# Patient Record
Sex: FEMALE | Race: WHITE | NOT HISPANIC OR LATINO | Employment: FULL TIME | URBAN - METROPOLITAN AREA
[De-identification: names, ages, dates, MRNs, and addresses within clinical notes are randomized per-mention and may not be internally consistent; named-entity substitution may affect disease eponyms.]

---

## 2020-02-05 ENCOUNTER — OFFICE VISIT (OUTPATIENT)
Dept: OBSTETRICS AND GYNECOLOGY | Facility: CLINIC | Age: 45
End: 2020-02-05
Attending: STUDENT IN AN ORGANIZED HEALTH CARE EDUCATION/TRAINING PROGRAM
Payer: COMMERCIAL

## 2020-02-05 ENCOUNTER — LAB VISIT (OUTPATIENT)
Dept: LAB | Facility: HOSPITAL | Age: 45
End: 2020-02-05
Attending: STUDENT IN AN ORGANIZED HEALTH CARE EDUCATION/TRAINING PROGRAM
Payer: COMMERCIAL

## 2020-02-05 VITALS
BODY MASS INDEX: 24.54 KG/M2 | SYSTOLIC BLOOD PRESSURE: 110 MMHG | WEIGHT: 152.69 LBS | HEIGHT: 66 IN | DIASTOLIC BLOOD PRESSURE: 70 MMHG

## 2020-02-05 DIAGNOSIS — Z12.31 VISIT FOR SCREENING MAMMOGRAM: ICD-10-CM

## 2020-02-05 DIAGNOSIS — Z01.419 WELL WOMAN EXAM: ICD-10-CM

## 2020-02-05 DIAGNOSIS — Z11.51 SCREENING FOR HPV (HUMAN PAPILLOMAVIRUS): Primary | ICD-10-CM

## 2020-02-05 DIAGNOSIS — Z12.4 SCREENING FOR CERVICAL CANCER: ICD-10-CM

## 2020-02-05 DIAGNOSIS — N92.6 IRREGULAR PERIODS/MENSTRUAL CYCLES: ICD-10-CM

## 2020-02-05 LAB
BASOPHILS # BLD AUTO: 0.04 K/UL (ref 0–0.2)
BASOPHILS NFR BLD: 0.6 % (ref 0–1.9)
DIFFERENTIAL METHOD: NORMAL
EOSINOPHIL # BLD AUTO: 0.3 K/UL (ref 0–0.5)
EOSINOPHIL NFR BLD: 3.6 % (ref 0–8)
ERYTHROCYTE [DISTWIDTH] IN BLOOD BY AUTOMATED COUNT: 12 % (ref 11.5–14.5)
HCT VFR BLD AUTO: 43.1 % (ref 37–48.5)
HGB BLD-MCNC: 14.2 G/DL (ref 12–16)
IMM GRANULOCYTES # BLD AUTO: 0.02 K/UL (ref 0–0.04)
IMM GRANULOCYTES NFR BLD AUTO: 0.3 % (ref 0–0.5)
LYMPHOCYTES # BLD AUTO: 2.5 K/UL (ref 1–4.8)
LYMPHOCYTES NFR BLD: 34.7 % (ref 18–48)
MCH RBC QN AUTO: 30 PG (ref 27–31)
MCHC RBC AUTO-ENTMCNC: 32.9 G/DL (ref 32–36)
MCV RBC AUTO: 91 FL (ref 82–98)
MONOCYTES # BLD AUTO: 0.8 K/UL (ref 0.3–1)
MONOCYTES NFR BLD: 10.5 % (ref 4–15)
NEUTROPHILS # BLD AUTO: 3.6 K/UL (ref 1.8–7.7)
NEUTROPHILS NFR BLD: 50.3 % (ref 38–73)
NRBC BLD-RTO: 0 /100 WBC
PLATELET # BLD AUTO: 295 K/UL (ref 150–350)
PMV BLD AUTO: 11.4 FL (ref 9.2–12.9)
RBC # BLD AUTO: 4.74 M/UL (ref 4–5.4)
TSH SERPL DL<=0.005 MIU/L-ACNC: 0.92 UIU/ML (ref 0.4–4)
WBC # BLD AUTO: 7.23 K/UL (ref 3.9–12.7)

## 2020-02-05 PROCEDURE — 99999 PR PBB SHADOW E&M-NEW PATIENT-LVL III: CPT | Mod: PBBFAC,,, | Performed by: STUDENT IN AN ORGANIZED HEALTH CARE EDUCATION/TRAINING PROGRAM

## 2020-02-05 PROCEDURE — 85025 COMPLETE CBC W/AUTO DIFF WBC: CPT

## 2020-02-05 PROCEDURE — 99386 PREV VISIT NEW AGE 40-64: CPT | Mod: S$GLB,,, | Performed by: STUDENT IN AN ORGANIZED HEALTH CARE EDUCATION/TRAINING PROGRAM

## 2020-02-05 PROCEDURE — 99999 PR PBB SHADOW E&M-NEW PATIENT-LVL III: ICD-10-PCS | Mod: PBBFAC,,, | Performed by: STUDENT IN AN ORGANIZED HEALTH CARE EDUCATION/TRAINING PROGRAM

## 2020-02-05 PROCEDURE — 99386 PR PREVENTIVE VISIT,NEW,40-64: ICD-10-PCS | Mod: S$GLB,,, | Performed by: STUDENT IN AN ORGANIZED HEALTH CARE EDUCATION/TRAINING PROGRAM

## 2020-02-05 PROCEDURE — 84443 ASSAY THYROID STIM HORMONE: CPT

## 2020-02-05 PROCEDURE — 87624 HPV HI-RISK TYP POOLED RSLT: CPT

## 2020-02-05 PROCEDURE — 88175 CYTOPATH C/V AUTO FLUID REDO: CPT

## 2020-02-05 RX ORDER — VORTIOXETINE 20 MG/1
1 TABLET, FILM COATED ORAL DAILY
COMMUNITY
Start: 2019-12-24 | End: 2020-05-12

## 2020-02-05 NOTE — PROGRESS NOTES
Chief Complaint: Well Woman Exam     HPI:      Mary Kate Lima is a 44 y.o.  who presents today for well woman exam.  LMP: Patient's last menstrual period was 2020 (exact date).  Does report some heavy cycles that occur every 3 weeks.  Previously had an U/S and possible biopsy (she is unsure what - maybe a colposcopy?).  She also was on OCPs for 5-6 months but did not notice improvement.  No other issues, problems, or complaints. Specifically, patient denies abnormal vaginal bleeding, discharge, pelvic pain, urinary problems, or changes in appetite. Ms. Lima is currently sexually active with a single male partner. She is currently using bilateral tubal ligation for contraception. She declines STD screening today.    Previous Pap: No result found  Previous Mammogram: 2019 Normal per patient  Most Recent Dexa: NA  Colonoscopy: NA    GYN Hx:  Menarche 14.  Reports cycles occur every 21 days with menses lasting 4 days.  Denies history of abnormal pap smears or STIs.  Gardasil:  No.   OB History        3    Para   3    Term   3            AB        Living   3       SAB        TAB        Ectopic        Multiple        Live Births   3           Obstetric Comments   Menarche age 14           Past Medical History:   Diagnosis Date    Abnormal Pap smear of cervix      Past Surgical History:   Procedure Laterality Date     SECTION      x3    ELBOW SURGERY Right     HEMORRHOID SURGERY      TUBAL LIGATION       Social History     Socioeconomic History    Marital status:      Spouse name: Not on file    Number of children: Not on file    Years of education: Not on file    Highest education level: Not on file   Occupational History    Not on file   Social Needs    Financial resource strain: Not on file    Food insecurity:     Worry: Not on file     Inability: Not on file    Transportation needs:     Medical: Not on file     Non-medical: Not on file   Tobacco Use    Smoking  "status: Never Smoker    Smokeless tobacco: Never Used   Substance and Sexual Activity    Alcohol use: Yes     Comment: socially    Drug use: Never    Sexual activity: Yes     Partners: Male     Birth control/protection: See Surgical Hx   Lifestyle    Physical activity:     Days per week: Not on file     Minutes per session: Not on file    Stress: Not on file   Relationships    Social connections:     Talks on phone: Not on file     Gets together: Not on file     Attends Oriental orthodox service: Not on file     Active member of club or organization: Not on file     Attends meetings of clubs or organizations: Not on file     Relationship status: Not on file   Other Topics Concern    Not on file   Social History Narrative    Not on file     Family History   Problem Relation Age of Onset    Breast cancer Paternal Grandmother     Breast cancer Paternal Aunt     Colon cancer Neg Hx     Ovarian cancer Neg Hx        Current Outpatient Medications:     TRINTELLIX 20 mg Tab, Take 1 tablet by mouth once daily., Disp: , Rfl:     ROS:     GENERAL: Denies unintentional weight gain or weight loss. Feeling well overall.   SKIN: Denies rash or lesions.   HEENT: Denies headaches, or vision changes.   CARDIOVASCULAR: Denies palpitations or chest pain.   RESPIRATORY: Denies shortness of breath or dyspnea on exertion.  BREASTS: Denies pain, lumps, or nipple discharge.   ABDOMEN: Denies abdominal pain, constipation, diarrhea, nausea, vomiting, change in appetite.  URINARY: Denies frequency, dysuria, hematuria.  NEUROLOGIC: Denies syncope or weakness.   PSYCHIATRIC: Denies depression, anxiety or mood swings.    Physical Exam:      PHYSICAL EXAM:  /70   Ht 5' 6" (1.676 m)   Wt 69.3 kg (152 lb 10.7 oz)   LMP 01/24/2020 (Exact Date) Comment: Tubal ligation  BMI 24.64 kg/m²   Body mass index is 24.64 kg/m².     APPEARANCE: Well nourished, well developed, in no acute distress.  PSYCH: Appropriate mood and affect.  SKIN: No " acne or hirsutism.  NECK: Neck symmetric without masses or thyromegaly.  NODES: No inguinal, axillary, or supraclavicular lymph node enlargement.  CHEST: Normal respiratory effort.    CARDIOVASCULAR:  Regular rate and rhythm.  LUNGS:  Clear to auscultation bilaterally.  BREASTS: Symmetrical, no skin changes or visible lesions.  No palpable masses or nipple discharge bilaterally.  ABDOMEN: Soft.  No tenderness or masses.    PELVIC: Normal external genitalia without lesions.  Normal hair distribution.  Adequate perineal body, normal urethral meatus.  Vagina moist and well rugated without lesions or discharge.  Cervix pink, without lesions, discharge or tenderness.  No significant cystocele or rectocele.  Bimanual exam shows uterus to be normal size, regular, mobile and nontender.  Adnexa without masses or tenderness.    EXTREMITIES: No edema.  No tenderness to palpation.    Labs:  upt neg    Assessment/Plan:     44 y.o.     Screening for HPV (human papillomavirus)  -     HPV High Risk Genotypes, PCR    Screening for cervical cancer  -     Liquid-Based Pap Smear, Screening    Visit for screening mammogram  -     Mammo Digital Screening Bilat w/ Marcellus; Future; Expected date: 2020    Irregular periods/menstrual cycles  -     POCT urine pregnancy  -     CBC auto differential; Future; Expected date: 2020  -     TSH; Future; Expected date: 2020  -     US Pelvis Comp with Transvag NON-OB (xpd; Future; Expected date: 2020    Well woman exam          Counselin.  Annual exam performed today without difficulty.  Patient was counseled today on current ASCCP pap guidelines, the recommendation for yearly pelvic exams, healthy diet and exercise routines, annual mammograms.  Pap smear ordered.  All questions answered.    2.  Contraception:  BTL.  3.  STD testing:  Declines.  4.  Follow up with PCP for other health maintenance.  5.  RTC in 2 weeks for AUB evaluation or sooner if needed.    Use of  the [employer] Patient Portal discussed and encouraged during today's visit.

## 2020-02-06 ENCOUNTER — PATIENT MESSAGE (OUTPATIENT)
Dept: OBSTETRICS AND GYNECOLOGY | Facility: CLINIC | Age: 45
End: 2020-02-06

## 2020-02-06 NOTE — TELEPHONE ENCOUNTER
Per patient.      So, If I'm reading them right, my test results are all normal? Including thyroid? Thanks

## 2020-02-12 LAB
HPV HR 12 DNA SPEC QL NAA+PROBE: NEGATIVE
HPV16 AG SPEC QL: NEGATIVE
HPV18 DNA SPEC QL NAA+PROBE: NEGATIVE

## 2020-03-02 ENCOUNTER — OFFICE VISIT (OUTPATIENT)
Dept: OBSTETRICS AND GYNECOLOGY | Facility: CLINIC | Age: 45
End: 2020-03-02
Attending: STUDENT IN AN ORGANIZED HEALTH CARE EDUCATION/TRAINING PROGRAM
Payer: COMMERCIAL

## 2020-03-02 ENCOUNTER — TELEPHONE (OUTPATIENT)
Dept: OBSTETRICS AND GYNECOLOGY | Facility: CLINIC | Age: 45
End: 2020-03-02

## 2020-03-02 ENCOUNTER — APPOINTMENT (OUTPATIENT)
Dept: RADIOLOGY | Facility: OTHER | Age: 45
End: 2020-03-02
Attending: STUDENT IN AN ORGANIZED HEALTH CARE EDUCATION/TRAINING PROGRAM
Payer: COMMERCIAL

## 2020-03-02 ENCOUNTER — PATIENT MESSAGE (OUTPATIENT)
Dept: OBSTETRICS AND GYNECOLOGY | Facility: CLINIC | Age: 45
End: 2020-03-02

## 2020-03-02 VITALS
SYSTOLIC BLOOD PRESSURE: 112 MMHG | HEIGHT: 66 IN | WEIGHT: 160.25 LBS | WEIGHT: 152.75 LBS | BODY MASS INDEX: 25.75 KG/M2 | DIASTOLIC BLOOD PRESSURE: 82 MMHG | BODY MASS INDEX: 24.55 KG/M2 | HEIGHT: 66 IN

## 2020-03-02 DIAGNOSIS — Z12.31 VISIT FOR SCREENING MAMMOGRAM: ICD-10-CM

## 2020-03-02 DIAGNOSIS — N93.9 ABNORMAL UTERINE BLEEDING (AUB): Primary | ICD-10-CM

## 2020-03-02 DIAGNOSIS — R92.8 ABNORMAL MAMMOGRAM: Primary | ICD-10-CM

## 2020-03-02 LAB
FINAL PATHOLOGIC DIAGNOSIS: NORMAL
Lab: NORMAL

## 2020-03-02 PROCEDURE — 77067 SCR MAMMO BI INCL CAD: CPT | Mod: 26,,, | Performed by: RADIOLOGY

## 2020-03-02 PROCEDURE — 77067 MAMMO DIGITAL SCREENING BILAT WITH TOMOSYNTHESIS_CAD: ICD-10-PCS | Mod: 26,,, | Performed by: RADIOLOGY

## 2020-03-02 PROCEDURE — 99213 OFFICE O/P EST LOW 20 MIN: CPT | Mod: S$GLB,,, | Performed by: STUDENT IN AN ORGANIZED HEALTH CARE EDUCATION/TRAINING PROGRAM

## 2020-03-02 PROCEDURE — 3008F BODY MASS INDEX DOCD: CPT | Mod: CPTII,S$GLB,, | Performed by: STUDENT IN AN ORGANIZED HEALTH CARE EDUCATION/TRAINING PROGRAM

## 2020-03-02 PROCEDURE — 99999 PR PBB SHADOW E&M-EST. PATIENT-LVL III: CPT | Mod: PBBFAC,,, | Performed by: STUDENT IN AN ORGANIZED HEALTH CARE EDUCATION/TRAINING PROGRAM

## 2020-03-02 PROCEDURE — 3008F PR BODY MASS INDEX (BMI) DOCUMENTED: ICD-10-PCS | Mod: CPTII,S$GLB,, | Performed by: STUDENT IN AN ORGANIZED HEALTH CARE EDUCATION/TRAINING PROGRAM

## 2020-03-02 PROCEDURE — 99999 PR PBB SHADOW E&M-EST. PATIENT-LVL III: ICD-10-PCS | Mod: PBBFAC,,, | Performed by: STUDENT IN AN ORGANIZED HEALTH CARE EDUCATION/TRAINING PROGRAM

## 2020-03-02 PROCEDURE — 77063 BREAST TOMOSYNTHESIS BI: CPT | Mod: 26,,, | Performed by: RADIOLOGY

## 2020-03-02 PROCEDURE — 77063 MAMMO DIGITAL SCREENING BILAT WITH TOMOSYNTHESIS_CAD: ICD-10-PCS | Mod: 26,,, | Performed by: RADIOLOGY

## 2020-03-02 PROCEDURE — 99213 PR OFFICE/OUTPT VISIT, EST, LEVL III, 20-29 MIN: ICD-10-PCS | Mod: S$GLB,,, | Performed by: STUDENT IN AN ORGANIZED HEALTH CARE EDUCATION/TRAINING PROGRAM

## 2020-03-02 PROCEDURE — 77067 SCR MAMMO BI INCL CAD: CPT | Mod: TC,PN

## 2020-03-02 NOTE — PROGRESS NOTES
Chief Complaint: U/S Results     HPI:      Mary Kate Lima is a 44 y.o.  who presents today for follow up of U/S results.  Has irregular and heavy cycles.  LMP: Patient's last menstrual period was 2020 (approximate).  No other issues, problems, or complaints.     OB History        3    Para   3    Term   3            AB        Living   3       SAB        TAB        Ectopic        Multiple        Live Births   3           Obstetric Comments   Menarche age 14           Past Medical History:   Diagnosis Date    Abnormal Pap smear of cervix      Past Surgical History:   Procedure Laterality Date     SECTION      x3    ELBOW SURGERY Right     HEMORRHOID SURGERY      TUBAL LIGATION       Social History     Socioeconomic History    Marital status:      Spouse name: Not on file    Number of children: Not on file    Years of education: Not on file    Highest education level: Not on file   Occupational History    Not on file   Social Needs    Financial resource strain: Not on file    Food insecurity:     Worry: Not on file     Inability: Not on file    Transportation needs:     Medical: Not on file     Non-medical: Not on file   Tobacco Use    Smoking status: Never Smoker    Smokeless tobacco: Never Used   Substance and Sexual Activity    Alcohol use: Yes     Comment: socially    Drug use: Never    Sexual activity: Yes     Partners: Male     Birth control/protection: See Surgical Hx   Lifestyle    Physical activity:     Days per week: Not on file     Minutes per session: Not on file    Stress: Not on file   Relationships    Social connections:     Talks on phone: Not on file     Gets together: Not on file     Attends Church service: Not on file     Active member of club or organization: Not on file     Attends meetings of clubs or organizations: Not on file     Relationship status: Not on file   Other Topics Concern    Not on file   Social History Narrative  "   Not on file     Family History   Problem Relation Age of Onset    Breast cancer Paternal Grandmother     Breast cancer Paternal Aunt     Colon cancer Neg Hx     Ovarian cancer Neg Hx        Current Outpatient Medications:     TRINTELLIX 20 mg Tab, Take 1 tablet by mouth once daily., Disp: , Rfl:     ROS:     GENERAL: Denies unintentional weight gain or weight loss. Feeling well overall.   SKIN: Denies rash or lesions.   HEENT: Denies headaches, or vision changes.   ABDOMEN: Denies abdominal pain, constipation, diarrhea, nausea, vomiting, change in appetite.  URINARY: Denies frequency, dysuria, hematuria.  NEUROLOGIC: Denies syncope or weakness.   PSYCHIATRIC: Denies depression, anxiety or mood swings.    Physical Exam:      PHYSICAL EXAM:  /82   Ht 5' 6" (1.676 m)   Wt 72.7 kg (160 lb 4.4 oz)   LMP 2020 (Approximate)   BMI 25.87 kg/m²   Body mass index is 25.87 kg/m².     APPEARANCE: Well nourished, well developed, in no acute distress.  PSYCH: Appropriate mood and affect.  SKIN: No acne or hirsutism.      Assessment/Plan:     44 y.o.     Abnormal uterine bleeding (AUB)          Counselin.  AUB:  U/S results reviewed with patient.  All questions answered.  Discussed EMB which patient would like to come in for in a few weeks.  Also reviewed treatment options.  She will think about her options.  2.  Follow up with PCP for other health maintenance.  3.  RTC in 2 weeks for EMB or sooner if needed.    Use of the [employer] Patient Portal discussed and encouraged during today's visit.             " no

## 2020-03-02 NOTE — TELEPHONE ENCOUNTER
Spoke with patient.  Pap smear normal.  Mammogram needs additional views.  Will set up.  All questions answered.

## 2020-03-03 NOTE — TELEPHONE ENCOUNTER
Called patient and gave number to the [facility] so that she can schedule her appointment, Patient will call to schedule.

## 2020-03-04 ENCOUNTER — TELEPHONE (OUTPATIENT)
Dept: RADIOLOGY | Facility: OTHER | Age: 45
End: 2020-03-04

## 2020-03-04 NOTE — TELEPHONE ENCOUNTER
Patient notified, per mammo recommendation, prior breast imaging has been uploaded into her chart and will be compared by Radiologist. She will be notified if it necessary to return for diagnostic mammogram.

## 2020-03-05 ENCOUNTER — TELEPHONE (OUTPATIENT)
Dept: RADIOLOGY | Facility: OTHER | Age: 45
End: 2020-03-05

## 2020-03-05 NOTE — TELEPHONE ENCOUNTER
Notified patient her prior mammo images have been compared and the calcification findings are stable and benign. Instructed on routine screening mammogram in one year. Diagnostic mammo appointment cancelled.

## 2020-05-12 RX ORDER — VORTIOXETINE 20 MG/1
TABLET, FILM COATED ORAL
Qty: 30 TABLET | Refills: 11 | Status: SHIPPED | OUTPATIENT
Start: 2020-05-12 | End: 2020-07-20 | Stop reason: SDUPTHER

## 2020-07-20 ENCOUNTER — OFFICE VISIT (OUTPATIENT)
Dept: FAMILY MEDICINE | Facility: CLINIC | Age: 45
End: 2020-07-20
Payer: COMMERCIAL

## 2020-07-20 VITALS
OXYGEN SATURATION: 98 % | DIASTOLIC BLOOD PRESSURE: 82 MMHG | SYSTOLIC BLOOD PRESSURE: 122 MMHG | WEIGHT: 162.94 LBS | BODY MASS INDEX: 26.19 KG/M2 | HEART RATE: 69 BPM | RESPIRATION RATE: 16 BRPM | HEIGHT: 66 IN | TEMPERATURE: 98 F

## 2020-07-20 DIAGNOSIS — F33.0 DEPRESSION, MAJOR, RECURRENT, MILD: ICD-10-CM

## 2020-07-20 DIAGNOSIS — Z00.00 ANNUAL PHYSICAL EXAM: Primary | ICD-10-CM

## 2020-07-20 PROCEDURE — 99999 PR PBB SHADOW E&M-EST. PATIENT-LVL III: CPT | Mod: PBBFAC,,, | Performed by: INTERNAL MEDICINE

## 2020-07-20 PROCEDURE — 99396 PR PREVENTIVE VISIT,EST,40-64: ICD-10-PCS | Mod: S$GLB,,, | Performed by: INTERNAL MEDICINE

## 2020-07-20 PROCEDURE — 99999 PR PBB SHADOW E&M-EST. PATIENT-LVL III: ICD-10-PCS | Mod: PBBFAC,,, | Performed by: INTERNAL MEDICINE

## 2020-07-20 PROCEDURE — 3008F BODY MASS INDEX DOCD: CPT | Mod: CPTII,S$GLB,, | Performed by: INTERNAL MEDICINE

## 2020-07-20 PROCEDURE — 99396 PREV VISIT EST AGE 40-64: CPT | Mod: S$GLB,,, | Performed by: INTERNAL MEDICINE

## 2020-07-20 PROCEDURE — 3008F PR BODY MASS INDEX (BMI) DOCUMENTED: ICD-10-PCS | Mod: CPTII,S$GLB,, | Performed by: INTERNAL MEDICINE

## 2020-07-20 RX ORDER — VORTIOXETINE 20 MG/1
1 TABLET, FILM COATED ORAL DAILY
Qty: 30 TABLET | Refills: 11 | Status: SHIPPED | OUTPATIENT
Start: 2020-07-20 | End: 2021-08-16

## 2020-07-20 NOTE — PROGRESS NOTES
[facility] Note    Chief Complaint      Chief Complaint   Patient presents with    Establish Care     from [facility]    Medication Refill       History of Present Illness      Mary Kate Lima is a 44 y.o. female who presents today for   Chief Complaint   Patient presents with    Establish Care     from [facility]    Medication Refill   .  Patient comes to appointment here for annual establish visit . Here for annual preventative visit . She needs full screening labs . She is a regular exerciser and is eating healthy diet .     Problem List Items Addressed This Visit        Other    Annual physical exam - Primary    Overview     pe documented needs full screening labs   Cont current regimen   Will make recs when all reviewed            Other Visit Diagnoses     Depression, major, recurrent, mild                Past Medical History:  Past Medical History:   Diagnosis Date    Abnormal Pap smear of cervix        Past Surgical History:  Past Surgical History:   Procedure Laterality Date     SECTION      x3    ELBOW SURGERY Right     HEMORRHOID SURGERY      TUBAL LIGATION         Family History:  family history includes Breast cancer in her paternal aunt and paternal grandmother.    Social History:  Social History     Socioeconomic History    Marital status:      Spouse name: Not on file    Number of children: Not on file    Years of education: Not on file    Highest education level: Not on file   Occupational History    Not on file   Social Needs    Financial resource strain: Not on file    Food insecurity     Worry: Not on file     Inability: Not on file    Transportation needs     Medical: Not on file     Non-medical: Not on file   Tobacco Use    Smoking status: Never Smoker    Smokeless tobacco: Never Used   Substance and Sexual Activity    Alcohol use: Yes     Comment: socially    Drug use: Never    Sexual activity: Yes     Partners: Male      Birth control/protection: See Surgical Hx   Lifestyle    Physical activity     Days per week: Not on file     Minutes per session: Not on file    Stress: Not on file   Relationships    Social connections     Talks on phone: Not on file     Gets together: Not on file     Attends Sikh service: Not on file     Active member of club or organization: Not on file     Attends meetings of clubs or organizations: Not on file     Relationship status: Not on file   Other Topics Concern    Not on file   Social History Narrative    Not on file       Review of Systems:   Review of Systems   Constitutional: Negative for fever and weight loss.   HENT: Negative for congestion, hearing loss and sore throat.    Eyes: Negative for blurred vision.   Respiratory: Negative for cough and shortness of breath.    Cardiovascular: Negative for chest pain, palpitations, claudication and leg swelling.   Gastrointestinal: Negative for abdominal pain, constipation, diarrhea and heartburn.   Genitourinary: Negative for dysuria.   Musculoskeletal: Negative for back pain and myalgias.   Skin: Negative for rash.   Neurological: Negative for focal weakness and headaches.   Psychiatric/Behavioral: Negative for depression, memory loss and suicidal ideas. The patient is not nervous/anxious.          Medications:  Outpatient Encounter Medications as of 7/20/2020   Medication Sig Dispense Refill    vortioxetine (TRINTELLIX) 20 mg Tab Take 1 tablet (20 mg total) by mouth once daily. 30 tablet 11    [DISCONTINUED] TRINTELLIX 20 mg Tab TAKE ONE TABLET BY MOUTH EVERY DAY 30 tablet 11     No facility-administered encounter medications on file as of 7/20/2020.         Allergies:  Review of patient's allergies indicates:  No Known Allergies      Physical Exam      Vitals:    07/20/20 1321   BP: 122/82   Pulse: 69   Resp: 16   Temp: 97.7 °F (36.5 °C)      Body mass index is 26.3 kg/m².    Physical Exam  Constitutional:       Appearance: She is  well-developed.   Eyes:      Pupils: Pupils are equal, round, and reactive to light.   Neck:      Musculoskeletal: Normal range of motion.      Thyroid: No thyromegaly.   Cardiovascular:      Rate and Rhythm: Normal rate.      Heart sounds: Normal heart sounds. No murmur. No friction rub. No gallop.    Pulmonary:      Breath sounds: Normal breath sounds.   Abdominal:      General: Bowel sounds are normal.      Palpations: Abdomen is soft. There is no hepatomegaly or splenomegaly.      Tenderness: There is no abdominal tenderness.   Musculoskeletal: Normal range of motion.   Lymphadenopathy:      Cervical: No cervical adenopathy.   Skin:     General: Skin is warm.      Findings: No rash.   Neurological:      Mental Status: She is alert and oriented to person, place, and time.      Cranial Nerves: No cranial nerve deficit.   Psychiatric:         Behavior: Behavior normal.          Laboratory:  CBC:  No results for input(s): WBC, RBC, HGB, HCT, PLT, MCV, MCH, MCHC in the last 2160 hours.  CMP:  No results for input(s): GLU, CALCIUM, ALBUMIN, PROT, NA, K, CO2, CL, BUN, ALKPHOS, ALT, AST, BILITOT in the last 2160 hours.    Invalid input(s): CREATININ  URINALYSIS:  No results for input(s): COLORU, CLARITYU, SPECGRAV, PHUR, PROTEINUA, GLUCOSEU, BILIRUBINCON, BLOODU, WBCU, RBCU, BACTERIA, MUCUS, NITRITE, LEUKOCYTESUR, UROBILINOGEN, HYALINECASTS in the last 2160 hours.   LIPIDS:  No results for input(s): TSH, HDL, CHOL, TRIG, LDLCALC, CHOLHDL, NONHDLCHOL, TOTALCHOLEST in the last 2160 hours.  TSH:  No results for input(s): TSH in the last 2160 hours.  A1C:  No results for input(s): HGBA1C in the last 2160 hours.    Radiology:        Assessment:     Mary Kate Lima is a 44 y.o.female with:    Annual physical exam  -     CBC auto differential; Future; Expected date: 07/20/2020  -     Comprehensive metabolic panel; Future; Expected date: 07/20/2020  -     Lipid Panel; Future; Expected date: 07/20/2020  -     TSH; Future;  Expected date: 07/20/2020    Depression, major, recurrent, mild  -     vortioxetine (TRINTELLIX) 20 mg Tab; Take 1 tablet (20 mg total) by mouth once daily.  Dispense: 30 tablet; Refill: 11          Plan:     Problem List Items Addressed This Visit        Other    Annual physical exam - Primary    Overview     pe documented needs full screening labs   Cont current regimen   Will make recs when all reviewed            Other Visit Diagnoses     Depression, major, recurrent, mild              As above, continue current medications and maintain follow up with specialists.  Return to clinic in 12  months.      Frederick W Dantagnan [facility] [place]

## 2020-10-19 PROBLEM — Z00.00 ANNUAL PHYSICAL EXAM: Status: RESOLVED | Noted: 2020-07-20 | Resolved: 2020-10-19

## 2020-11-10 ENCOUNTER — PATIENT MESSAGE (OUTPATIENT)
Dept: FAMILY MEDICINE | Facility: CLINIC | Age: 45
End: 2020-11-10

## 2020-11-10 DIAGNOSIS — F41.9 ANXIETY: ICD-10-CM

## 2020-11-10 DIAGNOSIS — F41.9 ANXIETY: Primary | ICD-10-CM

## 2020-11-10 RX ORDER — BUSPIRONE HYDROCHLORIDE 5 MG/1
5 TABLET ORAL 2 TIMES DAILY
Qty: 60 TABLET | Refills: 5 | Status: SHIPPED | OUTPATIENT
Start: 2020-11-10 | End: 2020-11-11 | Stop reason: SDUPTHER

## 2020-11-11 ENCOUNTER — PATIENT MESSAGE (OUTPATIENT)
Dept: FAMILY MEDICINE | Facility: CLINIC | Age: 45
End: 2020-11-11

## 2020-11-11 RX ORDER — BUSPIRONE HYDROCHLORIDE 5 MG/1
5 TABLET ORAL 2 TIMES DAILY
Qty: 60 TABLET | Refills: 5 | Status: SHIPPED | OUTPATIENT
Start: 2020-11-11 | End: 2022-07-20 | Stop reason: SDUPTHER

## 2021-03-18 ENCOUNTER — PATIENT MESSAGE (OUTPATIENT)
Dept: OBSTETRICS AND GYNECOLOGY | Facility: CLINIC | Age: 46
End: 2021-03-18

## 2021-04-05 ENCOUNTER — PATIENT MESSAGE (OUTPATIENT)
Dept: ADMINISTRATIVE | Facility: HOSPITAL | Age: 46
End: 2021-04-05

## 2021-04-13 ENCOUNTER — PATIENT MESSAGE (OUTPATIENT)
Dept: RESEARCH | Facility: HOSPITAL | Age: 46
End: 2021-04-13

## 2021-04-15 ENCOUNTER — PATIENT MESSAGE (OUTPATIENT)
Dept: FAMILY MEDICINE | Facility: CLINIC | Age: 46
End: 2021-04-15

## 2021-04-16 RX ORDER — ALPRAZOLAM 0.5 MG/1
TABLET ORAL
Qty: 5 TABLET | Refills: 0 | Status: SHIPPED | OUTPATIENT
Start: 2021-04-16 | End: 2021-04-19

## 2021-04-19 ENCOUNTER — APPOINTMENT (OUTPATIENT)
Dept: RADIOLOGY | Facility: OTHER | Age: 46
End: 2021-04-19
Attending: STUDENT IN AN ORGANIZED HEALTH CARE EDUCATION/TRAINING PROGRAM
Payer: COMMERCIAL

## 2021-04-19 ENCOUNTER — OFFICE VISIT (OUTPATIENT)
Dept: OBSTETRICS AND GYNECOLOGY | Facility: CLINIC | Age: 46
End: 2021-04-19
Attending: STUDENT IN AN ORGANIZED HEALTH CARE EDUCATION/TRAINING PROGRAM
Payer: COMMERCIAL

## 2021-04-19 VITALS
HEIGHT: 66 IN | WEIGHT: 157.31 LBS | SYSTOLIC BLOOD PRESSURE: 122 MMHG | DIASTOLIC BLOOD PRESSURE: 82 MMHG | BODY MASS INDEX: 25.28 KG/M2

## 2021-04-19 DIAGNOSIS — Z01.419 WELL WOMAN EXAM: ICD-10-CM

## 2021-04-19 DIAGNOSIS — Z12.31 VISIT FOR SCREENING MAMMOGRAM: Primary | ICD-10-CM

## 2021-04-19 DIAGNOSIS — Z12.31 VISIT FOR SCREENING MAMMOGRAM: ICD-10-CM

## 2021-04-19 DIAGNOSIS — N92.0 MENORRHAGIA WITH REGULAR CYCLE: ICD-10-CM

## 2021-04-19 DIAGNOSIS — Z11.51 SCREENING FOR HPV (HUMAN PAPILLOMAVIRUS): ICD-10-CM

## 2021-04-19 DIAGNOSIS — Z12.4 SCREENING FOR CERVICAL CANCER: ICD-10-CM

## 2021-04-19 PROCEDURE — 99396 PREV VISIT EST AGE 40-64: CPT | Mod: S$GLB,,, | Performed by: STUDENT IN AN ORGANIZED HEALTH CARE EDUCATION/TRAINING PROGRAM

## 2021-04-19 PROCEDURE — 1126F PR PAIN SEVERITY QUANTIFIED, NO PAIN PRESENT: ICD-10-PCS | Mod: S$GLB,,, | Performed by: STUDENT IN AN ORGANIZED HEALTH CARE EDUCATION/TRAINING PROGRAM

## 2021-04-19 PROCEDURE — 99999 PR PBB SHADOW E&M-EST. PATIENT-LVL III: CPT | Mod: PBBFAC,,, | Performed by: STUDENT IN AN ORGANIZED HEALTH CARE EDUCATION/TRAINING PROGRAM

## 2021-04-19 PROCEDURE — 77067 SCR MAMMO BI INCL CAD: CPT | Mod: TC,PN

## 2021-04-19 PROCEDURE — 77063 MAMMO DIGITAL SCREENING BILAT WITH TOMO: ICD-10-PCS | Mod: 26,,, | Performed by: RADIOLOGY

## 2021-04-19 PROCEDURE — 77063 BREAST TOMOSYNTHESIS BI: CPT | Mod: 26,,, | Performed by: RADIOLOGY

## 2021-04-19 PROCEDURE — 77067 SCR MAMMO BI INCL CAD: CPT | Mod: 26,,, | Performed by: RADIOLOGY

## 2021-04-19 PROCEDURE — 99396 PR PREVENTIVE VISIT,EST,40-64: ICD-10-PCS | Mod: S$GLB,,, | Performed by: STUDENT IN AN ORGANIZED HEALTH CARE EDUCATION/TRAINING PROGRAM

## 2021-04-19 PROCEDURE — 87624 HPV HI-RISK TYP POOLED RSLT: CPT | Performed by: STUDENT IN AN ORGANIZED HEALTH CARE EDUCATION/TRAINING PROGRAM

## 2021-04-19 PROCEDURE — 88175 CYTOPATH C/V AUTO FLUID REDO: CPT | Performed by: STUDENT IN AN ORGANIZED HEALTH CARE EDUCATION/TRAINING PROGRAM

## 2021-04-19 PROCEDURE — 3008F PR BODY MASS INDEX (BMI) DOCUMENTED: ICD-10-PCS | Mod: CPTII,S$GLB,, | Performed by: STUDENT IN AN ORGANIZED HEALTH CARE EDUCATION/TRAINING PROGRAM

## 2021-04-19 PROCEDURE — 1126F AMNT PAIN NOTED NONE PRSNT: CPT | Mod: S$GLB,,, | Performed by: STUDENT IN AN ORGANIZED HEALTH CARE EDUCATION/TRAINING PROGRAM

## 2021-04-19 PROCEDURE — 77067 MAMMO DIGITAL SCREENING BILAT WITH TOMO: ICD-10-PCS | Mod: 26,,, | Performed by: RADIOLOGY

## 2021-04-19 PROCEDURE — 3008F BODY MASS INDEX DOCD: CPT | Mod: CPTII,S$GLB,, | Performed by: STUDENT IN AN ORGANIZED HEALTH CARE EDUCATION/TRAINING PROGRAM

## 2021-04-19 PROCEDURE — 99999 PR PBB SHADOW E&M-EST. PATIENT-LVL III: ICD-10-PCS | Mod: PBBFAC,,, | Performed by: STUDENT IN AN ORGANIZED HEALTH CARE EDUCATION/TRAINING PROGRAM

## 2021-04-20 ENCOUNTER — TELEPHONE (OUTPATIENT)
Dept: OBSTETRICS AND GYNECOLOGY | Facility: CLINIC | Age: 46
End: 2021-04-20

## 2021-04-24 LAB
FINAL PATHOLOGIC DIAGNOSIS: NORMAL
Lab: NORMAL

## 2021-07-09 ENCOUNTER — PATIENT MESSAGE (OUTPATIENT)
Dept: FAMILY MEDICINE | Facility: CLINIC | Age: 46
End: 2021-07-09

## 2021-07-09 DIAGNOSIS — K52.9 GASTROENTERITIS: Primary | ICD-10-CM

## 2021-07-09 RX ORDER — ONDANSETRON 4 MG/1
4 TABLET, FILM COATED ORAL EVERY 8 HOURS PRN
Qty: 30 TABLET | Refills: 0 | Status: SHIPPED | OUTPATIENT
Start: 2021-07-09 | End: 2022-02-24 | Stop reason: SDUPTHER

## 2021-10-05 ENCOUNTER — PATIENT MESSAGE (OUTPATIENT)
Dept: ADMINISTRATIVE | Facility: HOSPITAL | Age: 46
End: 2021-10-05

## 2022-01-10 ENCOUNTER — PATIENT MESSAGE (OUTPATIENT)
Dept: ADMINISTRATIVE | Facility: HOSPITAL | Age: 47
End: 2022-01-10
Payer: COMMERCIAL

## 2022-02-24 DIAGNOSIS — K52.9 GASTROENTERITIS: ICD-10-CM

## 2022-02-24 RX ORDER — ONDANSETRON 4 MG/1
4 TABLET, FILM COATED ORAL EVERY 8 HOURS PRN
Qty: 30 TABLET | Refills: 0 | Status: SHIPPED | OUTPATIENT
Start: 2022-02-24 | End: 2023-01-31 | Stop reason: SDUPTHER

## 2022-02-24 RX ORDER — BUPROPION HYDROCHLORIDE 150 MG/1
150 TABLET ORAL DAILY
Qty: 30 TABLET | Refills: 0 | Status: SHIPPED | OUTPATIENT
Start: 2022-02-24 | End: 2022-03-22

## 2022-04-27 DIAGNOSIS — Z12.31 OTHER SCREENING MAMMOGRAM: ICD-10-CM

## 2022-05-03 ENCOUNTER — APPOINTMENT (OUTPATIENT)
Dept: RADIOLOGY | Facility: OTHER | Age: 47
End: 2022-05-03
Attending: INTERNAL MEDICINE
Payer: COMMERCIAL

## 2022-05-03 VITALS — HEIGHT: 66 IN | BODY MASS INDEX: 25.26 KG/M2 | WEIGHT: 157.19 LBS

## 2022-05-03 DIAGNOSIS — Z12.31 OTHER SCREENING MAMMOGRAM: ICD-10-CM

## 2022-05-03 PROCEDURE — 77067 SCR MAMMO BI INCL CAD: CPT | Mod: TC,PN

## 2022-05-03 PROCEDURE — 77063 MAMMO DIGITAL SCREENING BILAT WITH TOMO: ICD-10-PCS | Mod: 26,,, | Performed by: RADIOLOGY

## 2022-05-03 PROCEDURE — 77063 BREAST TOMOSYNTHESIS BI: CPT | Mod: TC,PN

## 2022-05-03 PROCEDURE — 77067 MAMMO DIGITAL SCREENING BILAT WITH TOMO: ICD-10-PCS | Mod: 26,,, | Performed by: RADIOLOGY

## 2022-05-03 PROCEDURE — 77063 BREAST TOMOSYNTHESIS BI: CPT | Mod: 26,,, | Performed by: RADIOLOGY

## 2022-05-03 PROCEDURE — 77067 SCR MAMMO BI INCL CAD: CPT | Mod: 26,,, | Performed by: RADIOLOGY

## 2022-05-04 ENCOUNTER — PATIENT MESSAGE (OUTPATIENT)
Dept: INTERNAL MEDICINE | Facility: CLINIC | Age: 47
End: 2022-05-04
Payer: COMMERCIAL

## 2022-07-11 ENCOUNTER — OFFICE VISIT (OUTPATIENT)
Dept: INTERNAL MEDICINE | Facility: CLINIC | Age: 47
End: 2022-07-11
Payer: COMMERCIAL

## 2022-07-11 VITALS
HEART RATE: 73 BPM | SYSTOLIC BLOOD PRESSURE: 118 MMHG | WEIGHT: 140.19 LBS | RESPIRATION RATE: 18 BRPM | BODY MASS INDEX: 22.53 KG/M2 | OXYGEN SATURATION: 98 % | DIASTOLIC BLOOD PRESSURE: 78 MMHG | TEMPERATURE: 98 F | HEIGHT: 66 IN

## 2022-07-11 DIAGNOSIS — F33.0 DEPRESSION, MAJOR, RECURRENT, MILD: ICD-10-CM

## 2022-07-11 DIAGNOSIS — Z00.00 ANNUAL PHYSICAL EXAM: Primary | ICD-10-CM

## 2022-07-11 PROCEDURE — 3008F PR BODY MASS INDEX (BMI) DOCUMENTED: ICD-10-PCS | Mod: CPTII,S$GLB,, | Performed by: INTERNAL MEDICINE

## 2022-07-11 PROCEDURE — 99396 PREV VISIT EST AGE 40-64: CPT | Mod: S$GLB,,, | Performed by: INTERNAL MEDICINE

## 2022-07-11 PROCEDURE — 1159F MED LIST DOCD IN RCRD: CPT | Mod: CPTII,S$GLB,, | Performed by: INTERNAL MEDICINE

## 2022-07-11 PROCEDURE — 99999 PR PBB SHADOW E&M-EST. PATIENT-LVL III: ICD-10-PCS | Mod: PBBFAC,,, | Performed by: INTERNAL MEDICINE

## 2022-07-11 PROCEDURE — 3074F SYST BP LT 130 MM HG: CPT | Mod: CPTII,S$GLB,, | Performed by: INTERNAL MEDICINE

## 2022-07-11 PROCEDURE — 1160F PR REVIEW ALL MEDS BY PRESCRIBER/CLIN PHARMACIST DOCUMENTED: ICD-10-PCS | Mod: CPTII,S$GLB,, | Performed by: INTERNAL MEDICINE

## 2022-07-11 PROCEDURE — 1160F RVW MEDS BY RX/DR IN RCRD: CPT | Mod: CPTII,S$GLB,, | Performed by: INTERNAL MEDICINE

## 2022-07-11 PROCEDURE — 3078F DIAST BP <80 MM HG: CPT | Mod: CPTII,S$GLB,, | Performed by: INTERNAL MEDICINE

## 2022-07-11 PROCEDURE — 99396 PR PREVENTIVE VISIT,EST,40-64: ICD-10-PCS | Mod: S$GLB,,, | Performed by: INTERNAL MEDICINE

## 2022-07-11 PROCEDURE — 3074F PR MOST RECENT SYSTOLIC BLOOD PRESSURE < 130 MM HG: ICD-10-PCS | Mod: CPTII,S$GLB,, | Performed by: INTERNAL MEDICINE

## 2022-07-11 PROCEDURE — 3008F BODY MASS INDEX DOCD: CPT | Mod: CPTII,S$GLB,, | Performed by: INTERNAL MEDICINE

## 2022-07-11 PROCEDURE — 99999 PR PBB SHADOW E&M-EST. PATIENT-LVL III: CPT | Mod: PBBFAC,,, | Performed by: INTERNAL MEDICINE

## 2022-07-11 PROCEDURE — 3078F PR MOST RECENT DIASTOLIC BLOOD PRESSURE < 80 MM HG: ICD-10-PCS | Mod: CPTII,S$GLB,, | Performed by: INTERNAL MEDICINE

## 2022-07-11 PROCEDURE — 1159F PR MEDICATION LIST DOCUMENTED IN MEDICAL RECORD: ICD-10-PCS | Mod: CPTII,S$GLB,, | Performed by: INTERNAL MEDICINE

## 2022-07-11 NOTE — PROGRESS NOTES
[facility] Note    Chief Complaint      Chief Complaint   Patient presents with    Annual Exam       History of Present Illness      Mary Kate Lima is a 46 y.o. female who presents today for   Chief Complaint   Patient presents with    Annual Exam   .  Patient comes to appointment here for annual preventative visit with me . She is feeling great . She is seeing dr mukund hunter for ibs issues . She is feeling great with current regimen . She has  had colonoscopy with [facility] .    Problem List Items Addressed This Visit        Psychiatric    Depression, major, recurrent, mild    Overview     Stable on current regimen is doing well               Other    Annual physical exam - Primary    Overview     pe documented needs full screening labs   Cont current regimen   Will make recs when all reviewed                    Past Medical History:  Past Medical History:   Diagnosis Date    Abnormal Pap smear of cervix        Past Surgical History:  Past Surgical History:   Procedure Laterality Date     SECTION      x3    ELBOW SURGERY Right     HEMORRHOID SURGERY      TUBAL LIGATION         Family History:  family history includes Breast cancer in her paternal aunt and paternal grandmother.    Social History:  Social History     Socioeconomic History    Marital status:    Tobacco Use    Smoking status: Never Smoker    Smokeless tobacco: Never Used   Substance and Sexual Activity    Alcohol use: Yes     Comment: socially    Drug use: Never    Sexual activity: Yes     Partners: Male     Birth control/protection: See Surgical Hx       Review of Systems:   ROS      Medications:  Outpatient Encounter Medications as of 2022   Medication Sig Dispense Refill    buPROPion (WELLBUTRIN XL) 150 MG TB24 tablet TAKE ONE TABLET BY MOUTH EVERY DAY 30 tablet 5    busPIRone (BUSPAR) 5 MG Tab Take 1 tablet (5 mg total) by mouth 2 (two) times daily. 60 tablet 5    ondansetron (ZOFRAN) 4  MG tablet Take 1 tablet (4 mg total) by mouth every 8 (eight) hours as needed for Nausea. 30 tablet 0     No facility-administered encounter medications on file as of 7/11/2022.        Allergies:  Review of patient's allergies indicates:  No Known Allergies      Physical Exam         Vitals:    07/11/22 1427   BP: 118/78   Pulse: 73   Resp: 18   Temp: 97.6 °F (36.4 °C)         Physical Exam     Laboratory:  CBC:  No results for input(s): WBC, RBC, HGB, HCT, PLT, MCV, MCH, MCHC in the last 2160 hours.  CMP:  No results for input(s): GLU, CALCIUM, ALBUMIN, PROT, NA, K, CO2, CL, BUN, ALKPHOS, ALT, AST, BILITOT in the last 2160 hours.    Invalid input(s): CREATININ  URINALYSIS:  No results for input(s): COLORU, CLARITYU, SPECGRAV, PHUR, PROTEINUA, GLUCOSEU, BILIRUBINCON, BLOODU, WBCU, RBCU, BACTERIA, MUCUS, NITRITE, LEUKOCYTESUR, UROBILINOGEN, HYALINECASTS in the last 2160 hours.   LIPIDS:  No results for input(s): TSH, HDL, CHOL, TRIG, LDLCALC, CHOLHDL, NONHDLCHOL, TOTALCHOLEST in the last 2160 hours.  TSH:  No results for input(s): TSH in the last 2160 hours.  A1C:  No results for input(s): HGBA1C in the last 2160 hours.    Radiology:        Assessment:     Mary Kate Lima is a 46 y.o.female with:    Annual physical exam  -     CBC Auto Differential; Future; Expected date: 07/11/2022  -     Comprehensive Metabolic Panel; Future; Expected date: 07/11/2022  -     Lipid Panel; Future; Expected date: 07/11/2022  -     TSH; Future; Expected date: 07/11/2022    Depression, major, recurrent, mild          Plan:     Problem List Items Addressed This Visit        Psychiatric    Depression, major, recurrent, mild    Overview     Stable on current regimen is doing well               Other    Annual physical exam - Primary    Overview     pe documented needs full screening labs   Cont current regimen   Will make recs when all reviewed                  As above, continue current medications and maintain follow up with specialists.   Return to clinic in 6 months.      Frederick W Dantagnan [facility] [place]                  Answers for HPI/ROS submitted by the patient on 7/8/2022  activity change: No  unexpected weight change: No  rhinorrhea: No  trouble swallowing: No  visual disturbance: No  chest tightness: No  polyuria: No  difficulty urinating: No  menstrual problem: No  joint swelling: No  arthralgias: No  confusion: No  dysphoric mood: No

## 2022-07-14 ENCOUNTER — TELEPHONE (OUTPATIENT)
Dept: INTERNAL MEDICINE | Facility: CLINIC | Age: 47
End: 2022-07-14
Payer: COMMERCIAL

## 2022-07-15 ENCOUNTER — PATIENT MESSAGE (OUTPATIENT)
Dept: ADMINISTRATIVE | Facility: HOSPITAL | Age: 47
End: 2022-07-15
Payer: COMMERCIAL

## 2022-07-20 DIAGNOSIS — F41.9 ANXIETY: ICD-10-CM

## 2022-07-20 RX ORDER — BUSPIRONE HYDROCHLORIDE 5 MG/1
5 TABLET ORAL 2 TIMES DAILY
Qty: 60 TABLET | Refills: 5 | Status: SHIPPED | OUTPATIENT
Start: 2022-07-20 | End: 2022-12-29

## 2022-09-07 RX ORDER — BUPROPION HYDROCHLORIDE 150 MG/1
TABLET ORAL
Qty: 90 TABLET | Refills: 3 | Status: SHIPPED | OUTPATIENT
Start: 2022-09-07 | End: 2022-12-20

## 2022-09-07 NOTE — TELEPHONE ENCOUNTER
Refill Decision Note   Mary Kate Clarence  is requesting a refill authorization.  Brief Assessment and Rationale for Refill:  Approve     Medication Therapy Plan:       Medication Reconciliation Completed: No   Comments:     No Care Gaps recommended.     Note composed:6:34 PM 09/07/2022

## 2022-09-07 NOTE — TELEPHONE ENCOUNTER
No new care gaps identified.  [facility] Embedded Care Gaps. Reference number: [id number]. 9/07/2022   8:01:00 AM CDT

## 2022-09-08 ENCOUNTER — PATIENT MESSAGE (OUTPATIENT)
Dept: INTERNAL MEDICINE | Facility: CLINIC | Age: 47
End: 2022-09-08
Payer: COMMERCIAL

## 2022-10-10 PROBLEM — Z00.00 ANNUAL PHYSICAL EXAM: Status: RESOLVED | Noted: 2020-07-20 | Resolved: 2022-10-10

## 2022-12-05 RX ORDER — SEMAGLUTIDE 1.34 MG/ML
INJECTION, SOLUTION SUBCUTANEOUS
Qty: 1 PEN | Refills: 3 | Status: SHIPPED | OUTPATIENT
Start: 2022-12-05 | End: 2023-03-20

## 2022-12-05 NOTE — TELEPHONE ENCOUNTER
No new care gaps identified.  [facility] Embedded Care Gaps. Reference number: [id number]. 12/05/2022   8:02:15 AM CST

## 2022-12-05 NOTE — TELEPHONE ENCOUNTER
Refill Routing Note   Medication(s) are not appropriate for processing by [facility] for the following reason(s):      - Required laboratory values are outdated    [facility] action(s):  Defer          Medication reconciliation completed: No     Appointments  past 12m or future 3m with PCP    Date Provider   Last Visit   7/11/2022 Donnell Mancilla MD   Next Visit   1/12/2023 oDnnell Mancilla MD   ED visits in past 90 days: 0        Note composed:10:31 AM 12/05/2022

## 2022-12-20 ENCOUNTER — LAB VISIT (OUTPATIENT)
Dept: LAB | Facility: HOSPITAL | Age: 47
End: 2022-12-20
Payer: COMMERCIAL

## 2022-12-20 ENCOUNTER — OFFICE VISIT (OUTPATIENT)
Dept: PSYCHIATRY | Facility: CLINIC | Age: 47
End: 2022-12-20
Payer: COMMERCIAL

## 2022-12-20 DIAGNOSIS — F33.2 SEVERE EPISODE OF RECURRENT MAJOR DEPRESSIVE DISORDER, WITHOUT PSYCHOTIC FEATURES: Primary | ICD-10-CM

## 2022-12-20 DIAGNOSIS — F33.2 SEVERE EPISODE OF RECURRENT MAJOR DEPRESSIVE DISORDER, WITHOUT PSYCHOTIC FEATURES: ICD-10-CM

## 2022-12-20 DIAGNOSIS — F40.10 SOCIAL ANXIETY DISORDER: ICD-10-CM

## 2022-12-20 LAB
ALBUMIN SERPL BCP-MCNC: 4.5 G/DL (ref 3.5–5.2)
ALP SERPL-CCNC: 57 U/L (ref 55–135)
ALT SERPL W/O P-5'-P-CCNC: 11 U/L (ref 10–44)
ANION GAP SERPL CALC-SCNC: 9 MMOL/L (ref 8–16)
AST SERPL-CCNC: 20 U/L (ref 10–40)
BASOPHILS # BLD AUTO: 0.06 K/UL (ref 0–0.2)
BASOPHILS NFR BLD: 0.8 % (ref 0–1.9)
BILIRUB SERPL-MCNC: 0.4 MG/DL (ref 0.1–1)
BUN SERPL-MCNC: 12 MG/DL (ref 6–20)
CALCIUM SERPL-MCNC: 9.6 MG/DL (ref 8.7–10.5)
CHLORIDE SERPL-SCNC: 105 MMOL/L (ref 95–110)
CO2 SERPL-SCNC: 25 MMOL/L (ref 23–29)
CREAT SERPL-MCNC: 0.7 MG/DL (ref 0.5–1.4)
DIFFERENTIAL METHOD: ABNORMAL
EOSINOPHIL # BLD AUTO: 0.3 K/UL (ref 0–0.5)
EOSINOPHIL NFR BLD: 4 % (ref 0–8)
ERYTHROCYTE [DISTWIDTH] IN BLOOD BY AUTOMATED COUNT: 13.2 % (ref 11.5–14.5)
EST. GFR  (NO RACE VARIABLE): >60 ML/MIN/1.73 M^2
GLUCOSE SERPL-MCNC: 101 MG/DL (ref 70–110)
HCT VFR BLD AUTO: 40 % (ref 37–48.5)
HGB BLD-MCNC: 12.5 G/DL (ref 12–16)
IMM GRANULOCYTES # BLD AUTO: 0.03 K/UL (ref 0–0.04)
IMM GRANULOCYTES NFR BLD AUTO: 0.4 % (ref 0–0.5)
LYMPHOCYTES # BLD AUTO: 2.6 K/UL (ref 1–4.8)
LYMPHOCYTES NFR BLD: 33.3 % (ref 18–48)
MCH RBC QN AUTO: 27 PG (ref 27–31)
MCHC RBC AUTO-ENTMCNC: 31.3 G/DL (ref 32–36)
MCV RBC AUTO: 86 FL (ref 82–98)
MONOCYTES # BLD AUTO: 0.7 K/UL (ref 0.3–1)
MONOCYTES NFR BLD: 9.3 % (ref 4–15)
NEUTROPHILS # BLD AUTO: 4.1 K/UL (ref 1.8–7.7)
NEUTROPHILS NFR BLD: 52.2 % (ref 38–73)
NRBC BLD-RTO: 0 /100 WBC
PLATELET # BLD AUTO: 309 K/UL (ref 150–450)
PMV BLD AUTO: 11.5 FL (ref 9.2–12.9)
POTASSIUM SERPL-SCNC: 4 MMOL/L (ref 3.5–5.1)
PROT SERPL-MCNC: 7.8 G/DL (ref 6–8.4)
RBC # BLD AUTO: 4.63 M/UL (ref 4–5.4)
SODIUM SERPL-SCNC: 139 MMOL/L (ref 136–145)
TSH SERPL DL<=0.005 MIU/L-ACNC: 1.32 UIU/ML (ref 0.4–4)
WBC # BLD AUTO: 7.81 K/UL (ref 3.9–12.7)

## 2022-12-20 PROCEDURE — 99417 PR PROLONGED SVC, OUTPT, W/WO DIRECT PT CONTACT,  EA ADDTL 15 MIN: ICD-10-PCS | Mod: S$GLB,,, | Performed by: STUDENT IN AN ORGANIZED HEALTH CARE EDUCATION/TRAINING PROGRAM

## 2022-12-20 PROCEDURE — 99999 PR PBB SHADOW E&M-EST. PATIENT-LVL I: ICD-10-PCS | Mod: PBBFAC,,, | Performed by: STUDENT IN AN ORGANIZED HEALTH CARE EDUCATION/TRAINING PROGRAM

## 2022-12-20 PROCEDURE — 99999 PR PBB SHADOW E&M-EST. PATIENT-LVL I: CPT | Mod: PBBFAC,,, | Performed by: STUDENT IN AN ORGANIZED HEALTH CARE EDUCATION/TRAINING PROGRAM

## 2022-12-20 PROCEDURE — 36415 COLL VENOUS BLD VENIPUNCTURE: CPT | Performed by: STUDENT IN AN ORGANIZED HEALTH CARE EDUCATION/TRAINING PROGRAM

## 2022-12-20 PROCEDURE — 99205 OFFICE O/P NEW HI 60 MIN: CPT | Mod: S$GLB,,, | Performed by: STUDENT IN AN ORGANIZED HEALTH CARE EDUCATION/TRAINING PROGRAM

## 2022-12-20 PROCEDURE — 84443 ASSAY THYROID STIM HORMONE: CPT | Performed by: STUDENT IN AN ORGANIZED HEALTH CARE EDUCATION/TRAINING PROGRAM

## 2022-12-20 PROCEDURE — 99417 PROLNG OP E/M EACH 15 MIN: CPT | Mod: S$GLB,,, | Performed by: STUDENT IN AN ORGANIZED HEALTH CARE EDUCATION/TRAINING PROGRAM

## 2022-12-20 PROCEDURE — 85025 COMPLETE CBC W/AUTO DIFF WBC: CPT | Performed by: STUDENT IN AN ORGANIZED HEALTH CARE EDUCATION/TRAINING PROGRAM

## 2022-12-20 PROCEDURE — 80053 COMPREHEN METABOLIC PANEL: CPT | Performed by: STUDENT IN AN ORGANIZED HEALTH CARE EDUCATION/TRAINING PROGRAM

## 2022-12-20 PROCEDURE — 99205 PR OFFICE/OUTPT VISIT, NEW, LEVL V, 60-74 MIN: ICD-10-PCS | Mod: S$GLB,,, | Performed by: STUDENT IN AN ORGANIZED HEALTH CARE EDUCATION/TRAINING PROGRAM

## 2022-12-20 RX ORDER — BUPROPION HYDROCHLORIDE 300 MG/1
300 TABLET ORAL DAILY
Qty: 30 TABLET | Refills: 11 | Status: SHIPPED | OUTPATIENT
Start: 2022-12-20 | End: 2023-01-26 | Stop reason: SDUPTHER

## 2022-12-20 NOTE — PROGRESS NOTES
"      [facility]  DEPARTMENT OF PSYCHIATRY    INITIAL PSYCHIATRIC EVALUATION  Outpatient Clinic    EXAMINING PRACTITIONER: Deepti CEDENO MD      KEY:     I[]I Y = II[x][]II = Yes / Present / Present Though Denies / Endorses  I[]I N = II[][x]II = No / Absent / Absent Though Endorses / Denies  I[]I U = II[][]II = Unknown / Unable to Assess/Enact / Unwilling to Participate/Provide  I[]I A = II[x][x]II = Ambiguity / Uncertainty of Accuracy Exists  I[]I D = Denial or Minimization is Suspected/Evident  I[]I N/A = Non-Applicable      CHIEF COMPLAINT:     Patient Name: Mary Kate Lima  YOB: 1975  MRN: [id number]    Mary Kate Lima is a 47 y.o. female who is being seen by me for an initial psychiatric evaluation.  Mary Kate Lima presents with the chief complaint depression/intattention     CHART REVIEW:     Available documentation has been reviewed, and pertinent elements of the chart have been incorporated into this evaluation where appropriate.    Reviewed chart and recent labs    PRESENTATION:     HISTORY OF PRESENT ILLNESS:     In Summary:  Pt is a 46 y/o F who presents to [facility] care. Reports lifelong hx of recurrent depression that previous had been stable with wellbutrin 150mgXL and buspar 5mg"PRN". Has tried cymbalta/trintellix- did not like them. Pt also was taking ozempic for weight loss as well as zofran, but is no longer taking. Tolerates wellbutrin without SE, thinks it initially helped her depression. States the buspar makes her feel "Dizzy," but also states she does not drink much water and often feels dizzy on standing. Reviewed hydration.     Pt reports a 7-8 month hx of worsening depression. Pt reports poor sleep, fair appetite, amotivation, dysphoric/dysthymic mood, tearfulness, prominent thoughts of guilt and worthlessness. Reports she is sad "all the time." Concentration and focus have worsened with her depression. Has had episodes of passive SI approximately 1x/week where she " "feels like her  and children would be "better off without her." Denies active plan for SI, and pt does not own a gun. Reviewed safety plan and emergency precaution for worsening SI/active SI. Denies any self harm.     Pt also describes severe anxiety, particularly social anxiety. Feels other people see her as "intimidating" and this is upsetting to her. Worries constantly about having said the wrong thing or embarrassing herself in social situations.     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 States she "hates her job." 3 kids- 24,22,16, living at home. Happy marriage x26 years. Works out every day.     Sees a therapist. Does admit to being "obsessive" about exercise and her weight, but denies restrictive eating or ever binge/purging.   No family hx of depression or bipolar disorder. No manic episodes. No AVH no delusions            .  REVIEW OF SYSTEMS:     A pertinent medical review of systems was performed.    Review of Systems      II[][x]II pain: denies  II[x][]II cardiac symptoms: arrhythima, not sure what. Reports it was not serious and she has not seen cardiology in 2-3 years  II[][x]II abnormal movements: denies    PSYCHIATRIC ROS:     I[]I Patient denies any pertinent Psychiatric ROS, and none is known.  I[]I Patient unable or unwilling to provide any Psychiatric ROS.    II[x][]II sleep disturbance: Positive for: initial insomnia, terminal insomnia, night-time awakenings, inadequate total sleep time  II[x][]II appetite/weight change: lost weight on ozempic. No longer taking.   II[][x]II fatigue/anergia: denies  II[x][]II impairment in focus/concentration: Positive for: diminished ability to think or concentrate, easy distractibility     II[x][]II depression: Positive for: intermittent sadness, depressed mood, dysphoria, dysthymia, anhedonia, decreased libido, worthlessness, excessive or inappropriate guilt, helplessness, tearfulness, clinical " depression   II[x][]II anxiety/worry: social anxiety, worries about her failures, ruminations, money  II[][x]II dysregulated mood/behavior:denies  II[][x]II manic symptomatology: denies  II[][x]II psychosis: denies        HISTORY:     I[x]I Y  I[]I N  I[]I U  Psychiatric Diagnoses: MDD, social anxiety d/o  I[]I Y  I[x]I N  I[]I U  Current Psychiatric Provider (if Applicable):   I[]I Y  I[x]I N  I[]I U  Hx of Psychiatric Hospitalization:   I[x]I Y  I[]I N  I[]I U  Hx of Outpatient Psychiatric Treatment (psychiatry/psychotherapy): by PCP, also has an outpatient therapist virtually  I[x]I Y  I[]I N  I[]I U  Psychotropic Trials: cymbalta, trintellix, wellbutrin, buspar  I[]I Y  I[x]I N  I[]I U  Prior Suicide Attempts:   I[x]I Y  I[]I N  I[]I U  Hx of Suicidal Ideation: see HPI  I[]I Y  I[x]I N  I[]I U  Hx of Homicidal Ideation:   I[]I Y  I[x]I N  I[]I U  Hx of Self-Injurious Behavior (Non-Suicidal):   I[]I Y  I[x]I N  I[]I U  Hx of Violence:   I[]I Y  I[x]I N  I[]I U  Documented Hx of Malingering:       SUBSTANCE USE:     Alcohol use on the weekends, rare binge drinking (4-5 drinks). No nicotine. Infrequent (2-3 months) Edible use.     TRAUMA:     I[x]I Patient denies any history of trauma, abuse or neglect, and none is known.  I[]I Patient unable or unwilling to provide any history of trauma, abuse, or neglect.    I[]I Y  I[x]I N  I[]I U  Hx of Trauma/Neglect:   I[]I Y  I[x]I N  I[]I U  Hx of Physical Abuse:   I[]I Y  I[x]I N  I[]I U  Hx of Sexual Abuse:         FAMILY:   Denies family hx of mental illness     SOCIAL:     I[]I Patient unable or unwilling to provide any social history.    II[x][]II Grew Up Locally?:   II[x][]II Happy Childhood?:   II[][x]II Significant Developmental Delay/Disability?:   II[x][]II GED/High School Dipoloma?:   II[x][]II Post High School Education?:   II[x][]II Currently Employed?:   II[][x]II On or Applying for Disability?:   II[x][]II Functions Independently?:   II[x][]II Financially  Stable?:   II[x][]II Domiciled?:   II[][x]II Lives Alone?:   II[x][]II Heterosexual/Cisgender?:   II[x][]II Currently in a Romantic Relationship?:   II[x][]II Ever ?:   II[x][]II Children/Dependents?:   II[][x]II Buddhism/Spiritual?:   II[][x]II  History?:   II[x][]II Engaged in Hobbies/Recreational Activities?:   II[][x]II Access to a Gun?:     Additional Relevant Social History, As Applicable:       LEGAL:     I[x]I Patient denies any legal history, and none is known.  I[]I Patient unable or unwilling to provide any legal history.    Additional Relevant Past Psychiatric History, As Applicable:       MEDICAL:     The patient's past medical history has been reviewed and updated as appropriate within the electronic medical record system.    has Abnormal uterine bleeding (AUB) and Depression, major, recurrent, mild on their problem list.     NEUROLOGIC:     I[]I Y  I[x]I N  I[]I U  Hx of Seizure:   I[]I Y  I[x]I N  I[]I U  Hx of Significant Head Trauma (e.g., Loss of Consciousness, Concussion, Coma):      Additional Relevant Neurologic History, As Applicable:       MEDICATIONS:       Scheduled and PRN Medications:   The electronic chart was reviewed and updated as appropriate.  See [email or web address] for details.    Current Outpatient Medications:     buPROPion (WELLBUTRIN XL) 150 MG TB24 tablet, TAKE ONE TABLET BY MOUTH EVERY DAY, Disp: 90 tablet, Rfl: 3    busPIRone (BUSPAR) 5 MG Tab, Take 1 tablet (5 mg total) by mouth 2 (two) times daily., Disp: 60 tablet, Rfl: 5    ondansetron (ZOFRAN) 4 MG tablet, Take 1 tablet (4 mg total) by mouth every 8 (eight) hours as needed for Nausea., Disp: 30 tablet, Rfl: 0    semaglutide (OZEMPIC) 0.25 mg or 0.5 mg(2 mg/1.5 mL) pen injector, inject 0.5mg UNDER THE SKIN EVERY WEEK FOR FOUR WEEKS, Disp: 1 pen, Rfl: 3    ALLERGIES:   Patient has no known allergies.    RISK:     RELIABILITY, ACCURACY & AGENCY:     The patient is deemed to be a reliable and factually accurate  historian.    Inconsistencies between patient reporting, collateral information, and/or chart review are absent.    Legal Status: The patient is currently here on a voluntary legal status.    MITIGATION:     Risk Mitigation Strategies, Harm Reduction Techniques, and Safety Netting are important interventions that can reduce acute and chronic risk, and as such opportunities were sought to incorporate them into the encounter, to the degree possible.  Written material has been provided to supplement, augment, and reinforce any discussions and interventions, via the AVS or other pre-printed handouts.    PRESCRIPTION DRUG MONITORING:     LA/MS  AWARE  Site reviewed - No recent discrepancies or irregularities are noted.      FIREARMS:     Access to Firearms:   See above for screening on access to firearms.  NOTE: patient counseled on gun safety.  NOTE: patient counseled on increased risks associated with gun ownership.      III[x]III  RISK PARAMETERS:     The following risk parameters were assessed during this evaluation:    Screened for SI, HI, AVH, delusions, self harm. Negative with the exception of passive SI approximately 1x week, without plan or intent. Will monitor for frequency or worsening. Reviewed plan to go to ER if sx worsen or she has a plan for suicide. Lists children as protective factors.     III[x]III  CLINICAL RISK ASSESSMENT:      The patient was able to satisfactorily contract for safety and was noted to be future oriented.  Protective factors were identified.     WM-Yxxcectd-Uvinnjyuzdqckbd: Currently does not meet or exceed the threshold for psychiatric hospitalization, as the patient can be managed safely and successfully in a less restrictive level of care or the community.    III[x]III  CLINICAL RISK DETERMINATION:     Current risk is judged to be:  I[]I Low    I[x]I Moderate   I[]I High    The following criteria were met for involuntary psychiatric admission:   I[x]I None    I[]I Dangerous to  Self    I[]I Dangerous to Others    I[]I Gravely Disabled      EXAMINATION:     VITALS:     There were no vitals taken for this visit.    MENTAL STATUS EXAMINATION:     Mental Status Exam:  Appearance: unremarkable, age appropriate  Behavior/Cooperation: appropriate normal, cooperative  Speech: appropriate rate, volume and tone normal tone, normal rate, normal pitch, normal volume  Language: uses words appropriately; NO aphasia or dysarthria  Mood: sad, tearful  Affect:  congruent with mood and appropriate to situation/content   Thought Process: normal and logical  Thought Content: suicidal thoughts: (passive-yes). No active SI. No HI, AVH, delusions  Level of Consciousness: Alert and Oriented x3  Memory:  Intact  Attention/concentration: appropriate for age/education.   Fund of Knowledge: appears adequate  Insight: Intact  Judgment: Intact      ASSESSMENT:     A diagnostic psychiatric evaluation was performed and responsiveness to treatment was assessed.  The patient demonstrates adequate ability/capacity to respond to treatment.    PSYCHOSOCIAL FACTORS  Stressors (Biopsychosocial, Cultural and Environmental): job/career, finances, mental health      III[x]III  INITIAL DIAGNOSES AND PROBLEMS:             .  Major depressive disorder, severe, without psychotic features  Social anxiety disorder    PLAN:     IMPRESSION AND RECOMMENDATIONS:     MANAGEMENT PLAN, TREATMENT GOALS, THERAPEUTIC TECHNIQUES/APPROACHES & CLINICAL REASONING:     Mrs. Lima is a 48 y/o woman with pphx of MDD and SAD presenting for establishment of care. Pt's depressive sx have been steadily worsening x 7-8 months, with passive SI once weekly. At this time she denies active SI, listing her children as protective factors, and is future oriented. Contracts for safety and reviewed safety plan/ED precautions. Also with social anxiety that is likely exacerbated by her depression. Pt with preoccupation with appearance/weight though denies eating  disorder at this time and is currently  at a healthy weight and appears fit and well nourished. Good support system but financial struggles. Does not want to take a medication that will make her gain weight. Will try increasing wellbutrin to 300 mg XL daily- reviewed risks/benefits/SE including but not limited to seizures. Also reviewed buspar- advised her to try taking daily while drinking plenty of water and monitor for side effects. May titrate this in the future. No active SI, HI, AVH.     - increase wellbutrin to 300 mg XL  - take buspar daily  - limit alcohol  - RTC 2-4 weeks  - ED precautions   .  III[x]III  PRESCRIPTION DRUG MANAGEMENT:     Prescription Drug Management entails the review, recommendation, or consideration without recommendation of medications, and as such was employed during the encounter.    Discussed, to the extent possible, diagnosis, risks and benefits of proposed treatment vs alternative treatments vs no treatment, potential side effects of these treatments and the inherent unpredictability of treatment. The patient expresses understanding of the above and displays the capacity to agree with this treatment given said understanding. Patient also agrees that, currently, the benefits outweigh the risks and would like to pursue treatment at this time.     Written material has additionally been provided, via the AVS or other pre-printed handouts.        MEDICAL DECISION MAKING:     Shared medical decision making and informed consent are the hallmark and bedrock of good clinical care, and as such have been employed and obtained, respectively, to the degree possible.  Written material has been provided to supplement, augment, and reinforce any discussions and interventions, via the AVS or other pre-printed handouts.    Additional psychoeducation has been provided, as warranted.      LEVEL OF MEDICAL DECISION MAKING AND TIME     The total time for services performed on the date of the encounter:  90 minutes       Deepti Lopez MD  Department of Psychiatry  [facility]

## 2022-12-29 DIAGNOSIS — F41.9 ANXIETY: ICD-10-CM

## 2022-12-29 RX ORDER — BUSPIRONE HYDROCHLORIDE 5 MG/1
TABLET ORAL
Qty: 60 TABLET | Refills: 5 | Status: SHIPPED | OUTPATIENT
Start: 2022-12-29 | End: 2023-01-26

## 2023-01-17 ENCOUNTER — PATIENT MESSAGE (OUTPATIENT)
Dept: PSYCHIATRY | Facility: CLINIC | Age: 48
End: 2023-01-17
Payer: COMMERCIAL

## 2023-01-17 DIAGNOSIS — F40.10 SOCIAL ANXIETY DISORDER: Primary | ICD-10-CM

## 2023-01-17 RX ORDER — HYDROXYZINE HYDROCHLORIDE 25 MG/1
25 TABLET, FILM COATED ORAL DAILY PRN
Qty: 15 TABLET | Refills: 0 | Status: SHIPPED | OUTPATIENT
Start: 2023-01-17 | End: 2023-11-30

## 2023-01-26 ENCOUNTER — OFFICE VISIT (OUTPATIENT)
Dept: PSYCHIATRY | Facility: CLINIC | Age: 48
End: 2023-01-26
Payer: COMMERCIAL

## 2023-01-26 DIAGNOSIS — F40.10 SOCIAL ANXIETY DISORDER: ICD-10-CM

## 2023-01-26 DIAGNOSIS — F33.2 SEVERE EPISODE OF RECURRENT MAJOR DEPRESSIVE DISORDER, WITHOUT PSYCHOTIC FEATURES: Primary | ICD-10-CM

## 2023-01-26 PROCEDURE — 99999 PR PBB SHADOW E&M-EST. PATIENT-LVL I: CPT | Mod: PBBFAC,,, | Performed by: STUDENT IN AN ORGANIZED HEALTH CARE EDUCATION/TRAINING PROGRAM

## 2023-01-26 PROCEDURE — 90833 PSYTX W PT W E/M 30 MIN: CPT | Mod: S$GLB,,, | Performed by: STUDENT IN AN ORGANIZED HEALTH CARE EDUCATION/TRAINING PROGRAM

## 2023-01-26 PROCEDURE — 99215 OFFICE O/P EST HI 40 MIN: CPT | Mod: S$GLB,,, | Performed by: STUDENT IN AN ORGANIZED HEALTH CARE EDUCATION/TRAINING PROGRAM

## 2023-01-26 PROCEDURE — 99999 PR PBB SHADOW E&M-EST. PATIENT-LVL I: ICD-10-PCS | Mod: PBBFAC,,, | Performed by: STUDENT IN AN ORGANIZED HEALTH CARE EDUCATION/TRAINING PROGRAM

## 2023-01-26 PROCEDURE — 90833 PR PSYCHOTHERAPY W/PATIENT W/E&M, 30 MIN (ADD ON): ICD-10-PCS | Mod: S$GLB,,, | Performed by: STUDENT IN AN ORGANIZED HEALTH CARE EDUCATION/TRAINING PROGRAM

## 2023-01-26 PROCEDURE — 99215 PR OFFICE/OUTPT VISIT, EST, LEVL V, 40-54 MIN: ICD-10-PCS | Mod: S$GLB,,, | Performed by: STUDENT IN AN ORGANIZED HEALTH CARE EDUCATION/TRAINING PROGRAM

## 2023-01-26 RX ORDER — BUPROPION HYDROCHLORIDE 300 MG/1
300 TABLET ORAL DAILY
Qty: 30 TABLET | Refills: 11 | Status: SHIPPED | OUTPATIENT
Start: 2023-01-26 | End: 2023-08-29 | Stop reason: SDUPTHER

## 2023-01-26 RX ORDER — BUSPIRONE HYDROCHLORIDE 10 MG/1
10 TABLET ORAL 2 TIMES DAILY
Qty: 60 TABLET | Refills: 11 | Status: SHIPPED | OUTPATIENT
Start: 2023-01-26 | End: 2023-08-29 | Stop reason: SDUPTHER

## 2023-01-26 RX ORDER — TRAZODONE HYDROCHLORIDE 50 MG/1
50 TABLET ORAL NIGHTLY
Qty: 30 TABLET | Refills: 11 | Status: SHIPPED | OUTPATIENT
Start: 2023-01-26 | End: 2023-08-29 | Stop reason: SDUPTHER

## 2023-01-26 NOTE — PROGRESS NOTES
[facility]  DEPARTMENT OF PSYCHIATRY    ESTABLISHED OUTPATIENT VISIT     EXAMINING PRACTITIONER: Deepti Lopez MD      KEY:     I[]I Y = II[x][]II = Yes / Present / Present Though Denies / Endorses  I[]I N = II[][x]II = No / Absent / Absent Though Endorses / Denies  I[]I U = II[][]II = Unknown / Unable to Assess/Enact / Unwilling to Participate/Provide  I[]I A = II[x][x]II = Ambiguity / Uncertainty of Accuracy Exists  I[]I D = Denial or Minimization is Suspected/Evident  I[]I N/A = Non-Applicable    CHIEF COMPLAINT:     Patient Name: Mary Kate Lima  YOB: 1975  MRN: [id number]    Mary Kate Lima is a 47 y.o. female who is being seen by me for a follow up visit.  Mary Kate Lima presents with the chief complaint f/u for depression     CHART REVIEW:     Available documentation has been reviewed, and pertinent elements of the chart have been incorporated into this evaluation where appropriate.    The patient's last encounter with me was on: 12/20/2022  The patient's last encounter in the psychiatry department was on: 12/20/2022    PRESENTATION:     HPI, PSYCHIATRIC ROS & APPLICABLE MEDICAL ROS:   .  Pt presents for f/u of depression. We had increased her wellbutrin to 300 mg XL. Feels like things have slightly gotten better. Still having some episodes of crying and tearfulness. Thinks SI has gotten better- has not had any thoughts of suicide since she was last seen. Has never had a plan for suicide. [place] like she's been a little hungrier. Also tried hydroxyzine PRN for anxiety during flight which she said was helpful.     Family is still doing well.     Has been taking the buspar as prescribed, no longer PRN.     She is taking 2 advil Pms and 2 melatonins (10) for sleep. Can't sleep- feels like she is up worrying all night.     Anxiety has been slightly worse.     Needs to f/u with therapy.    Pt has not gone through menopause yet, but has an OB appointment because she is having heavy bleeding.    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                .  CURRENT PSYCHOTROPIC REGIMEN:     Wellbutrin 300 mg XL  Buspar 5 mg BID      HISTORY:       PAST PSYCHOTROPIC TRIALS:     cymbalta, trintellix, wellbutrin, buspar      ASSESSMENT:     III[x]III  DIAGNOSES AND PROBLEMS:     Major depressive disorder, in partial remission  Social anxiety disorder  Insomnia           .    Pt improving slowly, but still with sx of depression and very severe anxiety. She is sensitive about weight gain and does not want medication that increases appetite.             .  PLAN:     IMPRESSION AND RECOMMENDATIONS:     MANAGEMENT PLAN, TREATMENT GOALS, THERAPEUTIC TECHNIQUES/APPROACHES & CLINICAL REASONING:    - continue wellbutrin 300 mg XL daily  - increase buspirone to 10 mg BID  -ok to take hydroxyzine PRN for anxiety  - RTC 1 month  - f/u with therapy    No SI, HI, AVH. RTC 1 month. Reviewed ED precautions. Reviewed risks, benefits, SE of medication and all pt concerns and questions addressed.             .  III[x]III  PRESCRIPTION DRUG MANAGEMENT:     Prescription Drug Management entails the review, recommendation, or consideration without recommendation of medications, and as such was employed during the encounter.    Discussed, to the extent possible, diagnosis, risks and benefits of proposed treatment vs alternative treatments vs no treatment, potential side effects of these treatments and the inherent unpredictability of treatment. The patient expresses understanding of the above and displays the capacity to agree with this treatment given said understanding. Patient also agrees that, currently, the benefits outweigh the risks and would like to pursue treatment at this time.     Written material has additionally been provided, via the AVS or other pre-printed handouts.      EXAMINATION:     VITALS:     There were no vitals taken for this visit.    MENTAL STATUS EXAMINATION:     Mental Status Exam:  Appearance: unremarkable, age  appropriate  Behavior/Cooperation: lppropriate normal, cooperative  Speech: appropriate rate, volume and tone normal tone, normal rate, normal pitch, normal volume  Language: uses words appropriately; NO aphasia or dysarthria  Mood: depressed, dysthymic  Affect:  congruent with mood and appropriate to situation/content   Thought Process: normal and logical  Thought Content: normal, no suicidality, no homicidality, delusions, or paranoia  Level of Consciousness: Alert and Oriented x3  Memory:  Intact  Attention/concentration: appropriate for age/education.   Fund of Knowledge: appears adequate  Insight:  Intact  Judgment: Intact      RISK:     MITIGATION:     Risk Mitigation Strategies, Harm Reduction Techniques, and Safety Netting are important interventions that can reduce acute and chronic risk.  Written material has been provided to supplement, augment, and reinforce any discussions and interventions, via the AVS or other pre-printed handouts.    PRESCRIPTION DRUG MONITORING:     LA/MS  AWARE  Site reviewed - No recent discrepancies or irregularities are noted.    MEDICAL DECISION MAKING:     Shared medical decision making and informed consent are the hallmark and bedrock of good clinical care, and as such have been employed and obtained, respectively, to the degree possible.  Written material has been provided to supplement, augment, and reinforce any discussions and interventions, via the AVS or other pre-printed handouts.    Additional psychoeducation has been provided, as warranted.      LEVEL OF MEDICAL DECISION MAKING AND TIME:     The total time for services performed on the date of the encounter: 76 minutes  PSYCHOTHERAPY ADD-ON [id number] 30 minutes (range 16-37 minutes)  Therapeutic intervention type: supportive psychotherapy  Why chosen therapy is appropriate versus another modality: relevant to diagnosis  Target symptoms addressed: depression, recurrent depression, anxiety   Topics and themes  "discussed: work stress, parenting issues  Primary focus: discussed anxiety around work and with colleagues, "mind reading," catastrophizing  Psychotherapeutic techniques employed: active listening, empathic responses, and psychoeducation  Outcome monitoring methods: self-report  The patient's response to the intervention is: accepting.   The patient's progress toward treatment goals is: limited.  Duration of intervention: 20 minutes      Deepti Lopez MD  Department of Psychiatry  [facility]      DATA:     DIAGNOSTIC TESTING:     The chart was reviewed for recent diagnostic procedures and investigations, and pertinent results are noted below.    WEIGHTS & VITALS:     Wt Readings from Last 2 Encounters:   07/11/22 63.6 kg (140 lb 3.4 oz)   05/03/22 71.3 kg (157 lb 3 oz)     BP Readings from Last 1 Encounters:   07/11/22 118/78     Pulse Readings from Last 1 Encounters:   07/11/22 73        PERTINENT LABORATORY RESULTS:     Blood Counts, Electrolytes & Glucose: (i.e. WBC, ANC, Hemoglobin, Hematocrit, MCV, Platelets)  Lab Results   Component Value Date    WBC 7.81 12/20/2022    GRAN 4.1 12/20/2022    GRAN 52.2 12/20/2022    HGB 12.5 12/20/2022    HCT 40.0 12/20/2022    MCV 86 12/20/2022     12/20/2022     12/20/2022    K 4.0 12/20/2022    CALCIUM 9.6 12/20/2022    CO2 25 12/20/2022    ANIONGAP 9 12/20/2022     12/20/2022       Renal, Liver, Pancreas, Thyroid, Parathyroid, Prolactin, CPK, Lipids & Vitamin Levels: (i.e. Cr, BUN, Anion Gap, GFR, Urine Specific Gravity, Urine Protein, Microalburnin, AST, ALT, GGT, Alk Phos,Total Bili, Total Protein, Albumin, Ammonia, INR, Amylase, Lipase, TSH, Total T3, Total T4, Free T4 PTH, Prolactin, CPK, Cholesterol, Triglycerides, LDH, HDL, Vitamin B12, Folate, Vitamin D)  Lab Results   Component Value Date    CREATININE 0.7 12/20/2022    BUN 12 12/20/2022    EGFRNORACEVR >60.0 12/20/2022    AST 20 12/20/2022    ALT 11 12/20/2022    ALKPHOS 57 12/20/2022    " BILITOT 0.4 12/20/2022    ALBUMIN 4.5 12/20/2022    TSH 1.322 12/20/2022       Infection Diseases, Pregnancy Screenings & Drug Levels: (i.e. Hepatitis Panel, HIV, Syphilis, Urine & Blood Pregnancy Screens, beta hCG, Lithium, Valproic Acid, Carbamazepine, Lamotrigine, Phenytoin, Phenobarbital, Clozapine, Norclozapine, Clozapine + Norclozapine)   No results found for: HAV, HEPAIGM, HEPBIGM, HEPBCAB, HBEAG, HEPCAB, RAPIDHIV, HIV1X2, VAB67NMHV, CU8DDFZS, ABSOLUTECD4, RPR, PREGTESTUR, PREGSERUM, HCG, HCGQUANT, LITHIUM, VALPROATE, CBMZ, LAMOTRIGINE, PHENYTOIN, PHENOBARB, CLOZAPINE, NORCLOZAP, CLOZNORCLOZ    Addiction: (i.e. Urine Toxicology, Blood Alcohol, PETH, EtG, EtS, CDT, Buprenorphine, Norbuprenorphine)  No results found for: PCDSOALCOHOL, PCDSOBENZOD, BARBITURATES, PCDSCOMETHA, OPIATESCREEN, COCAINEMETAB, AMPHETAMINES, MARIJUANATHC, PCDSOPHENCYN, PCDSUBUPRE, PCDSUFENTANY, PCDSUOXYCOD, PCDSUTRAMA, [id number], PETH, ALCOHOLMEDIC, THEYLGLUCU, ETHYLSULF, CDT, BUPRENORPH, NORBUPRENOR    CARDIAC:     No results found for this or any previous visit.

## 2023-01-31 DIAGNOSIS — K52.9 GASTROENTERITIS: ICD-10-CM

## 2023-02-01 RX ORDER — ONDANSETRON 4 MG/1
4 TABLET, FILM COATED ORAL EVERY 8 HOURS PRN
Qty: 30 TABLET | Refills: 0 | Status: SHIPPED | OUTPATIENT
Start: 2023-02-01 | End: 2023-05-26 | Stop reason: SDUPTHER

## 2023-02-01 NOTE — TELEPHONE ENCOUNTER
Care Due:                  Date            Visit Type   Department     Provider  --------------------------------------------------------------------------------                                MYCHART                              ANNUAL                              CHECKUP/PHY  [facility] PRIMARY  Last Visit: 07-      [facility]           Donnell Mancilla  Next Visit: None Scheduled  None         None Found                                                            Last  Test          Frequency    Reason                     Performed    Due Date  --------------------------------------------------------------------------------    HBA1C.......  6 months...  semaglutide..............  Not Found    Overdue    [employer] Embedded Care Gaps. Reference number: [id number]. 1/31/2023   7:16:38 PM CST

## 2023-03-20 ENCOUNTER — OFFICE VISIT (OUTPATIENT)
Dept: OBSTETRICS AND GYNECOLOGY | Facility: CLINIC | Age: 48
End: 2023-03-20
Attending: STUDENT IN AN ORGANIZED HEALTH CARE EDUCATION/TRAINING PROGRAM
Payer: COMMERCIAL

## 2023-03-20 VITALS
WEIGHT: 130.19 LBS | HEIGHT: 66 IN | DIASTOLIC BLOOD PRESSURE: 70 MMHG | BODY MASS INDEX: 20.92 KG/M2 | SYSTOLIC BLOOD PRESSURE: 108 MMHG

## 2023-03-20 DIAGNOSIS — Z12.4 SCREENING FOR CERVICAL CANCER: ICD-10-CM

## 2023-03-20 DIAGNOSIS — Z11.51 SCREENING FOR HPV (HUMAN PAPILLOMAVIRUS): ICD-10-CM

## 2023-03-20 DIAGNOSIS — Z01.419 WELL WOMAN EXAM: Primary | ICD-10-CM

## 2023-03-20 DIAGNOSIS — N93.9 ABNORMAL UTERINE BLEEDING (AUB): ICD-10-CM

## 2023-03-20 PROCEDURE — 3078F PR MOST RECENT DIASTOLIC BLOOD PRESSURE < 80 MM HG: ICD-10-PCS | Mod: CPTII,S$GLB,, | Performed by: STUDENT IN AN ORGANIZED HEALTH CARE EDUCATION/TRAINING PROGRAM

## 2023-03-20 PROCEDURE — 99396 PREV VISIT EST AGE 40-64: CPT | Mod: S$GLB,,, | Performed by: STUDENT IN AN ORGANIZED HEALTH CARE EDUCATION/TRAINING PROGRAM

## 2023-03-20 PROCEDURE — 3074F SYST BP LT 130 MM HG: CPT | Mod: CPTII,S$GLB,, | Performed by: STUDENT IN AN ORGANIZED HEALTH CARE EDUCATION/TRAINING PROGRAM

## 2023-03-20 PROCEDURE — 87624 HPV HI-RISK TYP POOLED RSLT: CPT | Performed by: STUDENT IN AN ORGANIZED HEALTH CARE EDUCATION/TRAINING PROGRAM

## 2023-03-20 PROCEDURE — 99999 PR PBB SHADOW E&M-EST. PATIENT-LVL III: ICD-10-PCS | Mod: PBBFAC,,, | Performed by: STUDENT IN AN ORGANIZED HEALTH CARE EDUCATION/TRAINING PROGRAM

## 2023-03-20 PROCEDURE — 3078F DIAST BP <80 MM HG: CPT | Mod: CPTII,S$GLB,, | Performed by: STUDENT IN AN ORGANIZED HEALTH CARE EDUCATION/TRAINING PROGRAM

## 2023-03-20 PROCEDURE — 99999 PR PBB SHADOW E&M-EST. PATIENT-LVL III: CPT | Mod: PBBFAC,,, | Performed by: STUDENT IN AN ORGANIZED HEALTH CARE EDUCATION/TRAINING PROGRAM

## 2023-03-20 PROCEDURE — 88175 CYTOPATH C/V AUTO FLUID REDO: CPT | Performed by: STUDENT IN AN ORGANIZED HEALTH CARE EDUCATION/TRAINING PROGRAM

## 2023-03-20 PROCEDURE — 99396 PR PREVENTIVE VISIT,EST,40-64: ICD-10-PCS | Mod: S$GLB,,, | Performed by: STUDENT IN AN ORGANIZED HEALTH CARE EDUCATION/TRAINING PROGRAM

## 2023-03-20 PROCEDURE — 3008F PR BODY MASS INDEX (BMI) DOCUMENTED: ICD-10-PCS | Mod: CPTII,S$GLB,, | Performed by: STUDENT IN AN ORGANIZED HEALTH CARE EDUCATION/TRAINING PROGRAM

## 2023-03-20 PROCEDURE — 3008F BODY MASS INDEX DOCD: CPT | Mod: CPTII,S$GLB,, | Performed by: STUDENT IN AN ORGANIZED HEALTH CARE EDUCATION/TRAINING PROGRAM

## 2023-03-20 PROCEDURE — 3074F PR MOST RECENT SYSTOLIC BLOOD PRESSURE < 130 MM HG: ICD-10-PCS | Mod: CPTII,S$GLB,, | Performed by: STUDENT IN AN ORGANIZED HEALTH CARE EDUCATION/TRAINING PROGRAM

## 2023-03-20 PROCEDURE — 1159F PR MEDICATION LIST DOCUMENTED IN MEDICAL RECORD: ICD-10-PCS | Mod: CPTII,S$GLB,, | Performed by: STUDENT IN AN ORGANIZED HEALTH CARE EDUCATION/TRAINING PROGRAM

## 2023-03-20 PROCEDURE — 1159F MED LIST DOCD IN RCRD: CPT | Mod: CPTII,S$GLB,, | Performed by: STUDENT IN AN ORGANIZED HEALTH CARE EDUCATION/TRAINING PROGRAM

## 2023-03-20 NOTE — PROGRESS NOTES
Chief Complaint: Well Woman Exam     HPI:      Mary Kate Lima is a 47 y.o.  who presents today for well woman exam.  LMP: Patient's last menstrual period was 2023.   Does report cycles have continued to be heavy and has not had eval. No other issues, problems, or complaints. Specifically, patient denies abnormal vaginal bleeding, discharge, pelvic pain, urinary problems, or changes in appetite. Ms. Lima is currently sexually active with a single male partner. She is currently using bilateral tubal ligation for contraception. She declines STD screening today.    Previous Pap: 3/20/2023 no abnormalities, HPV neg  Mammo  Negative    OB History          3    Para   3    Term   3            AB        Living   3         SAB        IAB        Ectopic        Multiple        Live Births   3           Obstetric Comments   Menarche age 14             Past Medical History:   Diagnosis Date    Abnormal Pap smear of cervix      Past Surgical History:   Procedure Laterality Date     SECTION      x3    ELBOW SURGERY Right     HEMORRHOID SURGERY      TUBAL LIGATION       Social History     Socioeconomic History    Marital status:    Tobacco Use    Smoking status: Never    Smokeless tobacco: Never   Substance and Sexual Activity    Alcohol use: Yes     Comment: socially    Drug use: Never    Sexual activity: Yes     Partners: Male     Birth control/protection: See Surgical Hx     Family History   Problem Relation Age of Onset    Breast cancer Paternal Grandmother     Breast cancer Paternal Aunt     Colon cancer Neg Hx     Ovarian cancer Neg Hx        Current Outpatient Medications:     buPROPion (WELLBUTRIN XL) 300 MG 24 hr tablet, Take 1 tablet (300 mg total) by mouth once daily., Disp: 30 tablet, Rfl: 11    busPIRone (BUSPAR) 10 MG tablet, Take 1 tablet (10 mg total) by mouth 2 (two) times daily., Disp: 60 tablet, Rfl: 11    hydrOXYzine HCL (ATARAX) 25 MG tablet, Take 1 tablet (25 mg  "total) by mouth daily as needed for Anxiety (anxiety)., Disp: 15 tablet, Rfl: 0    ondansetron (ZOFRAN) 4 MG tablet, Take 1 tablet (4 mg total) by mouth every 8 (eight) hours as needed for Nausea., Disp: 30 tablet, Rfl: 0    traZODone (DESYREL) 50 MG tablet, Take 1 tablet (50 mg total) by mouth every evening., Disp: 30 tablet, Rfl: 11    ROS:     GENERAL: Denies unintentional weight gain or weight loss. Feeling well overall.   SKIN: Denies rash or lesions.   HEENT: Denies headaches, or vision changes.   CARDIOVASCULAR: Denies palpitations or chest pain.   RESPIRATORY: Denies shortness of breath or dyspnea on exertion.  BREASTS: Denies pain, lumps, or nipple discharge.   ABDOMEN: Denies abdominal pain, constipation, diarrhea, nausea, vomiting, change in appetite.  URINARY: Denies frequency, dysuria, hematuria.  NEUROLOGIC: Denies syncope or weakness.   PSYCHIATRIC: Denies depression, anxiety or mood swings.    Physical Exam:      PHYSICAL EXAM:  /70   Ht 5' 6" (1.676 m)   Wt 59.1 kg (130 lb 2.9 oz)   LMP 03/12/2023   BMI 21.01 kg/m²   Body mass index is 21.01 kg/m².     APPEARANCE: Well nourished, well developed, in no acute distress.  PSYCH: Appropriate mood and affect.  SKIN: No acne or hirsutism.  NECK: Neck symmetric without masses or thyromegaly.  NODES: No inguinal, axillary, or supraclavicular lymph node enlargement.  CHEST: Normal respiratory effort.    CARDIOVASCULAR:  Regular rate and rhythm.  BREASTS: Symmetrical, no skin changes or visible lesions.  No palpable masses or nipple discharge bilaterally.  ABDOMEN: Soft.  No tenderness or masses.    PELVIC: Normal external genitalia without lesions.  Normal hair distribution.  Adequate perineal body, normal urethral meatus.  Vagina moist and well rugated without lesions or discharge.  Cervix pink, without lesions, discharge or tenderness.  No significant cystocele or rectocele.  Bimanual exam shows uterus to be normal size, regular, mobile and " nontender.  Adnexa without masses or tenderness.    EXTREMITIES: No edema.  No tenderness to palpation.    Assessment/Plan:     47 y.o.     Well woman exam    Abnormal uterine bleeding (AUB)  -     US Pelvis Comp with Transvag NON-OB (xpd; Future; Expected date: 2023    Screening for cervical cancer  -     Liquid-Based Pap Smear, Screening    Screening for HPV (human papillomavirus)  -     HPV High Risk Genotypes, PCR            Counselin.  Annual exam performed today without difficulty.  Patient was counseled today on current ASCCP pap guidelines, the recommendation for yearly pelvic exams, healthy diet and exercise routines, annual mammograms.  Pap smear collected.  All questions answered.    2.  Contraception:  BTL.  3.  AUB:  US, EMB, TSH, CBC.    4.  Follow up with PCP for other health maintenance.  5.  Follow up in about 1 year (around 3/20/2024).      Use of the [facility] Patient Portal discussed and encouraged during today's visit.

## 2023-03-24 ENCOUNTER — PATIENT MESSAGE (OUTPATIENT)
Dept: OBSTETRICS AND GYNECOLOGY | Facility: CLINIC | Age: 48
End: 2023-03-24
Payer: COMMERCIAL

## 2023-03-27 ENCOUNTER — OFFICE VISIT (OUTPATIENT)
Dept: OBSTETRICS AND GYNECOLOGY | Facility: CLINIC | Age: 48
End: 2023-03-27
Attending: STUDENT IN AN ORGANIZED HEALTH CARE EDUCATION/TRAINING PROGRAM
Payer: COMMERCIAL

## 2023-03-27 VITALS
BODY MASS INDEX: 21.08 KG/M2 | HEIGHT: 66 IN | WEIGHT: 131.19 LBS | SYSTOLIC BLOOD PRESSURE: 122 MMHG | DIASTOLIC BLOOD PRESSURE: 66 MMHG

## 2023-03-27 DIAGNOSIS — N93.9 ABNORMAL UTERINE BLEEDING (AUB): ICD-10-CM

## 2023-03-27 DIAGNOSIS — Z32.02 NEGATIVE PREGNANCY TEST: Primary | ICD-10-CM

## 2023-03-27 LAB
B-HCG UR QL: NEGATIVE
CTP QC/QA: YES
FINAL PATHOLOGIC DIAGNOSIS: NORMAL
HPV HR 12 DNA SPEC QL NAA+PROBE: NEGATIVE
HPV16 AG SPEC QL: NEGATIVE
HPV18 DNA SPEC QL NAA+PROBE: NEGATIVE
Lab: NORMAL

## 2023-03-27 PROCEDURE — 81025 POCT URINE PREGNANCY: ICD-10-PCS | Mod: S$GLB,,, | Performed by: STUDENT IN AN ORGANIZED HEALTH CARE EDUCATION/TRAINING PROGRAM

## 2023-03-27 PROCEDURE — 81025 URINE PREGNANCY TEST: CPT | Mod: S$GLB,,, | Performed by: STUDENT IN AN ORGANIZED HEALTH CARE EDUCATION/TRAINING PROGRAM

## 2023-03-27 PROCEDURE — 3074F SYST BP LT 130 MM HG: CPT | Mod: CPTII,S$GLB,, | Performed by: STUDENT IN AN ORGANIZED HEALTH CARE EDUCATION/TRAINING PROGRAM

## 2023-03-27 PROCEDURE — 99999 PR PBB SHADOW E&M-EST. PATIENT-LVL III: CPT | Mod: PBBFAC,,, | Performed by: STUDENT IN AN ORGANIZED HEALTH CARE EDUCATION/TRAINING PROGRAM

## 2023-03-27 PROCEDURE — 58100 ENDOMETRIAL BIOPSY: ICD-10-PCS | Mod: S$GLB,,, | Performed by: STUDENT IN AN ORGANIZED HEALTH CARE EDUCATION/TRAINING PROGRAM

## 2023-03-27 PROCEDURE — 1159F MED LIST DOCD IN RCRD: CPT | Mod: CPTII,S$GLB,, | Performed by: STUDENT IN AN ORGANIZED HEALTH CARE EDUCATION/TRAINING PROGRAM

## 2023-03-27 PROCEDURE — 88305 TISSUE EXAM BY PATHOLOGIST: CPT | Mod: 26,,, | Performed by: PATHOLOGY

## 2023-03-27 PROCEDURE — 99999 PR PBB SHADOW E&M-EST. PATIENT-LVL III: ICD-10-PCS | Mod: PBBFAC,,, | Performed by: STUDENT IN AN ORGANIZED HEALTH CARE EDUCATION/TRAINING PROGRAM

## 2023-03-27 PROCEDURE — 99214 OFFICE O/P EST MOD 30 MIN: CPT | Mod: 25,S$GLB,, | Performed by: STUDENT IN AN ORGANIZED HEALTH CARE EDUCATION/TRAINING PROGRAM

## 2023-03-27 PROCEDURE — 99214 PR OFFICE/OUTPT VISIT, EST, LEVL IV, 30-39 MIN: ICD-10-PCS | Mod: 25,S$GLB,, | Performed by: STUDENT IN AN ORGANIZED HEALTH CARE EDUCATION/TRAINING PROGRAM

## 2023-03-27 PROCEDURE — 3078F PR MOST RECENT DIASTOLIC BLOOD PRESSURE < 80 MM HG: ICD-10-PCS | Mod: CPTII,S$GLB,, | Performed by: STUDENT IN AN ORGANIZED HEALTH CARE EDUCATION/TRAINING PROGRAM

## 2023-03-27 PROCEDURE — 88305 TISSUE EXAM BY PATHOLOGIST: CPT | Performed by: PATHOLOGY

## 2023-03-27 PROCEDURE — 3078F DIAST BP <80 MM HG: CPT | Mod: CPTII,S$GLB,, | Performed by: STUDENT IN AN ORGANIZED HEALTH CARE EDUCATION/TRAINING PROGRAM

## 2023-03-27 PROCEDURE — 88305 TISSUE EXAM BY PATHOLOGIST: ICD-10-PCS | Mod: 26,,, | Performed by: PATHOLOGY

## 2023-03-27 PROCEDURE — 1159F PR MEDICATION LIST DOCUMENTED IN MEDICAL RECORD: ICD-10-PCS | Mod: CPTII,S$GLB,, | Performed by: STUDENT IN AN ORGANIZED HEALTH CARE EDUCATION/TRAINING PROGRAM

## 2023-03-27 PROCEDURE — 3008F BODY MASS INDEX DOCD: CPT | Mod: CPTII,S$GLB,, | Performed by: STUDENT IN AN ORGANIZED HEALTH CARE EDUCATION/TRAINING PROGRAM

## 2023-03-27 PROCEDURE — 58100 BIOPSY OF UTERUS LINING: CPT | Mod: S$GLB,,, | Performed by: STUDENT IN AN ORGANIZED HEALTH CARE EDUCATION/TRAINING PROGRAM

## 2023-03-27 PROCEDURE — 3074F PR MOST RECENT SYSTOLIC BLOOD PRESSURE < 130 MM HG: ICD-10-PCS | Mod: CPTII,S$GLB,, | Performed by: STUDENT IN AN ORGANIZED HEALTH CARE EDUCATION/TRAINING PROGRAM

## 2023-03-27 PROCEDURE — 3008F PR BODY MASS INDEX (BMI) DOCUMENTED: ICD-10-PCS | Mod: CPTII,S$GLB,, | Performed by: STUDENT IN AN ORGANIZED HEALTH CARE EDUCATION/TRAINING PROGRAM

## 2023-03-27 RX ORDER — NORETHINDRONE ACETATE AND ETHINYL ESTRADIOL .02; 1 MG/1; MG/1
1 TABLET ORAL DAILY
Qty: 30 TABLET | Refills: 11 | Status: SHIPPED | OUTPATIENT
Start: 2023-03-27 | End: 2024-02-02

## 2023-03-27 NOTE — PROCEDURES
Endometrial biopsy    Date/Time: 3/27/2023 3:00 PM  Performed by: Tg Smith MD  Authorized by: Tg Smith MD     Consent:     Consent obtained:  Written    Consent given by:  Patient    Patient questions answered: yes      Patient agrees, verbalizes understanding, and wants to proceed: yes      Instructions and paperwork completed: yes    Indication:     Indications: Menorrhagia    Pre-procedure:     Pre-procedure timeout performed: yes    Procedure:     Procedure: endometrial biopsy with Pipelle      Cervix cleaned and prepped: yes      Uterus sounded: yes      Specimen collected: specimen collected and sent to pathology      Patient tolerated procedure well with no complications: yes    Comments:     Procedure comments:  Hemostasis noted.

## 2023-04-03 LAB
FINAL PATHOLOGIC DIAGNOSIS: NORMAL
GROSS: NORMAL
Lab: NORMAL

## 2023-04-04 ENCOUNTER — TELEPHONE (OUTPATIENT)
Dept: OBSTETRICS AND GYNECOLOGY | Facility: HOSPITAL | Age: 48
End: 2023-04-04
Payer: COMMERCIAL

## 2023-04-04 DIAGNOSIS — N93.9 ABNORMAL UTERINE BLEEDING (AUB): Primary | ICD-10-CM

## 2023-04-04 NOTE — TELEPHONE ENCOUNTER
Spoke with patient.  EMB negative.  Will try OCP.  Wants hysteroscopic myomectomy in June.  Will set up.    Requested Date:  June 20    Requested Time:  0700    Case Length:60 minutes    Surgeon: Tg Smith      Assistant Surgeon: None   Visit Type: Outpatient    LOCATION: [facility]    PROCEDURE:hysteroscopy D&C, hysteroscopic myomectomy  Diagnosis:Aub, Uterine fibroid  Anesthesia type: General   Comments/Special Equipment Needed:  Rep needed: no  Does the patient need a PreOp appointment with MD: yes  U/S needed at pre-op: no  PCP clearance: Not Needed  When does she need her Post op appointment: 2 weeks in person  Do you need additional time blocked out from clinic? no  If so, what time do you want to be back in clinic? na  When can the patient go back to work? one week

## 2023-05-18 ENCOUNTER — PATIENT MESSAGE (OUTPATIENT)
Dept: OBSTETRICS AND GYNECOLOGY | Facility: CLINIC | Age: 48
End: 2023-05-18
Payer: COMMERCIAL

## 2023-05-18 DIAGNOSIS — Z12.31 OTHER SCREENING MAMMOGRAM: ICD-10-CM

## 2023-05-26 DIAGNOSIS — K52.9 GASTROENTERITIS: ICD-10-CM

## 2023-05-26 RX ORDER — ONDANSETRON 4 MG/1
4 TABLET, FILM COATED ORAL EVERY 8 HOURS PRN
Qty: 30 TABLET | Refills: 0 | Status: SHIPPED | OUTPATIENT
Start: 2023-05-26 | End: 2023-08-02 | Stop reason: SDUPTHER

## 2023-05-26 NOTE — TELEPHONE ENCOUNTER
No care due was identified.  [facility] Embedded Care Due Messages. Reference number: [id number].   5/26/2023 8:12:19 AM CDT

## 2023-06-08 ENCOUNTER — PATIENT MESSAGE (OUTPATIENT)
Dept: OBSTETRICS AND GYNECOLOGY | Facility: CLINIC | Age: 48
End: 2023-06-08
Payer: COMMERCIAL

## 2023-06-09 ENCOUNTER — HOSPITAL ENCOUNTER (OUTPATIENT)
Dept: RADIOLOGY | Facility: HOSPITAL | Age: 48
Discharge: HOME | End: 2023-06-09
Attending: INTERNAL MEDICINE
Payer: COMMERCIAL

## 2023-06-09 VITALS — BODY MASS INDEX: 20.57 KG/M2 | HEIGHT: 66 IN | WEIGHT: 128 LBS

## 2023-06-09 DIAGNOSIS — Z12.31 OTHER SCREENING MAMMOGRAM: ICD-10-CM

## 2023-06-09 PROCEDURE — 77063 BREAST TOMOSYNTHESIS BI: CPT | Mod: 26,,, | Performed by: RADIOLOGY

## 2023-06-09 PROCEDURE — 77063 MAMMO DIGITAL SCREENING BILAT WITH TOMO: ICD-10-PCS | Mod: 26,,, | Performed by: RADIOLOGY

## 2023-06-09 PROCEDURE — 77067 SCR MAMMO BI INCL CAD: CPT | Mod: 26,,, | Performed by: RADIOLOGY

## 2023-06-09 PROCEDURE — 77067 SCR MAMMO BI INCL CAD: CPT | Mod: TC

## 2023-06-09 PROCEDURE — 77067 MAMMO DIGITAL SCREENING BILAT WITH TOMO: ICD-10-PCS | Mod: 26,,, | Performed by: RADIOLOGY

## 2023-07-05 ENCOUNTER — PATIENT MESSAGE (OUTPATIENT)
Dept: OBSTETRICS AND GYNECOLOGY | Facility: CLINIC | Age: 48
End: 2023-07-05
Payer: COMMERCIAL

## 2023-07-05 NOTE — TELEPHONE ENCOUNTER
Lets schedule a f/u with Dr Louis in a month just in case she has irreg bleeing this next cycle  Can be a televsiit

## 2023-07-05 NOTE — TELEPHONE ENCOUNTER
Pt has been on OCP for the past 2 months for AUB with no VB.  Now on 3rd pack and started having VB.  Has been bleeding for 3 weeks with some days heavier than others and also passing clots.  Supposed to start period on Monday but requesting something to help VB stop.      Allergies and Pharm UTD    Please advise, thanks

## 2023-07-05 NOTE — TELEPHONE ENCOUNTER
Stop pills for 5 days - then start a new pack   Lets see if this resolves the bleeding   How heavy is she bleeding

## 2023-08-02 DIAGNOSIS — K52.9 GASTROENTERITIS: ICD-10-CM

## 2023-08-02 RX ORDER — ONDANSETRON 4 MG/1
4 TABLET, FILM COATED ORAL EVERY 8 HOURS PRN
Qty: 30 TABLET | Refills: 0 | Status: SHIPPED | OUTPATIENT
Start: 2023-08-02 | End: 2024-03-18

## 2023-08-02 NOTE — TELEPHONE ENCOUNTER
No care due was identified.  [facility] Embedded Care Due Messages. Reference number: [id number].   8/02/2023 11:15:10 AM CDT

## 2023-08-28 ENCOUNTER — PATIENT MESSAGE (OUTPATIENT)
Dept: PSYCHIATRY | Facility: CLINIC | Age: 48
End: 2023-08-28
Payer: COMMERCIAL

## 2023-08-29 ENCOUNTER — OFFICE VISIT (OUTPATIENT)
Dept: PSYCHIATRY | Facility: CLINIC | Age: 48
End: 2023-08-29
Payer: COMMERCIAL

## 2023-08-29 DIAGNOSIS — F33.2 SEVERE EPISODE OF RECURRENT MAJOR DEPRESSIVE DISORDER, WITHOUT PSYCHOTIC FEATURES: Primary | ICD-10-CM

## 2023-08-29 DIAGNOSIS — F40.10 SOCIAL ANXIETY DISORDER: ICD-10-CM

## 2023-08-29 PROCEDURE — 99214 PR OFFICE/OUTPT VISIT, EST, LEVL IV, 30-39 MIN: ICD-10-PCS | Mod: 95,,, | Performed by: STUDENT IN AN ORGANIZED HEALTH CARE EDUCATION/TRAINING PROGRAM

## 2023-08-29 PROCEDURE — 99214 OFFICE O/P EST MOD 30 MIN: CPT | Mod: 95,,, | Performed by: STUDENT IN AN ORGANIZED HEALTH CARE EDUCATION/TRAINING PROGRAM

## 2023-08-29 RX ORDER — HYDROXYZINE HYDROCHLORIDE 25 MG/1
25 TABLET, FILM COATED ORAL 3 TIMES DAILY PRN
Qty: 90 TABLET | Refills: 2 | Status: SHIPPED | OUTPATIENT
Start: 2023-08-29 | End: 2024-03-18

## 2023-08-29 RX ORDER — BUPROPION HYDROCHLORIDE 300 MG/1
300 TABLET ORAL DAILY
Qty: 30 TABLET | Refills: 11 | Status: SHIPPED | OUTPATIENT
Start: 2023-08-29 | End: 2023-11-06

## 2023-08-29 RX ORDER — BUSPIRONE HYDROCHLORIDE 10 MG/1
10 TABLET ORAL 2 TIMES DAILY
Qty: 60 TABLET | Refills: 11 | Status: SHIPPED | OUTPATIENT
Start: 2023-08-29 | End: 2023-11-06 | Stop reason: SDUPTHER

## 2023-08-29 RX ORDER — BUPROPION HYDROCHLORIDE 150 MG/1
150 TABLET ORAL DAILY
Qty: 30 TABLET | Refills: 11 | Status: SHIPPED | OUTPATIENT
Start: 2023-08-29 | End: 2023-11-06

## 2023-08-29 RX ORDER — TRAZODONE HYDROCHLORIDE 50 MG/1
50 TABLET ORAL NIGHTLY
Qty: 30 TABLET | Refills: 11 | Status: SHIPPED | OUTPATIENT
Start: 2023-08-29 | End: 2023-11-06 | Stop reason: SDUPTHER

## 2023-08-29 NOTE — PROGRESS NOTES
[facility]  DEPARTMENT OF PSYCHIATRY    ESTABLISHED OUTPATIENT VISIT     EXAMINING PRACTITIONER: Deepti Lopez MD      KEY:     I[]I Y = II[x][]II = Yes / Present / Present Though Denies / Endorses  I[]I N = II[][x]II = No / Absent / Absent Though Endorses / Denies  I[]I U = II[][]II = Unknown / Unable to Assess/Enact / Unwilling to Participate/Provide  I[]I A = II[x][x]II = Ambiguity / Uncertainty of Accuracy Exists  I[]I D = Denial or Minimization is Suspected/Evident  I[]I N/A = Non-Applicable    CHIEF COMPLAINT:     Patient Name: Mary Kate Lima  YOB: 1975  MRN: [id number]    Mary Kate Lima is a 47 y.o. female who is being seen by me for a follow up visit.  Mary Kate Lima presents with the chief complaint f/u for depression     CHART REVIEW:     Available documentation has been reviewed, and pertinent elements of the chart have been incorporated into this evaluation where appropriate.    The patient's last encounter with me was on: 1/26/2023  The patient's last encounter in the psychiatry department was on: 1/26/2023    PRESENTATION:     HPI, PSYCHIATRIC ROS & APPLICABLE MEDICAL ROS:   .  Pt presents for follow up- last seen several months ago. PT states she had been feeling well for a long time. She feels a significant recurrence of her depression and anxious symptoms. 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 She feels like she is going down the same hole that she was in January. She is still taking the same medications- wellbutrin 300 mg XL, busprione 10 mg BID, trazodone 50 mg. No significant changes at home- teenage daughter has anxiety. Still worries about money. Sleep had been good- she was taking the trazdone, but she is now waking up repeatedly in the night. When she wakes up her mind starts racing. No new health problems she has been diagnosed with. She has not had any thoughts of suicide or self harm. . No HI, no AVH. Talked about risks of increasing  wellbutrin. Talked about her risks factors for seizure - she denies all. She is still taking the buspirone 10 mg BID- felt like it was making her lightheaded. She is very anxious about work.   NO new health problems or health diagnoses.                .  CURRENT PSYCHOTROPIC REGIMEN:     Wellbutrin 300 mg XL  Buspar 10 mg BID  Trazodone 50      HISTORY:       PAST PSYCHOTROPIC TRIALS:     cymbalta, trintellix, wellbutrin, buspar      ASSESSMENT:     III[x]III  DIAGNOSES AND PROBLEMS:     Major depressive disorder, moderate, recurrent  Social anxiety disorder  Insomnia                       .  PLAN:     IMPRESSION AND RECOMMENDATIONS:     MANAGEMENT PLAN, TREATMENT GOALS, THERAPEUTIC TECHNIQUES/APPROACHES & CLINICAL REASONING:  - increase wellbutrin to 450 mg XL daily. Reviewed risks, including the risk for seizure, with the patient   - continue buspirone 10 mg BID- discussed r/b/SE including but not limited to hypotension  - continue trazodone 50 qhs for sleep. Reviewed risks, benefits, SE including serotonin syndrome  -ok to take hydroxyzine PRN for anxiety  - RTC 1 month  - f/u with therapy  - reviewed ER precautions and emergency numbers     No SI, HI, AVH. RTC 1 month. Reviewed ED precautions. Reviewed risks, benefits, SE of medication and all pt concerns and questions addressed.             .  III[x]III  PRESCRIPTION DRUG MANAGEMENT:     Prescription Drug Management entails the review, recommendation, or consideration without recommendation of medications, and as such was employed during the encounter.    Discussed, to the extent possible, diagnosis, risks and benefits of proposed treatment vs alternative treatments vs no treatment, potential side effects of these treatments and the inherent unpredictability of treatment. The patient expresses understanding of the above and displays the capacity to agree with this treatment given said understanding. Patient also agrees that, currently, the benefits outweigh the  risks and would like to pursue treatment at this time.     Written material has additionally been provided, via the AVS or other pre-printed handouts.      EXAMINATION:     VITALS:     There were no vitals taken for this visit.    MENTAL STATUS EXAMINATION:     Mental Status Exam:  Appearance: unremarkable, age appropriate  Behavior/Cooperation: lppropriate normal, cooperative  Speech: appropriate rate, volume and tone normal tone, normal rate, normal pitch, normal volume  Language: uses words appropriately; NO aphasia or dysarthria  Mood: depressed, dysthymic  Affect:  congruent with mood and appropriate to situation/content   Thought Process: normal and logical  Thought Content: normal, no suicidality, no homicidality, delusions, or paranoia  Level of Consciousness: Alert and Oriented x3  Memory:  Intact  Attention/concentration: appropriate for age/education.   Fund of Knowledge: appears adequate  Insight:  Intact  Judgment: Intact      RISK:     MITIGATION:     Risk Mitigation Strategies, Harm Reduction Techniques, and Safety Netting are important interventions that can reduce acute and chronic risk.  Written material has been provided to supplement, augment, and reinforce any discussions and interventions, via the AVS or other pre-printed handouts.    PRESCRIPTION DRUG MONITORING:     LA/MS  AWARE  Site reviewed - No recent discrepancies or irregularities are noted.    MEDICAL DECISION MAKING:     Shared medical decision making and informed consent are the hallmark and bedrock of good clinical care, and as such have been employed and obtained, respectively, to the degree possible.  Written material has been provided to supplement, augment, and reinforce any discussions and interventions, via the AVS or other pre-printed handouts.    Additional psychoeducation has been provided, as warranted.      LEVEL OF MEDICAL DECISION MAKING AND TIME:     [facility] 4, 22 minutes  Deepti Lopez MD  Department of  "Psychiatry  [facility]      DATA:     DIAGNOSTIC TESTING:     The chart was reviewed for recent diagnostic procedures and investigations, and pertinent results are noted below.    WEIGHTS & VITALS:     Wt Readings from Last 2 Encounters:   06/09/23 58.1 kg (128 lb)   03/27/23 59.5 kg (131 lb 2.8 oz)     BP Readings from Last 1 Encounters:   03/27/23 122/66     Pulse Readings from Last 1 Encounters:   07/11/22 73        PERTINENT LABORATORY RESULTS:     Blood Counts, Electrolytes & Glucose: (i.e. WBC, ANC, Hemoglobin, Hematocrit, MCV, Platelets)  Lab Results   Component Value Date    WBC 7.81 12/20/2022    GRAN 4.1 12/20/2022    GRAN 52.2 12/20/2022    HGB 12.5 12/20/2022    HCT 40.0 12/20/2022    MCV 86 12/20/2022     12/20/2022     12/20/2022    K 4.0 12/20/2022    CALCIUM 9.6 12/20/2022    CO2 25 12/20/2022    ANIONGAP 9 12/20/2022     12/20/2022       Renal, Liver, Pancreas, Thyroid, Parathyroid, Prolactin, CPK, Lipids & Vitamin Levels: (i.e. Cr, BUN, Anion Gap, GFR, Urine Specific Gravity, Urine Protein, Microalburnin, AST, ALT, GGT, Alk Phos,Total Bili, Total Protein, Albumin, Ammonia, INR, Amylase, Lipase, TSH, Total T3, Total T4, Free T4 PTH, Prolactin, CPK, Cholesterol, Triglycerides, LDH, HDL, Vitamin B12, Folate, Vitamin D)  Lab Results   Component Value Date    CREATININE 0.7 12/20/2022    BUN 12 12/20/2022    EGFRNORACEVR >60.0 12/20/2022    AST 20 12/20/2022    ALT 11 12/20/2022    ALKPHOS 57 12/20/2022    BILITOT 0.4 12/20/2022    ALBUMIN 4.5 12/20/2022    TSH 1.322 12/20/2022       Infection Diseases, Pregnancy Screenings & Drug Levels: (i.e. Hepatitis Panel, HIV, Syphilis, Urine & Blood Pregnancy Screens, beta hCG, Lithium, Valproic Acid, Carbamazepine, Lamotrigine, Phenytoin, Phenobarbital, Clozapine, Norclozapine, Clozapine + Norclozapine)   No results found for: "HAV", "HEPAIGM", "HEPBIGM", "HEPBCAB", "HBEAG", "HEPCAB", "RAPIDHIV", "HIV1X2", "[id number]", "EO5HMQOZ", " ""ABSOLUTECD4", "RPR", "PREGTESTUR", "PREGSERUM", "HCG", "HCGQUANT", "LITHIUM", "VALPROATE", "CBMZ", "LAMOTRIGINE", "PHENYTOIN", "PHENOBARB", "CLOZAPINE", "NORCLOZAP", "CLOZNORCLOZ"    Addiction: (i.e. Urine Toxicology, Blood Alcohol, PETH, EtG, EtS, CDT, Buprenorphine, Norbuprenorphine)  No results found for: "PCDSOALCOHOL", "PCDSOBENZOD", "BARBITURATES", "PCDSCOMETHA", "OPIATESCREEN", "COCAINEMETAB", "AMPHETAMINES", "MARIJUANATHC", "PCDSOPHENCYN", "PCDSUBUPRE", "PCDSUFENTANY", "PCDSUOXYCOD", "PCDSUTRAMA", "[id number]", "PETH", "ALCOHOLMEDIC", "THEYLGLUCU", "ETHYLSULF", "CDT", "BUPRENORPH", "NORBUPRENOR"    CARDIAC:     No results found for this or any previous visit.          "

## 2023-11-06 ENCOUNTER — OFFICE VISIT (OUTPATIENT)
Dept: PSYCHIATRY | Facility: CLINIC | Age: 48
End: 2023-11-06
Payer: COMMERCIAL

## 2023-11-06 DIAGNOSIS — F33.41 RECURRENT MAJOR DEPRESSION IN PARTIAL REMISSION: ICD-10-CM

## 2023-11-06 DIAGNOSIS — F40.10 SOCIAL ANXIETY DISORDER: Primary | ICD-10-CM

## 2023-11-06 PROCEDURE — 99214 OFFICE O/P EST MOD 30 MIN: CPT | Mod: 95,,, | Performed by: STUDENT IN AN ORGANIZED HEALTH CARE EDUCATION/TRAINING PROGRAM

## 2023-11-06 PROCEDURE — 99214 PR OFFICE/OUTPT VISIT, EST, LEVL IV, 30-39 MIN: ICD-10-PCS | Mod: 95,,, | Performed by: STUDENT IN AN ORGANIZED HEALTH CARE EDUCATION/TRAINING PROGRAM

## 2023-11-06 RX ORDER — BUPROPION HYDROCHLORIDE 300 MG/1
300 TABLET ORAL DAILY
Qty: 30 TABLET | Refills: 11 | Status: SHIPPED | OUTPATIENT
Start: 2023-11-06 | End: 2024-11-05

## 2023-11-06 RX ORDER — TRAZODONE HYDROCHLORIDE 50 MG/1
50 TABLET ORAL NIGHTLY
Qty: 30 TABLET | Refills: 11 | Status: SHIPPED | OUTPATIENT
Start: 2023-11-06 | End: 2024-11-05

## 2023-11-06 RX ORDER — BUSPIRONE HYDROCHLORIDE 10 MG/1
10 TABLET ORAL 2 TIMES DAILY
Qty: 60 TABLET | Refills: 11 | Status: SHIPPED | OUTPATIENT
Start: 2023-11-06 | End: 2024-11-05

## 2023-11-06 RX ORDER — ALPRAZOLAM 0.25 MG/1
0.25 TABLET ORAL DAILY PRN
Qty: 10 TABLET | Refills: 0 | Status: SHIPPED | OUTPATIENT
Start: 2023-11-06 | End: 2024-04-02
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
## 2023-11-20 ENCOUNTER — LAB VISIT (OUTPATIENT)
Dept: LAB | Facility: HOSPITAL | Age: 48
End: 2023-11-20
Attending: STUDENT IN AN ORGANIZED HEALTH CARE EDUCATION/TRAINING PROGRAM
Payer: COMMERCIAL

## 2023-11-20 DIAGNOSIS — F33.41 RECURRENT MAJOR DEPRESSION IN PARTIAL REMISSION: ICD-10-CM

## 2023-11-20 DIAGNOSIS — F40.10 SOCIAL ANXIETY DISORDER: ICD-10-CM

## 2023-11-20 LAB
ALBUMIN SERPL BCP-MCNC: 4.3 G/DL (ref 3.5–5.2)
ALP SERPL-CCNC: 37 U/L (ref 55–135)
ALT SERPL W/O P-5'-P-CCNC: 17 U/L (ref 10–44)
ANION GAP SERPL CALC-SCNC: 10 MMOL/L (ref 8–16)
AST SERPL-CCNC: 25 U/L (ref 10–40)
BASOPHILS # BLD AUTO: 0.05 K/UL (ref 0–0.2)
BASOPHILS NFR BLD: 0.8 % (ref 0–1.9)
BILIRUB SERPL-MCNC: 0.6 MG/DL (ref 0.1–1)
BUN SERPL-MCNC: 11 MG/DL (ref 6–20)
CALCIUM SERPL-MCNC: 9.6 MG/DL (ref 8.7–10.5)
CHLORIDE SERPL-SCNC: 104 MMOL/L (ref 95–110)
CO2 SERPL-SCNC: 21 MMOL/L (ref 23–29)
CREAT SERPL-MCNC: 0.8 MG/DL (ref 0.5–1.4)
DIFFERENTIAL METHOD: ABNORMAL
EOSINOPHIL # BLD AUTO: 0.1 K/UL (ref 0–0.5)
EOSINOPHIL NFR BLD: 2.3 % (ref 0–8)
ERYTHROCYTE [DISTWIDTH] IN BLOOD BY AUTOMATED COUNT: 16 % (ref 11.5–14.5)
EST. GFR  (NO RACE VARIABLE): >60 ML/MIN/1.73 M^2
GLUCOSE SERPL-MCNC: 77 MG/DL (ref 70–110)
HCT VFR BLD AUTO: 36.6 % (ref 37–48.5)
HGB BLD-MCNC: 11 G/DL (ref 12–16)
IMM GRANULOCYTES # BLD AUTO: 0.02 K/UL (ref 0–0.04)
IMM GRANULOCYTES NFR BLD AUTO: 0.3 % (ref 0–0.5)
LYMPHOCYTES # BLD AUTO: 1.7 K/UL (ref 1–4.8)
LYMPHOCYTES NFR BLD: 27.3 % (ref 18–48)
MCH RBC QN AUTO: 23 PG (ref 27–31)
MCHC RBC AUTO-ENTMCNC: 30.1 G/DL (ref 32–36)
MCV RBC AUTO: 76 FL (ref 82–98)
MONOCYTES # BLD AUTO: 0.6 K/UL (ref 0.3–1)
MONOCYTES NFR BLD: 9.1 % (ref 4–15)
NEUTROPHILS # BLD AUTO: 3.6 K/UL (ref 1.8–7.7)
NEUTROPHILS NFR BLD: 60.2 % (ref 38–73)
NRBC BLD-RTO: 0 /100 WBC
PLATELET # BLD AUTO: 409 K/UL (ref 150–450)
PMV BLD AUTO: 11.6 FL (ref 9.2–12.9)
POTASSIUM SERPL-SCNC: 4.7 MMOL/L (ref 3.5–5.1)
PROT SERPL-MCNC: 7.8 G/DL (ref 6–8.4)
RBC # BLD AUTO: 4.79 M/UL (ref 4–5.4)
SODIUM SERPL-SCNC: 135 MMOL/L (ref 136–145)
TSH SERPL DL<=0.005 MIU/L-ACNC: 1.19 UIU/ML (ref 0.4–4)
WBC # BLD AUTO: 6.05 K/UL (ref 3.9–12.7)

## 2023-11-20 PROCEDURE — 84443 ASSAY THYROID STIM HORMONE: CPT | Performed by: STUDENT IN AN ORGANIZED HEALTH CARE EDUCATION/TRAINING PROGRAM

## 2023-11-20 PROCEDURE — 85025 COMPLETE CBC W/AUTO DIFF WBC: CPT | Performed by: STUDENT IN AN ORGANIZED HEALTH CARE EDUCATION/TRAINING PROGRAM

## 2023-11-20 PROCEDURE — 36415 COLL VENOUS BLD VENIPUNCTURE: CPT | Mod: PO | Performed by: STUDENT IN AN ORGANIZED HEALTH CARE EDUCATION/TRAINING PROGRAM

## 2023-11-20 PROCEDURE — 80053 COMPREHEN METABOLIC PANEL: CPT | Performed by: STUDENT IN AN ORGANIZED HEALTH CARE EDUCATION/TRAINING PROGRAM

## 2023-11-22 ENCOUNTER — PATIENT MESSAGE (OUTPATIENT)
Dept: PSYCHIATRY | Facility: CLINIC | Age: 48
End: 2023-11-22
Payer: COMMERCIAL

## 2023-11-30 ENCOUNTER — OFFICE VISIT (OUTPATIENT)
Dept: PRIMARY CARE CLINIC | Facility: CLINIC | Age: 48
End: 2023-11-30
Payer: COMMERCIAL

## 2023-11-30 VITALS
DIASTOLIC BLOOD PRESSURE: 70 MMHG | OXYGEN SATURATION: 98 % | WEIGHT: 134.94 LBS | HEIGHT: 66 IN | SYSTOLIC BLOOD PRESSURE: 124 MMHG | BODY MASS INDEX: 21.69 KG/M2 | HEART RATE: 76 BPM

## 2023-11-30 DIAGNOSIS — D64.9 ANEMIA, UNSPECIFIED TYPE: ICD-10-CM

## 2023-11-30 DIAGNOSIS — F33.0 DEPRESSION, MAJOR, RECURRENT, MILD: ICD-10-CM

## 2023-11-30 DIAGNOSIS — R00.2 PALPITATIONS: ICD-10-CM

## 2023-11-30 DIAGNOSIS — K59.00 CONSTIPATION, UNSPECIFIED CONSTIPATION TYPE: ICD-10-CM

## 2023-11-30 DIAGNOSIS — Z00.00 ANNUAL PHYSICAL EXAM: Primary | ICD-10-CM

## 2023-11-30 PROCEDURE — 93010 EKG 12-LEAD: ICD-10-PCS | Mod: S$GLB,,, | Performed by: INTERNAL MEDICINE

## 2023-11-30 PROCEDURE — 3078F PR MOST RECENT DIASTOLIC BLOOD PRESSURE < 80 MM HG: ICD-10-PCS | Mod: CPTII,S$GLB,, | Performed by: NURSE PRACTITIONER

## 2023-11-30 PROCEDURE — 1159F PR MEDICATION LIST DOCUMENTED IN MEDICAL RECORD: ICD-10-PCS | Mod: CPTII,S$GLB,, | Performed by: NURSE PRACTITIONER

## 2023-11-30 PROCEDURE — 99999 PR PBB SHADOW E&M-EST. PATIENT-LVL III: CPT | Mod: PBBFAC,,, | Performed by: NURSE PRACTITIONER

## 2023-11-30 PROCEDURE — 3078F DIAST BP <80 MM HG: CPT | Mod: CPTII,S$GLB,, | Performed by: NURSE PRACTITIONER

## 2023-11-30 PROCEDURE — 93005 EKG 12-LEAD: ICD-10-PCS | Mod: S$GLB,,, | Performed by: NURSE PRACTITIONER

## 2023-11-30 PROCEDURE — 1159F MED LIST DOCD IN RCRD: CPT | Mod: CPTII,S$GLB,, | Performed by: NURSE PRACTITIONER

## 2023-11-30 PROCEDURE — 99396 PR PREVENTIVE VISIT,EST,40-64: ICD-10-PCS | Mod: S$GLB,,, | Performed by: NURSE PRACTITIONER

## 2023-11-30 PROCEDURE — 3008F BODY MASS INDEX DOCD: CPT | Mod: CPTII,S$GLB,, | Performed by: NURSE PRACTITIONER

## 2023-11-30 PROCEDURE — 3008F PR BODY MASS INDEX (BMI) DOCUMENTED: ICD-10-PCS | Mod: CPTII,S$GLB,, | Performed by: NURSE PRACTITIONER

## 2023-11-30 PROCEDURE — 93005 ELECTROCARDIOGRAM TRACING: CPT | Mod: S$GLB,,, | Performed by: NURSE PRACTITIONER

## 2023-11-30 PROCEDURE — 99999 PR PBB SHADOW E&M-EST. PATIENT-LVL III: ICD-10-PCS | Mod: PBBFAC,,, | Performed by: NURSE PRACTITIONER

## 2023-11-30 PROCEDURE — 99396 PREV VISIT EST AGE 40-64: CPT | Mod: S$GLB,,, | Performed by: NURSE PRACTITIONER

## 2023-11-30 PROCEDURE — 3074F PR MOST RECENT SYSTOLIC BLOOD PRESSURE < 130 MM HG: ICD-10-PCS | Mod: CPTII,S$GLB,, | Performed by: NURSE PRACTITIONER

## 2023-11-30 PROCEDURE — 3074F SYST BP LT 130 MM HG: CPT | Mod: CPTII,S$GLB,, | Performed by: NURSE PRACTITIONER

## 2023-11-30 PROCEDURE — 93010 ELECTROCARDIOGRAM REPORT: CPT | Mod: S$GLB,,, | Performed by: INTERNAL MEDICINE

## 2023-11-30 NOTE — PROGRESS NOTES
[facility] Note    Chief Complaint      Chief Complaint   Patient presents with    Annual Exam       History of Present Illness      Mary Kate Lima is a 48 y.o. female with chronic conditions of depression and anxiety who presents today for: for annual preventative exam. Denies chest pain. Does endorse feeling more light headed and short of breath, attributes to her anxiety. Also describes feeling lightheaded, and tired. Reports occasional palpitations thought medication related, recently went back down to 300mg Wellbutrin. Denies chest pain/sob. Has frontal headaches mostly every in AM. Just goes away. Does not take NSAIDs.  Diet: prepares mostly own food.   Exercise: Daily, coaches volleyball.   Mammogram UTD. PAP UTD with Dr. Hawkins   Deferred flu vaccine.     Constipation: has seen Dr. Cuevas, last colonoscopy age 43. For years, takes laxative and stool softener. Hesitant about linzess. Is starting to take OTC Magnesium.   Depression/anxiety: sees Dr. Lopez - ordered labs. On Buspar, Wellbutrin. Xanax prn.      Past Medical History:  Past Medical History:   Diagnosis Date    Abnormal Pap smear of cervix        Past Surgical History:   has a past surgical history that includes Tubal ligation;  section; Elbow surgery (Right); Hemorrhoid surgery; and Tonsillectomy (76).    Family History:  family history includes Breast cancer in her paternal aunt and paternal grandmother; Diabetes in her maternal grandmother.     Social History:  Social History     Tobacco Use    Smoking status: Never    Smokeless tobacco: Never   Substance Use Topics    Alcohol use: Yes     Alcohol/week: 4.0 standard drinks of alcohol     Types: 1 Glasses of wine, 3 Shots of liquor per week     Comment: socially    Drug use: Never       Review of Systems   Constitutional:  Negative for chills and fever.   Respiratory:  Negative for cough and shortness of breath.    Cardiovascular:  Negative for chest pain and  palpitations.   Gastrointestinal:  Positive for constipation. Negative for diarrhea, nausea and vomiting.   Genitourinary:  Negative for dysuria and hematuria.   Musculoskeletal:  Negative for falls.   Neurological:  Negative for headaches.        Medications:  Outpatient Encounter Medications as of 11/30/2023   Medication Sig Dispense Refill    ALPRAZolam (XANAX) 0.25 MG tablet Take 1 tablet (0.25 mg total) by mouth daily as needed for Anxiety (anxiety, panic attack). 10 tablet 0    buPROPion (WELLBUTRIN XL) 300 MG 24 hr tablet Take 1 tablet (300 mg total) by mouth once daily. 30 tablet 11    busPIRone (BUSPAR) 10 MG tablet Take 1 tablet (10 mg total) by mouth 2 (two) times daily. 60 tablet 11    hydrOXYzine HCL (ATARAX) 25 MG tablet Take 1 tablet (25 mg total) by mouth 3 (three) times daily as needed for Itching. 90 tablet 2    norethindrone-ethinyl estradiol (MICROGESTIN 1/20) 1-20 mg-mcg per tablet Take 1 tablet by mouth once daily. 30 tablet 11    ondansetron (ZOFRAN) 4 MG tablet Take 1 tablet (4 mg total) by mouth every 8 (eight) hours as needed for Nausea. 30 tablet 0    traZODone (DESYREL) 50 MG tablet Take 1 tablet (50 mg total) by mouth every evening. 30 tablet 11    [DISCONTINUED] hydrOXYzine HCL (ATARAX) 25 MG tablet Take 1 tablet (25 mg total) by mouth daily as needed for Anxiety (anxiety). 15 tablet 0     No facility-administered encounter medications on file as of 11/30/2023.       Allergies:  Review of patient's allergies indicates:  No Known Allergies    Health Maintenance:  Immunization History   Administered Date(s) Administered    COVID-19, MRNA, LN-S, PF (MODERNA FULL 0.5 ML DOSE) 07/22/2021, 08/23/2021      Health Maintenance   Topic Date Due    Hepatitis C Screening  Never done    TETANUS VACCINE  Never done    Colorectal Cancer Screening  Never done    Shingles Vaccine (1 of 2) Never done    Lipid Panel  04/12/2024    Mammogram  06/09/2024        Physical Exam      Vital Signs  Pulse:  "76  SpO2: 98 %  BP: 124/70  BP Location: Right arm  Pain Score: 0-No pain  Height and Weight  Height: 5' 6" (167.6 cm)  Weight: 61.2 kg (134 lb 14.7 oz)  BSA (Calculated - sq m): 1.69 sq meters  BMI (Calculated): 21.8  Weight in (lb) to have BMI = 25: 154.6]    Physical Exam  Constitutional:       Appearance: She is well-developed.   HENT:      Head: Normocephalic and atraumatic.      Right Ear: Tympanic membrane normal.      Left Ear: Tympanic membrane normal.      Nose: Nose normal.      Mouth/Throat:      Mouth: Mucous membranes are moist.   Eyes:      Pupils: Pupils are equal, round, and reactive to light.   Cardiovascular:      Rate and Rhythm: Normal rate and regular rhythm.      Heart sounds: Normal heart sounds. No murmur heard.  Pulmonary:      Effort: Pulmonary effort is normal. No respiratory distress.      Breath sounds: Normal breath sounds.   Abdominal:      General: There is no distension.      Palpations: Abdomen is soft.      Tenderness: There is no abdominal tenderness. There is no guarding.   Skin:     General: Skin is warm and dry.   Neurological:      Mental Status: She is alert. Mental status is at baseline.   Psychiatric:         Behavior: Behavior normal.          Laboratory:  CBC:  Recent Labs   Lab 12/20/22  1407 11/20/23  1019   WBC 7.81 6.05   RBC 4.63 4.79   Hemoglobin 12.5 11.0 L   Hematocrit 40.0 36.6 L   Platelets 309 409   MCV 86 76 L   MCH 27.0 23.0 L   MCHC 31.3 L 30.1 L     CMP:  Recent Labs   Lab 12/20/22  1407 11/20/23  1019   Glucose 101 77   Calcium 9.6 9.6   Albumin 4.5 4.3   Total Protein 7.8 7.8   Sodium 139 135 L   Potassium 4.0 4.7   CO2 25 21 L   Chloride 105 104   BUN 12 11   Alkaline Phosphatase 57 37 L   ALT 11 17   AST 20 25   Total Bilirubin 0.4 0.6     URINALYSIS:       LIPIDS:  Recent Labs   Lab 12/20/22  1407 11/20/23  1019   TSH 1.322 1.189     TSH:  Recent Labs   Lab 12/20/22  1407 11/20/23  1019   TSH 1.322 1.189     A1C:        Assessment/Plan     Mary Kate " Rinku Lima is a 48 y.o.female with:    1. Annual physical exam  - HIV 1/2 Ag/Ab (4th Gen); Future  - Hepatitis C Antibody; Future    2. Depression, major, recurrent, mild  Stable. Continue current meds.  Continue follow up with specialists.     3. Anemia, unspecified type  - Iron and TIBC; Future  - Ferritin; Future  - Occult blood x 1, stool; Future  - LIPID PANEL; Future  Will follow up with Dr. Junior     4. Constipation, unspecified constipation type   Discussed Linzess, will follow up with Dr. Junior    5. Palpitations   EKG in office   Discussed cardiology referral if persistent       Chronic conditions status updated as per HPI.  Other than changes above, cont current medications and maintain follow up with specialists.  Follow up in about 1 year (around 11/30/2024).    No future appointments.    Naresh Santiago, FNP [facility]

## 2023-12-01 ENCOUNTER — LAB VISIT (OUTPATIENT)
Dept: LAB | Facility: HOSPITAL | Age: 48
End: 2023-12-01
Attending: NURSE PRACTITIONER
Payer: COMMERCIAL

## 2023-12-01 DIAGNOSIS — Z00.00 ANNUAL PHYSICAL EXAM: ICD-10-CM

## 2023-12-01 DIAGNOSIS — D64.9 ANEMIA, UNSPECIFIED TYPE: ICD-10-CM

## 2023-12-01 LAB
CHOLEST SERPL-MCNC: 190 MG/DL (ref 120–199)
CHOLEST/HDLC SERPL: 2.4 {RATIO} (ref 2–5)
FERRITIN SERPL-MCNC: 5 NG/ML (ref 20–300)
HCV AB SERPL QL IA: NORMAL
HDLC SERPL-MCNC: 78 MG/DL (ref 40–75)
HDLC SERPL: 41.1 % (ref 20–50)
HIV 1+2 AB+HIV1 P24 AG SERPL QL IA: NORMAL
IRON SERPL-MCNC: 25 UG/DL (ref 30–160)
LDLC SERPL CALC-MCNC: 98.4 MG/DL (ref 63–159)
NONHDLC SERPL-MCNC: 112 MG/DL
SATURATED IRON: 4 % (ref 20–50)
TOTAL IRON BINDING CAPACITY: 598 UG/DL (ref 250–450)
TRANSFERRIN SERPL-MCNC: 404 MG/DL (ref 200–375)
TRIGL SERPL-MCNC: 68 MG/DL (ref 30–150)

## 2023-12-01 PROCEDURE — 36415 COLL VENOUS BLD VENIPUNCTURE: CPT | Performed by: NURSE PRACTITIONER

## 2023-12-01 PROCEDURE — 80061 LIPID PANEL: CPT | Performed by: NURSE PRACTITIONER

## 2023-12-01 PROCEDURE — 87389 HIV-1 AG W/HIV-1&-2 AB AG IA: CPT | Performed by: NURSE PRACTITIONER

## 2023-12-01 PROCEDURE — 84466 ASSAY OF TRANSFERRIN: CPT | Performed by: NURSE PRACTITIONER

## 2023-12-01 PROCEDURE — 86803 HEPATITIS C AB TEST: CPT | Performed by: NURSE PRACTITIONER

## 2023-12-01 PROCEDURE — 83540 ASSAY OF IRON: CPT | Performed by: NURSE PRACTITIONER

## 2023-12-01 PROCEDURE — 82728 ASSAY OF FERRITIN: CPT | Performed by: NURSE PRACTITIONER

## 2023-12-04 DIAGNOSIS — R00.2 PALPITATIONS: ICD-10-CM

## 2023-12-04 DIAGNOSIS — D50.9 IRON DEFICIENCY ANEMIA, UNSPECIFIED IRON DEFICIENCY ANEMIA TYPE: Primary | ICD-10-CM

## 2023-12-05 DIAGNOSIS — D50.9 IRON DEFICIENCY ANEMIA, UNSPECIFIED IRON DEFICIENCY ANEMIA TYPE: Primary | ICD-10-CM

## 2023-12-05 DIAGNOSIS — R00.2 PALPITATIONS: Primary | ICD-10-CM

## 2023-12-07 ENCOUNTER — PATIENT MESSAGE (OUTPATIENT)
Dept: PRIMARY CARE CLINIC | Facility: CLINIC | Age: 48
End: 2023-12-07
Payer: COMMERCIAL

## 2023-12-18 ENCOUNTER — OFFICE VISIT (OUTPATIENT)
Dept: HEMATOLOGY/ONCOLOGY | Facility: CLINIC | Age: 48
End: 2023-12-18
Payer: COMMERCIAL

## 2023-12-18 ENCOUNTER — OFFICE VISIT (OUTPATIENT)
Dept: CARDIOLOGY | Facility: CLINIC | Age: 48
End: 2023-12-18
Payer: COMMERCIAL

## 2023-12-18 VITALS
DIASTOLIC BLOOD PRESSURE: 72 MMHG | HEART RATE: 67 BPM | BODY MASS INDEX: 21.81 KG/M2 | WEIGHT: 135.13 LBS | RESPIRATION RATE: 18 BRPM | SYSTOLIC BLOOD PRESSURE: 115 MMHG

## 2023-12-18 DIAGNOSIS — D50.9 IRON DEFICIENCY ANEMIA, UNSPECIFIED IRON DEFICIENCY ANEMIA TYPE: ICD-10-CM

## 2023-12-18 DIAGNOSIS — F41.9 ANXIETY: ICD-10-CM

## 2023-12-18 DIAGNOSIS — R00.2 PALPITATIONS: Primary | ICD-10-CM

## 2023-12-18 PROCEDURE — 1159F MED LIST DOCD IN RCRD: CPT | Mod: CPTII,S$GLB,, | Performed by: INTERNAL MEDICINE

## 2023-12-18 PROCEDURE — 3008F PR BODY MASS INDEX (BMI) DOCUMENTED: ICD-10-PCS | Mod: CPTII,S$GLB,, | Performed by: INTERNAL MEDICINE

## 2023-12-18 PROCEDURE — 1160F PR REVIEW ALL MEDS BY PRESCRIBER/CLIN PHARMACIST DOCUMENTED: ICD-10-PCS | Mod: CPTII,S$GLB,, | Performed by: INTERNAL MEDICINE

## 2023-12-18 PROCEDURE — 99999 PR PBB SHADOW E&M-EST. PATIENT-LVL IV: CPT | Mod: PBBFAC,,, | Performed by: INTERNAL MEDICINE

## 2023-12-18 PROCEDURE — 99999 PR PBB SHADOW E&M-EST. PATIENT-LVL IV: ICD-10-PCS | Mod: PBBFAC,,, | Performed by: INTERNAL MEDICINE

## 2023-12-18 PROCEDURE — 99204 OFFICE O/P NEW MOD 45 MIN: CPT | Mod: S$GLB,,, | Performed by: INTERNAL MEDICINE

## 2023-12-18 PROCEDURE — 1159F PR MEDICATION LIST DOCUMENTED IN MEDICAL RECORD: ICD-10-PCS | Mod: CPTII,S$GLB,, | Performed by: INTERNAL MEDICINE

## 2023-12-18 PROCEDURE — 3078F DIAST BP <80 MM HG: CPT | Mod: CPTII,S$GLB,, | Performed by: INTERNAL MEDICINE

## 2023-12-18 PROCEDURE — 99204 OFFICE O/P NEW MOD 45 MIN: CPT | Mod: 95,,, | Performed by: STUDENT IN AN ORGANIZED HEALTH CARE EDUCATION/TRAINING PROGRAM

## 2023-12-18 PROCEDURE — 3074F SYST BP LT 130 MM HG: CPT | Mod: CPTII,S$GLB,, | Performed by: INTERNAL MEDICINE

## 2023-12-18 PROCEDURE — 3074F PR MOST RECENT SYSTOLIC BLOOD PRESSURE < 130 MM HG: ICD-10-PCS | Mod: CPTII,S$GLB,, | Performed by: INTERNAL MEDICINE

## 2023-12-18 PROCEDURE — 99204 PR OFFICE/OUTPT VISIT, NEW, LEVL IV, 45-59 MIN: ICD-10-PCS | Mod: 95,,, | Performed by: STUDENT IN AN ORGANIZED HEALTH CARE EDUCATION/TRAINING PROGRAM

## 2023-12-18 PROCEDURE — 3078F PR MOST RECENT DIASTOLIC BLOOD PRESSURE < 80 MM HG: ICD-10-PCS | Mod: CPTII,S$GLB,, | Performed by: INTERNAL MEDICINE

## 2023-12-18 PROCEDURE — 1160F RVW MEDS BY RX/DR IN RCRD: CPT | Mod: CPTII,S$GLB,, | Performed by: INTERNAL MEDICINE

## 2023-12-18 PROCEDURE — 99204 PR OFFICE/OUTPT VISIT, NEW, LEVL IV, 45-59 MIN: ICD-10-PCS | Mod: S$GLB,,, | Performed by: INTERNAL MEDICINE

## 2023-12-18 PROCEDURE — 3008F BODY MASS INDEX DOCD: CPT | Mod: CPTII,S$GLB,, | Performed by: INTERNAL MEDICINE

## 2023-12-18 NOTE — PROGRESS NOTES
HISTORY:    48-year-old female with no significant cardiovascular history presenting for initial evaluation by me.    Patient comes into discuss an intermittent strong heart beats and heaviness. Occurs 3-4x/week and lasts minutes at a time. Has been present for years. Had an episode last week that lasted an hour. No SOB. Pt does wonder if symptoms are related to anxiety. No exertional component. When symptoms occur she will check her heart rate and it is always in a normal range.    Excellent activity levels. Works as a /. Also, works out 90 minutes/day without issue. 60 minutes of heavy lifting and 20-30 minutes of walking.     The patient denies any previous history of myocardial infarction, coronary artery disease, peripheral arterial disease, stroke, congestive heart failure, or cardiomyopathy.      PHYSICAL EXAM:    Vitals:    12/18/23 0858   BP: 115/72   Pulse: 67   Resp: 18       NAD, A+Ox3.  No jvd, no bruit.  RRR nml s1,s2. No murmurs.  CTA B no wheezes or crackles.  No edema.    LABS/STUDIES (imaging reviewed during clinic visit):    November 2023 hemoglobin 11.0 with MCV of 76.  Platelets 409.  Iron 25 with a TIBC of 598.  Ferritin 5.  CMP normal.  LDL 98 and HDL 78.  Triglycerides 68.  TSH normal.  ECG November 2023 sinus rhythm with no Q-waves or ST changes.  Pac.    ASSESSMENT & PLAN:    1. Palpitations    2. Iron deficiency anemia, unspecified iron deficiency anemia type    3. Anxiety              Patient noting strong heart beats at times. Chronic. Likely related to anxiety. Excellent exercise capacity without symptoms. Pt prefers expectant management at this time, which is reasonable.     Patient w c/o of Raynaud's type symptoms recently. Newly recognized CATALINA can be a contributor. Has heme eval coming up. Monitor for now. Can consider ca channel blocker in the future if symptoms continue to be an issue long term.       Follow up if symptoms worsen or fail to  improve.      Selina Levine MD

## 2023-12-18 NOTE — PROGRESS NOTES
NEW ONCOLOGY VISIT - ESTABLISH CARE    Subjective:      Patient ID: Mary Kate Lima    Chief Complaint: Iron Deficiency Anemia    HPI  Mary Kate Lima is a 48 y.o. female, referred by Daniella Santiago NP, to clinic for evaluation and management of Iron Deficiency Anemia. She has a PMH of Depression, Menorrhagia, and Anxiety. She presented to her PCP for routine follow-up but endorsed worsening fatigue, menorrhagia, headaches, and restless legs at night. Of note, she has a history of menorrhagia and typically has a 5 day heavy cycle and has recently been placed back on OCP's with mild improvement in her symptoms. She denies ever being told previously she was anemic or receiving any iron supplementation.       Past medical history: Depression, Menorrhagia, and Anxiety.  Past surgical history:   times 3, Elbow Surgery  Allergies: NKA  Social history: Denies tobacco, illicit drug use, or significant ETOH use,  and   Hospitalizations: N/A  Family history: N/A  Medications: N/A    ECOG Performance status: 1 - Symptomatic but completely ambulatory    Cancer Staging   No matching staging information was found for the patient.    Oncologic History:  Oncology History    No history exists.       Review of Systems   Constitutional:  Positive for appetite change and fatigue.   Neurological:  Positive for weakness.       Allergies:  Review of patient's allergies indicates:  No Known Allergies    Medications:  Current Outpatient Medications   Medication Sig Dispense Refill    ALPRAZolam (XANAX) 0.25 MG tablet Take 1 tablet (0.25 mg total) by mouth daily as needed for Anxiety (anxiety, panic attack). 10 tablet 0    buPROPion (WELLBUTRIN XL) 300 MG 24 hr tablet Take 1 tablet (300 mg total) by mouth once daily. 30 tablet 11    busPIRone (BUSPAR) 10 MG tablet Take 1 tablet (10 mg total) by mouth 2 (two) times daily. 60 tablet 11    hydrOXYzine HCL (ATARAX) 25 MG tablet Take 1 tablet  (25 mg total) by mouth 3 (three) times daily as needed for Itching. 90 tablet 2    norethindrone-ethinyl estradiol (MICROGESTIN ) 1-20 mg-mcg per tablet Take 1 tablet by mouth once daily. 30 tablet 11    ondansetron (ZOFRAN) 4 MG tablet Take 1 tablet (4 mg total) by mouth every 8 (eight) hours as needed for Nausea. 30 tablet 0    traZODone (DESYREL) 50 MG tablet Take 1 tablet (50 mg total) by mouth every evening. 30 tablet 11     No current facility-administered medications for this visit.       PMH:  Past Medical History:   Diagnosis Date    Abnormal Pap smear of cervix        PSH:  Past Surgical History:   Procedure Laterality Date     SECTION      x3    ELBOW SURGERY Right     HEMORRHOID SURGERY      TONSILLECTOMY  76    TUBAL LIGATION         FamHx:  Family History   Problem Relation Age of Onset    Breast cancer Paternal Grandmother     Breast cancer Paternal Aunt     Diabetes Maternal Grandmother     Colon cancer Neg Hx     Ovarian cancer Neg Hx        SocHx:  Social History     Socioeconomic History    Marital status:    Tobacco Use    Smoking status: Never    Smokeless tobacco: Never   Substance and Sexual Activity    Alcohol use: Yes     Alcohol/week: 4.0 standard drinks of alcohol     Types: 1 Glasses of wine, 3 Shots of liquor per week     Comment: socially    Drug use: Never    Sexual activity: Yes     Partners: Male     Birth control/protection: See Surgical Hx, Other-see comments     Comment: Tubal ligation     Social Determinants of Health     Financial Resource Strain: Low Risk  (2023)    Overall Financial Resource Strain (CARDIA)     Difficulty of Paying Living Expenses: Not hard at all   Food Insecurity: No Food Insecurity (2023)    Hunger Vital Sign     Worried About Running Out of Food in the Last Year: Never true     Ran Out of Food in the Last Year: Never true   Transportation Needs: No Transportation Needs (2023)    PRAPARE -  Transportation     Lack of Transportation (Medical): No     Lack of Transportation (Non-Medical): No   Physical Activity: Sufficiently Active (12/18/2023)    Exercise Vital Sign     Days of Exercise per Week: 6 days     Minutes of Exercise per Session: 70 min   Stress: Stress Concern Present (12/18/2023)    [employer] [place] - Occupational Stress Questionnaire     Feeling of Stress : Very much   Social Connections: Unknown (12/18/2023)    Social Connection and Isolation Panel [NHANES]     Frequency of Communication with Friends and Family: More than three times a week     Frequency of Social Gatherings with Friends and Family: Twice a week     Active Member of Clubs or Organizations: No     Attends Club or Organization Meetings: Patient declined     Marital Status:    Housing Stability: Low Risk  (12/18/2023)    Housing Stability Vital Sign     Unable to Pay for Housing in the Last Year: No     Number of Places Lived in the Last Year: 1     Unstable Housing in the Last Year: No       Distress Score             Objective:      There were no vitals filed for this visit.  Physical Exam  Constitutional:       Appearance: Normal appearance.   Neurological:      Mental Status: She is alert.   Psychiatric:         Behavior: Behavior normal.         LABS:  WBC   Date Value Ref Range Status   11/20/2023 6.05 3.90 - 12.70 K/uL Final     Hemoglobin   Date Value Ref Range Status   11/20/2023 11.0 (L) 12.0 - 16.0 g/dL Final     Hematocrit   Date Value Ref Range Status   11/20/2023 36.6 (L) 37.0 - 48.5 % Final     Platelets   Date Value Ref Range Status   11/20/2023 409 150 - 450 K/uL Final       Chemistry        Component Value Date/Time     (L) 11/20/2023 1019    K 4.7 11/20/2023 1019     11/20/2023 1019    CO2 21 (L) 11/20/2023 1019    BUN 11 11/20/2023 1019    CREATININE 0.8 11/20/2023 1019    GLU 77 11/20/2023 1019        Component Value Date/Time    CALCIUM 9.6  11/20/2023 1019    ALKPHOS 37 (L) 11/20/2023 1019    AST 25 11/20/2023 1019    ALT 17 11/20/2023 1019    BILITOT 0.6 11/20/2023 1019              Assessment:       1. Iron deficiency anemia, unspecified iron deficiency anemia type          Plan:         Iron Deficiency Anemia Secondary to Menorrhagia  Patient with long-history of menorrhagia with recently placed on OCP with mild improvement  Transferrin saturation of 4%, consistent with CATALINA  No prior history of CATALINA or needing iron supplementation  Plan:  -Plan placed for Injectafer 2 doses  -Repeat labs in 3 months  -Educated to follow-up OB/GYN for menorrhagia and GI for colonoscopy  -Educated to notify us of any questions or concerns  -Follow-up in 3 months    Route Chart for Scheduling    BMT Chart Routing      Follow up with physician 3 months. Dr. Jane in 3 months   Follow up with [facility]    Provider visit type    Infusion scheduling note    Injection scheduling note Please schedule injectafer 2 dose series   Labs CMP, CBC, iron and TIBC, ferritin and urinalysis   Scheduling:  Preferred lab:  Lab interval:  Labs in 3 months prior to visit   Imaging    Pharmacy appointment    Other referrals              Supportive Plan Information  OP FERRIC CARBOXYMALTOSE Q2W   Jaydon Jane MD   Upcoming Treatment Dates - OP FERRIC CARBOXYMALTOSE Q2W    12/25/2023       Supportive Care       ferric carboxymaltose (INJECTAFER) 750 mg in sodium chloride 0.9% 265 mL IVPB (ready to mix)  1/1/2024       Supportive Care       ferric carboxymaltose (INJECTAFER) 750 mg in sodium chloride 0.9% 265 mL IVPB (ready to mix)            Shane Longo D.O.  Hematology/Oncology Fellow, PGY-V

## 2024-01-04 ENCOUNTER — PATIENT MESSAGE (OUTPATIENT)
Dept: HEMATOLOGY/ONCOLOGY | Facility: CLINIC | Age: 49
End: 2024-01-04
Payer: COMMERCIAL

## 2024-01-12 ENCOUNTER — INFUSION (OUTPATIENT)
Dept: INFUSION THERAPY | Facility: HOSPITAL | Age: 49
End: 2024-01-12
Payer: COMMERCIAL

## 2024-01-12 VITALS
WEIGHT: 132 LBS | SYSTOLIC BLOOD PRESSURE: 138 MMHG | TEMPERATURE: 98 F | HEART RATE: 81 BPM | RESPIRATION RATE: 18 BRPM | DIASTOLIC BLOOD PRESSURE: 74 MMHG | BODY MASS INDEX: 21.31 KG/M2

## 2024-01-12 DIAGNOSIS — D50.0 IRON DEFICIENCY ANEMIA DUE TO CHRONIC BLOOD LOSS: Primary | ICD-10-CM

## 2024-01-12 PROCEDURE — 25000003 PHARM REV CODE 250: Performed by: STUDENT IN AN ORGANIZED HEALTH CARE EDUCATION/TRAINING PROGRAM

## 2024-01-12 PROCEDURE — 96374 THER/PROPH/DIAG INJ IV PUSH: CPT

## 2024-01-12 PROCEDURE — 63600175 PHARM REV CODE 636 W HCPCS: Mod: JZ,JG | Performed by: STUDENT IN AN ORGANIZED HEALTH CARE EDUCATION/TRAINING PROGRAM

## 2024-01-12 RX ORDER — EPINEPHRINE 0.3 MG/.3ML
0.3 INJECTION SUBCUTANEOUS ONCE AS NEEDED
Status: DISCONTINUED | OUTPATIENT
Start: 2024-01-12 | End: 2024-01-12 | Stop reason: HOSPADM

## 2024-01-12 RX ORDER — DIPHENHYDRAMINE HYDROCHLORIDE 50 MG/ML
50 INJECTION, SOLUTION INTRAMUSCULAR; INTRAVENOUS ONCE AS NEEDED
Status: DISCONTINUED | OUTPATIENT
Start: 2024-01-12 | End: 2024-01-12 | Stop reason: HOSPADM

## 2024-01-12 RX ORDER — SODIUM CHLORIDE 0.9 % (FLUSH) 0.9 %
10 SYRINGE (ML) INJECTION
Status: DISCONTINUED | OUTPATIENT
Start: 2024-01-12 | End: 2024-01-12 | Stop reason: HOSPADM

## 2024-01-12 RX ORDER — HEPARIN 100 UNIT/ML
500 SYRINGE INTRAVENOUS
Status: DISCONTINUED | OUTPATIENT
Start: 2024-01-12 | End: 2024-01-12 | Stop reason: HOSPADM

## 2024-01-12 RX ADMIN — FERRIC CARBOXYMALTOSE INJECTION 750 MG: 50 INJECTION, SOLUTION INTRAVENOUS at 02:01

## 2024-01-12 RX ADMIN — SODIUM CHLORIDE: 9 INJECTION, SOLUTION INTRAVENOUS at 02:01

## 2024-01-12 NOTE — PLAN OF CARE
1415 pt here for Injectafer infusion #1,  labs, hx, meds, allergies reviewed, pt with complaints of feeling sob with exertion and more tired than usual, pt reclined in chair, continue to monitor

## 2024-01-12 NOTE — PLAN OF CARE
1520 pt tolerated Injectafer infusion without issue, pt to rtc 1/19/24, no distress noted upon d/c to home

## 2024-01-19 ENCOUNTER — INFUSION (OUTPATIENT)
Dept: INFUSION THERAPY | Facility: HOSPITAL | Age: 49
End: 2024-01-19
Payer: COMMERCIAL

## 2024-01-19 VITALS
RESPIRATION RATE: 18 BRPM | WEIGHT: 132.25 LBS | HEART RATE: 68 BPM | SYSTOLIC BLOOD PRESSURE: 121 MMHG | BODY MASS INDEX: 21.25 KG/M2 | HEIGHT: 66 IN | TEMPERATURE: 98 F | DIASTOLIC BLOOD PRESSURE: 75 MMHG

## 2024-01-19 DIAGNOSIS — D50.0 IRON DEFICIENCY ANEMIA DUE TO CHRONIC BLOOD LOSS: Primary | ICD-10-CM

## 2024-01-19 PROCEDURE — 25000003 PHARM REV CODE 250: Performed by: INTERNAL MEDICINE

## 2024-01-19 PROCEDURE — 96365 THER/PROPH/DIAG IV INF INIT: CPT

## 2024-01-19 PROCEDURE — 63600175 PHARM REV CODE 636 W HCPCS: Mod: JZ,JG | Performed by: INTERNAL MEDICINE

## 2024-01-19 RX ORDER — DIPHENHYDRAMINE HYDROCHLORIDE 50 MG/ML
50 INJECTION INTRAMUSCULAR; INTRAVENOUS ONCE AS NEEDED
Status: CANCELLED | OUTPATIENT
Start: 2024-01-19

## 2024-01-19 RX ORDER — EPINEPHRINE 0.3 MG/.3ML
0.3 INJECTION SUBCUTANEOUS ONCE AS NEEDED
Status: CANCELLED | OUTPATIENT
Start: 2024-01-19

## 2024-01-19 RX ORDER — HEPARIN 100 UNIT/ML
500 SYRINGE INTRAVENOUS
Status: DISCONTINUED | OUTPATIENT
Start: 2024-01-19 | End: 2024-01-19 | Stop reason: HOSPADM

## 2024-01-19 RX ORDER — DIPHENHYDRAMINE HYDROCHLORIDE 50 MG/ML
50 INJECTION INTRAMUSCULAR; INTRAVENOUS ONCE AS NEEDED
Status: DISCONTINUED | OUTPATIENT
Start: 2024-01-19 | End: 2024-01-19 | Stop reason: HOSPADM

## 2024-01-19 RX ORDER — SODIUM CHLORIDE 0.9 % (FLUSH) 0.9 %
10 SYRINGE (ML) INJECTION
Status: DISCONTINUED | OUTPATIENT
Start: 2024-01-19 | End: 2024-01-19 | Stop reason: HOSPADM

## 2024-01-19 RX ORDER — EPINEPHRINE 0.3 MG/.3ML
0.3 INJECTION SUBCUTANEOUS ONCE AS NEEDED
Status: DISCONTINUED | OUTPATIENT
Start: 2024-01-19 | End: 2024-01-19 | Stop reason: HOSPADM

## 2024-01-19 RX ORDER — HEPARIN 100 UNIT/ML
500 SYRINGE INTRAVENOUS
Status: CANCELLED | OUTPATIENT
Start: 2024-01-19

## 2024-01-19 RX ORDER — SODIUM CHLORIDE 0.9 % (FLUSH) 0.9 %
10 SYRINGE (ML) INJECTION
Status: CANCELLED | OUTPATIENT
Start: 2024-01-19

## 2024-01-19 RX ADMIN — SODIUM CHLORIDE: 9 INJECTION, SOLUTION INTRAVENOUS at 02:01

## 2024-01-19 RX ADMIN — FERRIC CARBOXYMALTOSE INJECTION 750 MG: 50 INJECTION, SOLUTION INTRAVENOUS at 02:01

## 2024-02-01 ENCOUNTER — PATIENT MESSAGE (OUTPATIENT)
Dept: OBSTETRICS AND GYNECOLOGY | Facility: CLINIC | Age: 49
End: 2024-02-01
Payer: COMMERCIAL

## 2024-02-01 ENCOUNTER — OFFICE VISIT (OUTPATIENT)
Dept: OBSTETRICS AND GYNECOLOGY | Facility: CLINIC | Age: 49
End: 2024-02-01
Payer: COMMERCIAL

## 2024-02-01 VITALS
SYSTOLIC BLOOD PRESSURE: 120 MMHG | DIASTOLIC BLOOD PRESSURE: 70 MMHG | HEIGHT: 66 IN | BODY MASS INDEX: 21.4 KG/M2 | WEIGHT: 133.19 LBS

## 2024-02-01 DIAGNOSIS — N93.9 ABNORMAL UTERINE BLEEDING (AUB): ICD-10-CM

## 2024-02-01 DIAGNOSIS — B37.31 YEAST VAGINITIS: ICD-10-CM

## 2024-02-01 DIAGNOSIS — N89.8 VAGINAL DISCHARGE: ICD-10-CM

## 2024-02-01 DIAGNOSIS — N89.8 VAGINAL IRRITATION: Primary | ICD-10-CM

## 2024-02-01 DIAGNOSIS — N89.8 VAGINAL DRYNESS: ICD-10-CM

## 2024-02-01 DIAGNOSIS — R68.82 LOW LIBIDO: ICD-10-CM

## 2024-02-01 DIAGNOSIS — Z86.018 HISTORY OF UTERINE FIBROID: ICD-10-CM

## 2024-02-01 DIAGNOSIS — N94.10 DYSPAREUNIA IN FEMALE: ICD-10-CM

## 2024-02-01 DIAGNOSIS — N93.9 ABNORMAL UTERINE BLEEDING (AUB): Primary | ICD-10-CM

## 2024-02-01 PROCEDURE — 3074F SYST BP LT 130 MM HG: CPT | Mod: CPTII,S$GLB,,

## 2024-02-01 PROCEDURE — 3008F BODY MASS INDEX DOCD: CPT | Mod: CPTII,S$GLB,,

## 2024-02-01 PROCEDURE — 99999 PR PBB SHADOW E&M-EST. PATIENT-LVL III: CPT | Mod: PBBFAC,,,

## 2024-02-01 PROCEDURE — 1159F MED LIST DOCD IN RCRD: CPT | Mod: CPTII,S$GLB,,

## 2024-02-01 PROCEDURE — 81514 NFCT DS BV&VAGINITIS DNA ALG: CPT

## 2024-02-01 PROCEDURE — 99214 OFFICE O/P EST MOD 30 MIN: CPT | Mod: S$GLB,,,

## 2024-02-01 PROCEDURE — 3078F DIAST BP <80 MM HG: CPT | Mod: CPTII,S$GLB,,

## 2024-02-01 RX ORDER — ESTRADIOL 0.1 MG/G
CREAM VAGINAL
Qty: 42.5 G | Refills: 3 | Status: SHIPPED | OUTPATIENT
Start: 2024-02-01 | End: 2024-03-18

## 2024-02-01 NOTE — PROGRESS NOTES
"  Chief Complaint: Vaginal Pruritis     HPI:      Mary Kate Lima is a 48 y.o.  who presents with complaint of vaginal pruritis.  Reports symptoms have been present for the last two months but worsening in the last two weeks.  She reports itching, denies odor.  She states the discharge is white but denies it being specifically thick or clumpy.    Additionally with concerns of abnormal bleeding. Pt reports her last menstrual period was two weeks ago but she started bleeding again this am. Reports bleeding this morning was heavy and she was passing large blood clots. Now bleeding is better and leveling off. Has a hx of AUB.  Also with iron deficiency anemia requiring IV iron infusions.  Seen in 2023 with ultrasound showing enlarged uterus and two fibroids.  Started on OCPs for cycle management at the time, while waiting for myomectomy surgery.  Had surgery scheduled for summertime but canceled the surgery.  Reports since OCP's cycles have been better until recently.  She has visit with Dr. Smith scheduled in March to discuss rescheduling myomectomy and ablation procedure.    She is currently sexually active with a single male partner.  She reports poor sex drive, vaginal dryness, and dyspareunia. Using BTL for contraception.     ROS:   GENERAL: Denies weight gain or weight loss. Feeling well overall.   SKIN: Denies rash or lesions.   ABDOMEN: Denies abdominal pain, constipation, diarrhea, nausea, vomiting, change in appetite or rectal bleeding.   URINARY: Denies frequency, dysuria, hematuria.  GYN: See HPI.    Physical Exam:      PHYSICAL EXAM:   Vitals:    24 1308   BP: 120/70   Weight: 60.4 kg (133 lb 2.5 oz)   Height: 5' 6" (1.676 m)   PainSc: 0-No pain     Body mass index is 21.49 kg/m².    General: No acute distress, alert and engaged  VULVA: + dryness noted to vulva with no masses no lesions   VAGINA: smooth vaginal canal with normal color. bloody discharge, no lesions   CERVIX: " normal appearing cervix with bloody discharge, no lesions (current menstruation)  UTERUS: uterus is normal size, shape, consistency and nontender   ADNEXA: normal adnexa in size, nontender and no masses    Assessment/Plan:     Vaginal irritation  -     Vaginosis Screen by DNA Probe    Vaginal dryness  -     estradioL (ESTRACE) 0.01 % (0.1 mg/gram) vaginal cream; Place 1g nightly for first week of use and then twice weekly at night thereafter (ex: Monday/Thursday or Tuesday/Friday).  Dispense: 42.5 g; Refill: 3    Low libido  -     CMPD testosterone proprionate 2% in vanicream; Apply one click daily to inner thigh or labia nightly  Dispense: 60 g; Refill: 5    History of uterine fibroid    Vaginal discharge  -     Vaginosis Screen by DNA Probe  -     Discussed prevention of vaginosis with feminine probiotics and boric acid suppositories.    Dyspareunia in female  -     estradioL (ESTRACE) 0.01 % (0.1 mg/gram) vaginal cream; Place 1g nightly for first week of use and then twice weekly at night thereafter (ex: Monday/Thursday or Tuesday/Friday).  Dispense: 42.5 g; Refill: 3    Abnormal uterine bleeding (AUB)  -     Have patient follow up for current appointment with  regarding her fibroids/surgery.    Patient was counseled today on vaginitis prevention including :  a. avoiding feminine products such as deoderant soaps, body wash, bubble bath, douches, scented toilet paper, deoderant tampons or pads, feminine wipes, chronic pad use, etc.  b. avoiding other vulvovaginal irritants such as long hot baths, humidity, tight, synthetic clothing, chlorine and sitting around in wet bathing suits  c. wearing cotton underwear, avoiding thong underwear and no underwear to bed  d. taking showers instead of baths and use a hair dryer on cool setting afterwards to dry  e. wearing cotton to exercise and shower immediately after exercise and change clothes  f. using polyurethane condoms without spermicide if sexually active and  symptoms are triggered by intercourse    Use of MyChart and Patient Portal recommended to patient today.    FOLLOW UP: PRN lack of improvement.    Cindy Pompa, Student FNP, AGACNP    Exam without overt signs of vaginitis today however pt currently bleeding.  Affirm collected to r/o infection.  Will follow up results and treat accordingly.  Dryness on exam and pt w/ c/o dyspareunia.  Estrace cream discussed.  Pt would like to trial.  Also with low libido.  Pt started OTC supplements on Monday.  Option of testosterone cream given to patient.  Side effects reviewed.  Pt in agreement with trial - rx sent to pharmacy.  Discussed hx of heavy bleeding.  Pt worried heavy bleeding could continue like this am.  Current bleeding leveling off per pt and no significant bleeding on exam.  Discussed if heavy bleeding reoccurs to let me know.  Has appointment scheduled with Dr. Smith in March to discuss options.    I have seen the patient, reviewed the nurse practitioner student's assessment and plan of care. I have personally interviewed and examined the patient at bedside and: agree with the findings.       Gretel Iglesias (Maggie), LASHANDA  Obstetrics and Gynecology  [facility]

## 2024-02-02 ENCOUNTER — TELEPHONE (OUTPATIENT)
Dept: OBSTETRICS AND GYNECOLOGY | Facility: CLINIC | Age: 49
End: 2024-02-02

## 2024-02-02 LAB
BACTERIAL VAGINOSIS DNA: NEGATIVE
CANDIDA GLABRATA DNA: NEGATIVE
CANDIDA KRUSEI DNA: NEGATIVE
CANDIDA RRNA VAG QL PROBE: POSITIVE
T VAGINALIS RRNA GENITAL QL PROBE: NEGATIVE

## 2024-02-02 RX ORDER — NORETHINDRONE ACETATE AND ETHINYL ESTRADIOL .03; 1.5 MG/1; MG/1
1 TABLET ORAL DAILY
Qty: 28 EACH | Refills: 11 | Status: SHIPPED | OUTPATIENT
Start: 2024-02-02 | End: 2024-03-15

## 2024-02-02 RX ORDER — FLUCONAZOLE 150 MG/1
150 TABLET ORAL
Qty: 2 TABLET | Refills: 0 | Status: SHIPPED | OUTPATIENT
Start: 2024-02-02 | End: 2024-03-18

## 2024-02-02 RX ORDER — MEDROXYPROGESTERONE ACETATE 10 MG/1
20 TABLET ORAL 3 TIMES DAILY
Qty: 60 TABLET | Refills: 0 | Status: SHIPPED | OUTPATIENT
Start: 2024-02-02 | End: 2024-03-15

## 2024-02-02 NOTE — TELEPHONE ENCOUNTER
Contact made with patient.  Pt reports saturating tampons every 30 min -1 hour this morning.  Endorses passing large blood clots.  Bleeding leveling off right now but worried it will return.  Denies SOB, dizziness, syncope, chest pain, heart palpations.  Rx for Provera to stop bleeding. Birth control changed from Microgestin to Loestrin 1.5/30 mg-mcg.  Also discussed swab done in clinic positive for yeast. Will treat with Diflucan.  Rx sent to pharmacy as well.    Pt leaving to go out of town at 5 pm this evening.  ED precautions given if no improvement or worsening in bleeding or development of SOB, dizziness, syncope, chest pain, heart palpations, ect.      All questions answered. Pt verbalized understanding.

## 2024-02-07 ENCOUNTER — PATIENT MESSAGE (OUTPATIENT)
Dept: OBSTETRICS AND GYNECOLOGY | Facility: CLINIC | Age: 49
End: 2024-02-07
Payer: COMMERCIAL

## 2024-02-07 ENCOUNTER — TELEPHONE (OUTPATIENT)
Dept: OBSTETRICS AND GYNECOLOGY | Facility: CLINIC | Age: 49
End: 2024-02-07
Payer: COMMERCIAL

## 2024-02-07 NOTE — TELEPHONE ENCOUNTER
Contact made with pharmacy.  Jacinda not in at time of call.  Discussed patients phone number and instructions for prescription use with Roman

## 2024-02-08 ENCOUNTER — TELEPHONE (OUTPATIENT)
Dept: OBSTETRICS AND GYNECOLOGY | Facility: CLINIC | Age: 49
End: 2024-02-08

## 2024-02-22 ENCOUNTER — TELEPHONE (OUTPATIENT)
Dept: PRIMARY CARE CLINIC | Facility: CLINIC | Age: 49
End: 2024-02-22
Payer: COMMERCIAL

## 2024-02-22 NOTE — TELEPHONE ENCOUNTER
----- Message from Ana Neri sent at 2/22/2024  8:29 AM CST -----  Contact: [facility]  [email or web address]     Patient is calling for Medical Advice regarding:sparkle is calling for pt last EKG she will be having surgery 2/28/24 next week. Please fax to her @[phone]    How long has patient had these symptoms:n/a    Pharmacy name and phone#:na    Would like response via [employer]:  #call back [phone]

## 2024-02-22 NOTE — TELEPHONE ENCOUNTER
Lawanda is aware she needs to be seen by cards, she states she just need the last EKG to compare to new one. EKG is faxed

## 2024-03-08 ENCOUNTER — PATIENT MESSAGE (OUTPATIENT)
Dept: OBSTETRICS AND GYNECOLOGY | Facility: CLINIC | Age: 49
End: 2024-03-08
Payer: COMMERCIAL

## 2024-03-08 DIAGNOSIS — N93.9 ABNORMAL UTERINE BLEEDING (AUB): ICD-10-CM

## 2024-03-15 RX ORDER — NORGESTIMATE AND ETHINYL ESTRADIOL 0.25-0.035
KIT ORAL
Qty: 90 TABLET | Refills: 1 | Status: SHIPPED | OUTPATIENT
Start: 2024-03-15 | End: 2024-03-18

## 2024-03-15 RX ORDER — NORETHINDRONE ACETATE AND ETHINYL ESTRADIOL .03; 1.5 MG/1; MG/1
TABLET ORAL
Qty: 84 EACH | Refills: 0 | Status: CANCELLED | OUTPATIENT
Start: 2024-03-15 | End: 2025-03-29

## 2024-03-15 NOTE — TELEPHONE ENCOUNTER
I pended a taper rx since this would have her run out of medication sooner, can you please sign?

## 2024-03-15 NOTE — TELEPHONE ENCOUNTER
Pt on 2nd pill pack of new OCP, currently on sugar pills and c/o heavy vaginal bleeding.  Changing ultra tampon in 45 minutes.  Due to start new pill pack on Sunday.  Requesting rx to stop bleeding.     Should she just start new pill pack today?

## 2024-03-15 NOTE — TELEPHONE ENCOUNTER
What is she actually taking?  Is it provera or an ocp.    If she is symptomatic needs to go to ED  If asymptomatic we can send over an ocp taper or high dose provera, I just need to know what she is currently on.  She also needs a follow up visit and u/s with me within the next few weeks.  If you have trouble finding a spot.  Please let me know.  Thank you

## 2024-03-18 ENCOUNTER — OFFICE VISIT (OUTPATIENT)
Dept: HEMATOLOGY/ONCOLOGY | Facility: CLINIC | Age: 49
End: 2024-03-18
Payer: COMMERCIAL

## 2024-03-18 ENCOUNTER — LAB VISIT (OUTPATIENT)
Dept: LAB | Facility: HOSPITAL | Age: 49
End: 2024-03-18
Payer: COMMERCIAL

## 2024-03-18 VITALS
BODY MASS INDEX: 22.25 KG/M2 | OXYGEN SATURATION: 99 % | TEMPERATURE: 98 F | SYSTOLIC BLOOD PRESSURE: 124 MMHG | HEIGHT: 66 IN | RESPIRATION RATE: 18 BRPM | HEART RATE: 78 BPM | DIASTOLIC BLOOD PRESSURE: 58 MMHG | WEIGHT: 138.44 LBS

## 2024-03-18 DIAGNOSIS — N93.9 ABNORMAL UTERINE BLEEDING (AUB): ICD-10-CM

## 2024-03-18 DIAGNOSIS — D50.9 IRON DEFICIENCY ANEMIA, UNSPECIFIED IRON DEFICIENCY ANEMIA TYPE: Primary | ICD-10-CM

## 2024-03-18 DIAGNOSIS — D50.9 IRON DEFICIENCY ANEMIA, UNSPECIFIED IRON DEFICIENCY ANEMIA TYPE: ICD-10-CM

## 2024-03-18 LAB
ALBUMIN SERPL BCP-MCNC: 4.1 G/DL (ref 3.5–5.2)
ALP SERPL-CCNC: 38 U/L (ref 55–135)
ALT SERPL W/O P-5'-P-CCNC: 16 U/L (ref 10–44)
ANION GAP SERPL CALC-SCNC: 8 MMOL/L (ref 8–16)
AST SERPL-CCNC: 18 U/L (ref 10–40)
BASOPHILS # BLD AUTO: 0.04 K/UL (ref 0–0.2)
BASOPHILS NFR BLD: 0.6 % (ref 0–1.9)
BILIRUB SERPL-MCNC: 0.4 MG/DL (ref 0.1–1)
BUN SERPL-MCNC: 11 MG/DL (ref 6–20)
CALCIUM SERPL-MCNC: 9.9 MG/DL (ref 8.7–10.5)
CHLORIDE SERPL-SCNC: 107 MMOL/L (ref 95–110)
CO2 SERPL-SCNC: 23 MMOL/L (ref 23–29)
CREAT SERPL-MCNC: 0.9 MG/DL (ref 0.5–1.4)
DIFFERENTIAL METHOD BLD: ABNORMAL
EOSINOPHIL # BLD AUTO: 0.1 K/UL (ref 0–0.5)
EOSINOPHIL NFR BLD: 2.1 % (ref 0–8)
ERYTHROCYTE [DISTWIDTH] IN BLOOD BY AUTOMATED COUNT: 16.3 % (ref 11.5–14.5)
EST. GFR  (NO RACE VARIABLE): >60 ML/MIN/1.73 M^2
FERRITIN SERPL-MCNC: 55 NG/ML (ref 20–300)
GLUCOSE SERPL-MCNC: 90 MG/DL (ref 70–110)
HCT VFR BLD AUTO: 38.8 % (ref 37–48.5)
HGB BLD-MCNC: 12.6 G/DL (ref 12–16)
IMM GRANULOCYTES # BLD AUTO: 0.02 K/UL (ref 0–0.04)
IMM GRANULOCYTES NFR BLD AUTO: 0.3 % (ref 0–0.5)
IRON SERPL-MCNC: 146 UG/DL (ref 30–160)
LYMPHOCYTES # BLD AUTO: 1.4 K/UL (ref 1–4.8)
LYMPHOCYTES NFR BLD: 21.6 % (ref 18–48)
MCH RBC QN AUTO: 29 PG (ref 27–31)
MCHC RBC AUTO-ENTMCNC: 32.5 G/DL (ref 32–36)
MCV RBC AUTO: 89 FL (ref 82–98)
MONOCYTES # BLD AUTO: 0.5 K/UL (ref 0.3–1)
MONOCYTES NFR BLD: 6.9 % (ref 4–15)
NEUTROPHILS # BLD AUTO: 4.6 K/UL (ref 1.8–7.7)
NEUTROPHILS NFR BLD: 68.5 % (ref 38–73)
NRBC BLD-RTO: 0 /100 WBC
PLATELET # BLD AUTO: 256 K/UL (ref 150–450)
PMV BLD AUTO: 10.7 FL (ref 9.2–12.9)
POTASSIUM SERPL-SCNC: 5.1 MMOL/L (ref 3.5–5.1)
PROT SERPL-MCNC: 7.1 G/DL (ref 6–8.4)
RBC # BLD AUTO: 4.34 M/UL (ref 4–5.4)
SATURATED IRON: 35 % (ref 20–50)
SODIUM SERPL-SCNC: 138 MMOL/L (ref 136–145)
TOTAL IRON BINDING CAPACITY: 423 UG/DL (ref 250–450)
TRANSFERRIN SERPL-MCNC: 286 MG/DL (ref 200–375)
WBC # BLD AUTO: 6.67 K/UL (ref 3.9–12.7)

## 2024-03-18 PROCEDURE — 1159F MED LIST DOCD IN RCRD: CPT | Mod: CPTII,S$GLB,, | Performed by: INTERNAL MEDICINE

## 2024-03-18 PROCEDURE — 99999 PR PBB SHADOW E&M-EST. PATIENT-LVL III: CPT | Mod: PBBFAC,,, | Performed by: INTERNAL MEDICINE

## 2024-03-18 PROCEDURE — 82728 ASSAY OF FERRITIN: CPT | Performed by: STUDENT IN AN ORGANIZED HEALTH CARE EDUCATION/TRAINING PROGRAM

## 2024-03-18 PROCEDURE — 80053 COMPREHEN METABOLIC PANEL: CPT | Performed by: STUDENT IN AN ORGANIZED HEALTH CARE EDUCATION/TRAINING PROGRAM

## 2024-03-18 PROCEDURE — 99213 OFFICE O/P EST LOW 20 MIN: CPT | Mod: S$GLB,,, | Performed by: INTERNAL MEDICINE

## 2024-03-18 PROCEDURE — 36415 COLL VENOUS BLD VENIPUNCTURE: CPT | Performed by: STUDENT IN AN ORGANIZED HEALTH CARE EDUCATION/TRAINING PROGRAM

## 2024-03-18 PROCEDURE — 3008F BODY MASS INDEX DOCD: CPT | Mod: CPTII,S$GLB,, | Performed by: INTERNAL MEDICINE

## 2024-03-18 PROCEDURE — 83540 ASSAY OF IRON: CPT | Performed by: STUDENT IN AN ORGANIZED HEALTH CARE EDUCATION/TRAINING PROGRAM

## 2024-03-18 PROCEDURE — 3078F DIAST BP <80 MM HG: CPT | Mod: CPTII,S$GLB,, | Performed by: INTERNAL MEDICINE

## 2024-03-18 PROCEDURE — 85025 COMPLETE CBC W/AUTO DIFF WBC: CPT | Performed by: STUDENT IN AN ORGANIZED HEALTH CARE EDUCATION/TRAINING PROGRAM

## 2024-03-18 PROCEDURE — 3074F SYST BP LT 130 MM HG: CPT | Mod: CPTII,S$GLB,, | Performed by: INTERNAL MEDICINE

## 2024-03-18 RX ORDER — NORETHINDRONE ACETATE AND ETHINYL ESTRADIOL 1.5; .03 MG/1; MG/1
1 TABLET ORAL
COMMUNITY
Start: 2024-03-15 | End: 2024-05-02

## 2024-03-18 NOTE — PROGRESS NOTES
NEW ONCOLOGY VISIT - ESTABLISH CARE    Subjective:      Patient ID: Mary aKte Lima    Chief Complaint: Iron Deficiency Anemia    HPI  Mary Kate Lima is a 48 y.o. female, referred by No ref. provider found, to clinic for evaluation and management of Iron Deficiency Anemia. She has a PMH of Depression, Menorrhagia, and Anxiety. She presented to her PCP for routine follow-up but endorsed worsening fatigue, menorrhagia, headaches, and restless legs at night. Of note, she has a history of menorrhagia and typically has a 5 day heavy cycle and has recently been placed back on OCP's with mild improvement in her symptoms. She denies ever being told previously she was anemic or receiving any iron supplementation.       Interval History: She is here for a follow up visit. She received iv injectafer in  Jan 2024 ( 2 infusions of 750mg each)        Review of Systems   Constitutional:  Negative for chills, fever, malaise/fatigue and weight loss.   HENT:  Negative for ear discharge and ear pain.    Eyes:  Negative for blurred vision, double vision, photophobia and discharge.   Respiratory:  Negative for cough and shortness of breath.    Cardiovascular:  Negative for chest pain, orthopnea and claudication.   Gastrointestinal:  Negative for constipation, heartburn and vomiting.   Genitourinary:  Negative for dysuria and urgency.   Musculoskeletal:  Negative for back pain, joint pain and myalgias.   Neurological:  Negative for dizziness and speech change.   Endo/Heme/Allergies:  Negative for environmental allergies. Does not bruise/bleed easily.   Psychiatric/Behavioral:  Negative for depression, hallucinations and substance abuse.         Allergies:  Review of patient's allergies indicates:  No Known Allergies    Medications:  Current Outpatient Medications   Medication Sig Dispense Refill    buPROPion (WELLBUTRIN XL) 300 MG 24 hr tablet Take 1 tablet (300 mg total) by mouth once daily. 30 tablet 11    busPIRone (BUSPAR)  "10 MG tablet Take 1 tablet (10 mg total) by mouth 2 (two) times daily. 60 tablet 11    CHRISTIANO 1.5-30 mg-mcg Tab Take 1 tablet by mouth.      traZODone (DESYREL) 50 MG tablet Take 1 tablet (50 mg total) by mouth every evening. 30 tablet 11    ALPRAZolam (XANAX) 0.25 MG tablet Take 1 tablet (0.25 mg total) by mouth daily as needed for Anxiety (anxiety, panic attack). 10 tablet 0     No current facility-administered medications for this visit.           Objective:      Vitals:    03/18/24 0847   BP: (!) 124/58   BP Location: Left arm   Patient Position: Sitting   Pulse: 78   Resp: 18   Temp: 98.2 °F (36.8 °C)   TempSrc: Oral   SpO2: 99%   Weight: 62.8 kg (138 lb 7.2 oz)   Height: 5' 6" (1.676 m)     Physical Exam  Constitutional:       Appearance: Normal appearance.   Neurological:      Mental Status: She is alert.   Psychiatric:         Behavior: Behavior normal.         LABS:  WBC   Date Value Ref Range Status   03/18/2024 6.67 3.90 - 12.70 K/uL Final     Hemoglobin   Date Value Ref Range Status   03/18/2024 12.6 12.0 - 16.0 g/dL Final     Hematocrit   Date Value Ref Range Status   03/18/2024 38.8 37.0 - 48.5 % Final     Platelets   Date Value Ref Range Status   03/18/2024 256 150 - 450 K/uL Final       Chemistry        Component Value Date/Time     03/18/2024 0817    K 5.1 03/18/2024 0817     03/18/2024 0817    CO2 23 03/18/2024 0817    BUN 11 03/18/2024 0817    CREATININE 0.9 03/18/2024 0817    GLU 90 03/18/2024 0817        Component Value Date/Time    CALCIUM 9.9 03/18/2024 0817    ALKPHOS 38 (L) 03/18/2024 0817    AST 18 03/18/2024 0817    ALT 16 03/18/2024 0817    BILITOT 0.4 03/18/2024 0817        Component      Latest Ref Rng 3/18/2024   WBC      3.90 - 12.70 K/uL 6.67    RBC      4.00 - 5.40 M/uL 4.34    Hemoglobin      12.0 - 16.0 g/dL 12.6    Hematocrit      37.0 - 48.5 % 38.8    MCV      82 - 98 fL 89    MCH      27.0 - 31.0 pg 29.0    MCHC      32.0 - 36.0 g/dL 32.5    RDW      11.5 - 14.5 " % 16.3 (H)    Platelet Count      150 - 450 K/uL 256    MPV      9.2 - 12.9 fL 10.7    Immature Granulocytes      0.0 - 0.5 % 0.3    Gran # (ANC)      1.8 - 7.7 K/uL 4.6    Immature Grans (Abs)      0.00 - 0.04 K/uL 0.02    Lymph #      1.0 - 4.8 K/uL 1.4    Mono #      0.3 - 1.0 K/uL 0.5    Eos #      0.0 - 0.5 K/uL 0.1    Baso #      0.00 - 0.20 K/uL 0.04    nRBC      0 /100 WBC 0    Gran %      38.0 - 73.0 % 68.5    Lymph %      18.0 - 48.0 % 21.6    Mono %      4.0 - 15.0 % 6.9    Eos %      0.0 - 8.0 % 2.1    Basophil %      0.0 - 1.9 % 0.6    Differential Method Automated    Iron      30 - 160 ug/dL 146    Transferrin      200 - 375 mg/dL 286    TIBC      250 - 450 ug/dL 423    Saturated Iron      20 - 50 % 35              Assessment/Plan        1. Iron feficiency anemia secondary to menorrhagia  -She has long-history of menorrhagia with recently placed on OCP with mild improvement  -No family history of personal history of bleeding issues other than menorrhagia  -Transferrin saturation was  4% previously, consistent with CATALINA  -No prior history of CATALINA or needing iron supplementation  -She is following with GYN  She received 2 infusions of injectafer ion Jan 2024; TIBC now normal              BMT Chart Routing      Follow up with physician    Follow up with [facility] 3 months. with Tawanna   Provider visit type    Infusion scheduling note    Injection scheduling note    Labs CBC, ferritin and iron and TIBC   Scheduling:  Preferred lab:  Lab interval:     Imaging    Pharmacy appointment    Other referrals

## 2024-03-26 ENCOUNTER — OFFICE VISIT (OUTPATIENT)
Dept: OBSTETRICS AND GYNECOLOGY | Facility: CLINIC | Age: 49
End: 2024-03-26
Attending: STUDENT IN AN ORGANIZED HEALTH CARE EDUCATION/TRAINING PROGRAM
Payer: COMMERCIAL

## 2024-03-26 VITALS
SYSTOLIC BLOOD PRESSURE: 120 MMHG | DIASTOLIC BLOOD PRESSURE: 64 MMHG | BODY MASS INDEX: 22.51 KG/M2 | WEIGHT: 139.44 LBS

## 2024-03-26 DIAGNOSIS — D21.9 LEIOMYOMA: ICD-10-CM

## 2024-03-26 DIAGNOSIS — R68.82 DECREASED LIBIDO: ICD-10-CM

## 2024-03-26 DIAGNOSIS — N93.9 ABNORMAL UTERINE BLEEDING (AUB): Primary | ICD-10-CM

## 2024-03-26 PROCEDURE — 99214 OFFICE O/P EST MOD 30 MIN: CPT | Mod: S$GLB,,, | Performed by: STUDENT IN AN ORGANIZED HEALTH CARE EDUCATION/TRAINING PROGRAM

## 2024-03-26 PROCEDURE — 3078F DIAST BP <80 MM HG: CPT | Mod: CPTII,S$GLB,, | Performed by: STUDENT IN AN ORGANIZED HEALTH CARE EDUCATION/TRAINING PROGRAM

## 2024-03-26 PROCEDURE — 3008F BODY MASS INDEX DOCD: CPT | Mod: CPTII,S$GLB,, | Performed by: STUDENT IN AN ORGANIZED HEALTH CARE EDUCATION/TRAINING PROGRAM

## 2024-03-26 PROCEDURE — 3074F SYST BP LT 130 MM HG: CPT | Mod: CPTII,S$GLB,, | Performed by: STUDENT IN AN ORGANIZED HEALTH CARE EDUCATION/TRAINING PROGRAM

## 2024-03-26 PROCEDURE — 99999 PR PBB SHADOW E&M-EST. PATIENT-LVL III: CPT | Mod: PBBFAC,,, | Performed by: STUDENT IN AN ORGANIZED HEALTH CARE EDUCATION/TRAINING PROGRAM

## 2024-03-26 PROCEDURE — 1159F MED LIST DOCD IN RCRD: CPT | Mod: CPTII,S$GLB,, | Performed by: STUDENT IN AN ORGANIZED HEALTH CARE EDUCATION/TRAINING PROGRAM

## 2024-03-26 NOTE — PROGRESS NOTES
Chief Complaint: U/S Results     HPI:      Mary Kate Lima is a 48 y.o.  who presents today for follow up of U/S results.  Has had irregular bleeding on ocp for the past 3 months.  Has had a history of heavier cycles over the past few years. EMB negative last year.  Started ocp last year which improved symptoms until recently.  Has had bleeding daily.  Some days heavier and other days lighter.  Did not do ocp cascade.  LMP: Patient's last menstrual period was 2024..  Also reports decreased sex drive and fatigue.  Is getting iron infusions.  No other issues, problems, or complaints.     OB History          3    Para   3    Term   3            AB        Living   3         SAB        IAB        Ectopic        Multiple        Live Births   3           Obstetric Comments   Menarche age 14             Past Medical History:   Diagnosis Date    Abnormal Pap smear of cervix      Past Surgical History:   Procedure Laterality Date     SECTION      x3    ELBOW SURGERY Right     HEMORRHOID SURGERY      TONSILLECTOMY  78    TUBAL LIGATION       Social History     Socioeconomic History    Marital status:    Tobacco Use    Smoking status: Never    Smokeless tobacco: Never   Substance and Sexual Activity    Alcohol use: Yes     Alcohol/week: 4.0 standard drinks of alcohol     Types: 1 Glasses of wine, 3 Shots of liquor per week     Comment: socially    Drug use: Never    Sexual activity: Yes     Partners: Male     Birth control/protection: See Surgical Hx, Other-see comments     Comment: Tubal ligation     Social Determinants of Health     Financial Resource Strain: Low Risk  (2023)    Overall Financial Resource Strain (CARDIA)     Difficulty of Paying Living Expenses: Not hard at all   Food Insecurity: No Food Insecurity (2023)    Hunger Vital Sign     Worried About Running Out of Food in the Last Year: Never true     Ran Out of Food in the Last Year: Never true    Transportation Needs: No Transportation Needs (12/18/2023)    PRAPARE - Transportation     Lack of Transportation (Medical): No     Lack of Transportation (Non-Medical): No   Physical Activity: Sufficiently Active (12/18/2023)    Exercise Vital Sign     Days of Exercise per Week: 6 days     Minutes of Exercise per Session: 70 min   Stress: Stress Concern Present (12/18/2023)    [employer] - Occupational Stress Questionnaire     Feeling of Stress : Very much   Social Connections: Unknown (12/18/2023)    Social Connection and Isolation Panel [NHANES]     Frequency of Communication with Friends and Family: More than three times a week     Frequency of Social Gatherings with Friends and Family: Twice a week     Active Member of Clubs or Organizations: No     Attends Club or Organization Meetings: Patient declined     Marital Status:    Housing Stability: Low Risk  (12/18/2023)    Housing Stability Vital Sign     Unable to Pay for Housing in the Last Year: No     Number of Places Lived in the Last Year: 1     Unstable Housing in the Last Year: No     Family History   Problem Relation Age of Onset    Breast cancer Paternal Grandmother     Breast cancer Paternal Aunt     Diabetes Maternal Grandmother     Colon cancer Neg Hx     Ovarian cancer Neg Hx        Current Outpatient Medications:     buPROPion (WELLBUTRIN XL) 300 MG 24 hr tablet, Take 1 tablet (300 mg total) by mouth once daily., Disp: 30 tablet, Rfl: 11    busPIRone (BUSPAR) 10 MG tablet, Take 1 tablet (10 mg total) by mouth 2 (two) times daily., Disp: 60 tablet, Rfl: 11    CHRISTIANO 1.5-30 mg-mcg Tab, Take 1 tablet by mouth., Disp: , Rfl:     traZODone (DESYREL) 50 MG tablet, Take 1 tablet (50 mg total) by mouth every evening., Disp: 30 tablet, Rfl: 11    ALPRAZolam (XANAX) 0.25 MG tablet, Take 1 tablet (0.25 mg total) by mouth daily as needed for Anxiety (anxiety, panic attack)., Disp: 10 tablet, Rfl: 0    ROS:     GENERAL:  Denies unintentional weight gain or weight loss. Feeling well overall.   SKIN: Denies rash or lesions.   HEENT: Denies headaches, or vision changes.   ABDOMEN: Denies abdominal pain, constipation, diarrhea, nausea, vomiting, change in appetite.  URINARY: Denies frequency, dysuria, hematuria.  NEUROLOGIC: Denies syncope or weakness.   PSYCHIATRIC: Denies depression, anxiety or mood swings.    Physical Exam:      PHYSICAL EXAM:  /64   Wt 63.3 kg (139 lb 7.1 oz)   LMP 2024   BMI 22.51 kg/m²   Body mass index is 22.51 kg/m².     APPEARANCE: Well nourished, well developed, in no acute distress.  PSYCH: Appropriate mood and affect.  SKIN: No acne or hirsutism.      Assessment/Plan:     48 y.o.     Abnormal uterine bleeding (AUB)    Leiomyoma    Decreased libido          Counselin.  AUB:  U/S results reviewed with patient.  Several uterine fibroids present.  1 appears partly submucosal.  Reviewed treatment options again with patient.  Is no longer getting relief with OCP.  Will do ocp to get bleeding to stop and then discussed other options.  Reviewed expectant, medical, surgical options.  R/B/A reviewed including but not limited to risk of cramping, irregular bleeding, nausea, bloating, breast tenderness, headache, stroke, blood clot, heart attack.  Patient voiced understanding and desires to proceed with treatment.  Is interested in possible acessa - will review images to see if candidate and discuss with consulting team.  Also discussed other options if needed.  At this point would like to avoid hysterectomy due to time she would need off work but she will consider.  She declines repeat emb today but will set up at another day.  All questions answered and she is in agreement with plan.  Will also set up with hormone specialty clinic to help with libido side of things once bleeding addressed.  All questions answered.  Will reach out for scheduling.    Face to Face time with patient: 20  30  minutes of total time spent on the encounter, which includes face to face time and non-face to face time preparing to see the patient (eg, review of tests), Obtaining and/or reviewing separately obtained history, Documenting clinical information in the electronic or other health record, Independently interpreting results (not separately reported) and communicating results to the patient/family/caregiver, or Care coordination (not separately reported).

## 2024-03-28 ENCOUNTER — TELEPHONE (OUTPATIENT)
Dept: OBSTETRICS AND GYNECOLOGY | Facility: CLINIC | Age: 49
End: 2024-03-28
Payer: COMMERCIAL

## 2024-03-28 DIAGNOSIS — N93.9 ABNORMAL UTERINE BLEEDING (AUB): Primary | ICD-10-CM

## 2024-03-28 NOTE — TELEPHONE ENCOUNTER
Spoke with patient.  All questions answered.  Reviewed imaging.  Will go ahead with hysteroscopy D&C.  Will attempt myomectomy at the time as well.  Also discussed IUD insertion.  She is open and would like to proceed.        Requested Date:  4/2/24    Requested Time: 0700  Case Length:60 minutes    Surgeon: Tg Smith      Assistant Surgeon: None   Visit Type: Outpatient    LOCATION: [facility]    PROCEDURE:hysteroscopic myomectomy, iud insertion  Diagnosis:aub  Anesthesia type: General   Comments/Special Equipment Needed:  Rep needed: No  Does the patient need a PreOp appointment with MD: No  U/S needed at pre-op: No  PCP clearance: Not Needed  When does she need her Post op appointment: 2 weeks in person  Do you need additional time blocked out from clinic? No  If so, what time do you want to be back in clinic? na  When can the patient go back to work? one week

## 2024-03-28 NOTE — H&P
[facility] - Surgery ([place])  History & Physical  Gynecology    SUBJECTIVE:     Chief Complaint/Reason for Admission: Abnormal uterine bleeding, uterine fibroids    History of Present Illness:  Patient is a 48 y.o.  who presents with abnormal uterine bleeding.  Has a history of heavy and irregular cycles for the past few years.  Started ocps last year with some improvement but symptoms have worsened over the past 3 months.  Is bleeding almost daily.  Bleeding is often heavy.  No dizziness, cp, sob, palpitations, dizziness.  No other issues or concerns.      No medications prior to admission.       Review of patient's allergies indicates:  No Known Allergies    OB History          3    Para   3    Term   3            AB        Living   3         SAB        IAB        Ectopic        Multiple        Live Births   3           Obstetric Comments   Menarche age 14             Past Medical History:   Diagnosis Date    Abnormal Pap smear of cervix      Past Surgical History:   Procedure Laterality Date     SECTION      x3    ELBOW SURGERY Right     HEMORRHOID SURGERY      TONSILLECTOMY  76    TUBAL LIGATION       Family History   Problem Relation Age of Onset    Breast cancer Paternal Grandmother     Breast cancer Paternal Aunt     Diabetes Maternal Grandmother     Colon cancer Neg Hx     Ovarian cancer Neg Hx      Social History     Tobacco Use    Smoking status: Never    Smokeless tobacco: Never   Substance Use Topics    Alcohol use: Yes     Alcohol/week: 4.0 standard drinks of alcohol     Types: 1 Glasses of wine, 3 Shots of liquor per week     Comment: socially    Drug use: Never         ROS:     GENERAL: Denies unintentional weight gain or weight loss. Feeling well overall.   SKIN: Denies rash or lesions.   HEENT: Denies headaches, or vision changes.   CARDIOVASCULAR: Denies palpitations or chest pain.   RESPIRATORY: Denies shortness of breath or dyspnea on exertion.  BREASTS: Denies pain,  lumps, or nipple discharge.   ABDOMEN: Denies abdominal pain, constipation, diarrhea, nausea, vomiting, change in appetite.  URINARY: Denies frequency, dysuria, hematuria.  NEUROLOGIC: Denies syncope or weakness.   PSYCHIATRIC: Denies depression, anxiety or mood swings.    Physical Exam:      PHYSICAL EXAM:  LMP 03/08/2024   There is no height or weight on file to calculate BMI.     APPEARANCE: Well nourished, well developed, in no acute distress.  PSYCH: Appropriate mood and affect.  SKIN: No acne or hirsutism.  ABDOMEN: Soft.  No tenderness or masses.    PELVIC: Normal external genitalia without lesions.  Normal hair distribution.  Adequate perineal body, normal urethral meatus.  Vagina moist and well rugated without lesions or discharge.  Cervix pink, without lesions, discharge or tenderness.  No significant cystocele or rectocele.  Bimanual exam shows uterus to be normal size, regular, mobile and nontender.  Adnexa without masses or tenderness.    EXTREMITIES: No edema.  No tenderness to palpation.  Exam performed in clinic 3/2024    U/S Uterus 13 x 7 x 8 cm with several leiomyomas.  Fundal submucosal fibroid 3 cm.  2 subserosal fibroids (4 and 2 cm).  R ovary 3 x 2 x 1 cm, L ovary 3 x 1 x 1 cm.  Pap 2023 NEM, HPV neg  EMB 2023 Negative    TSH 2023 1.2  H/H 2024 13/39    ASSESSMENT/PLAN:     AUB  Uterine fibroids    Will plan to proceed with Hysteroscopy D&C and myomectomy.  Also discussed IUD insertion at time of procedure.  With the patient I had a full discussion of the risks, benefits, and alternatives of all procedures to be performed.  Discussed expectant management with serial ultrasounds and medical management.  Discussed risks of procedure including but not limited to risk of infection, postoperative pain and difficulty healing, hemorrhage requiring possible blood transfusion or need for hysterectomy, damage to surrounding tissue including but not limited to bladder, ureter, bowel, uterus, ovaries.   Also discussed possible need for laparoscopy or abdominal incision should complication occur, medical complications of surgery including but not limited to blood clot, pneumonia, prolonged hospitalization, death.  Reviewed risks, benefits, alternatives to IUD insertion. Reviewed liletta IUD and not fda approved for management of aub but has same hormone as mirena. Reviewed risks including but not limited to pain, bleeding, infection, damage to surrounding tissue areas, scarring, uterine perforation, device expulsion.   Discussed failure to resolve problem and need for further procedures depending on results of surgery.  Also reviewed recurrence of disease state.  All questions answered.    She was also counseled on risks of blood transfusion including but not limited to transfusion reaction and infection.  She is amenable to transfusion if needed.  All of her questions were answered, and she voiced understanding. She agrees with the plan of care; therefore, we will proceed with the surgery as scheduled.  Discussed surgical plan with patient and all questions answered.

## 2024-04-01 ENCOUNTER — ANESTHESIA EVENT (OUTPATIENT)
Dept: SURGERY | Facility: OTHER | Age: 49
End: 2024-04-01
Payer: COMMERCIAL

## 2024-04-01 NOTE — PRE-PROCEDURE INSTRUCTIONS
Information to Prepare you for your Surgery    PRE-ADMIT TESTING -  [phone]    [address]  [facility]          Your surgery has been scheduled at [facility]. We are pleased to have the opportunity to serve you. For Further Information please call [phone].    On the day of surgery please report to the Information Desk on the 1st floor.    CONTACT YOUR PHYSICIAN'S OFFICE THE DAY PRIOR TO YOUR SURGERY TO OBTAIN YOUR ARRIVAL TIME.     The evening before surgery do not eat anything after 9 p.m. ( this includes hard candy, chewing gum and mints).  You may only have GATORADE, POWERADE AND WATER  from 9 p.m. until you leave your home.   DO NOT DRINK ANY LIQUIDS ON THE WAY TO THE HOSPITAL.      Why does your anesthesiologist allow you to drink Gatorade/Powerade before surgery?  Gatorade/Powerade helps to increase your comfort before surgery and to decrease your nausea after surgery. The carbohydrates in Gatorade/Powerade help reduce your body's stress response to surgery.  If you are a diabetic-drink only water prior to surgery.    Outpatient Surgery- May allow 2 adult (18 and older) Support Persons (1 being the designated ) for all surgical/procedural patients. A breastfeeding mother will be allowed her infant and 2 adult Support Persons. No one under the age of 18 will be allowed in the building. No swapping out of visitors in the [facility].      SPECIAL MEDICATION INSTRUCTIONS: TAKE medications checked off by the Anesthesiologist on your Medication List.    Angiogram Patients: Take medications as instructed by your physician, including aspirin.     Surgery Patients:    If you take ASPIRIN - Your PHYSICIAN/SURGEON will need to inform you IF/OR when you need to stop taking aspirin prior to your surgery.     The week prior to surgery do not ot take any medications containing IBUPROFEN or NSAIDS ( Advil, Motrin, Goodys, BC, Aleve, Naproxen etc)  If you are not sure if you should take a medicine please call your surgeon's office.  Ok to take Tylenol    Do Not Wear any make-up (especially eye make-up) to surgery. Please remove any false eyelashes or eyelash extensions. If you arrive the day of surgery with makeup/eyelashes on you will be required to remove prior to surgery. (There is a risk of corneal abrasions if eye makeup/eyelash extensions are not removed)      Leave all valuables at home.   Do Not wear any jewelry or watches, including any metal in body piercings. Jewelry must be removed prior to coming to the hospital.  There is a possibility that rings that are unable to be removed may be cut off if they are on the surgical extremity.    Please remove all hair extensions, wigs, clips and any other metal accessories/ ornaments from your hair.  These items may pose a flammable/fire risk in Surgery and must be removed.    Do not shave your surgical area at least 5 days prior to your surgery. The surgical prep will be performed at the hospital according to Infection Control regulations.    Contact Lens must be removed before surgery. Either do not wear the contact lens or bring a case and solution for storage.  Please bring a container for eyeglasses or dentures as required.  Bring any paperwork your physician has provided, such as consent forms,  history and physicals, doctor's orders, etc.   Bring comfortable clothes that are loose fitting to wear upon discharge. Take into consideration the type of surgery being performed.  Maintain your diet as advised per your physician the day prior to surgery.      Adequate rest the night before surgery is advised.   Park in the Parking lot behind the hospital or in the [facility] across the street from the parking lot. Parking is complimentary.  If you will be discharged the same day as your procedure, please arrange for a responsible adult to drive you home or to accompany you if traveling by taxi.    YOU WILL NOT BE PERMITTED TO DRIVE OR TO LEAVE THE HOSPITAL ALONE AFTER SURGERY.   If you are being discharged the same day, it is strongly recommended that you arrange for someone to remain with you for the first 24 hrs following your surgery.    The Surgeon will speak to your family/visitor after your surgery regarding the outcome of your surgery and post op care.  The Surgeon may speak to you after your surgery, but there is a possibility you may not remember the details.  Please check with your family members regarding the conversation with the Surgeon.    We strongly recommend whoever is bringing you home be present for discharge instructions.  This will ensure a thorough understanding for your post op home care.          Thank you for your cooperation.  The Staff of [facility].            Bathing Instructions with Hibiclens    Shower the evening before and morning of your procedure with Chlorhexidine (Hibiclens)  do not use Chlorhexidine on your face or genitals. Do not get in your eyes.  Wash your face with water and your regular face wash/soap  Use your regular shampoo  Apply Chlorhexidine (Hibiclens) directly on your skin or on a wet washcloth and wash gently. When showering: Move away from the shower stream when applying Chlorhexidine (Hibiclens) to avoid rinsing off too soon.  Rinse thoroughly with warm water  Do not dilute Chlorhexidine (Hibiclens)   Dry off as usual, do not use any deodorant, powder, body lotions, perfume, after shave or cologne.

## 2024-04-02 ENCOUNTER — HOSPITAL ENCOUNTER (OUTPATIENT)
Facility: OTHER | Age: 49
Discharge: HOME | End: 2024-04-02
Attending: STUDENT IN AN ORGANIZED HEALTH CARE EDUCATION/TRAINING PROGRAM | Admitting: STUDENT IN AN ORGANIZED HEALTH CARE EDUCATION/TRAINING PROGRAM
Payer: COMMERCIAL

## 2024-04-02 ENCOUNTER — ANESTHESIA (OUTPATIENT)
Dept: SURGERY | Facility: OTHER | Age: 49
End: 2024-04-02
Payer: COMMERCIAL

## 2024-04-02 DIAGNOSIS — N93.9 ABNORMAL UTERINE BLEEDING (AUB): Primary | ICD-10-CM

## 2024-04-02 LAB
ABO + RH BLD: NORMAL
B-HCG UR QL: NEGATIVE
BASOPHILS # BLD AUTO: 0.05 K/UL (ref 0–0.2)
BASOPHILS NFR BLD: 0.7 % (ref 0–1.9)
BLD GP AB SCN CELLS X3 SERPL QL: NORMAL
CTP QC/QA: YES
DIFFERENTIAL METHOD BLD: NORMAL
EOSINOPHIL # BLD AUTO: 0.2 K/UL (ref 0–0.5)
EOSINOPHIL NFR BLD: 2.6 % (ref 0–8)
ERYTHROCYTE [DISTWIDTH] IN BLOOD BY AUTOMATED COUNT: 13.6 % (ref 11.5–14.5)
HCT VFR BLD AUTO: 37.2 % (ref 37–48.5)
HGB BLD-MCNC: 12.5 G/DL (ref 12–16)
IMM GRANULOCYTES # BLD AUTO: 0.03 K/UL (ref 0–0.04)
IMM GRANULOCYTES NFR BLD AUTO: 0.4 % (ref 0–0.5)
LYMPHOCYTES # BLD AUTO: 1.9 K/UL (ref 1–4.8)
LYMPHOCYTES NFR BLD: 27 % (ref 18–48)
MCH RBC QN AUTO: 29.3 PG (ref 27–31)
MCHC RBC AUTO-ENTMCNC: 33.6 G/DL (ref 32–36)
MCV RBC AUTO: 87 FL (ref 82–98)
MONOCYTES # BLD AUTO: 0.6 K/UL (ref 0.3–1)
MONOCYTES NFR BLD: 8.5 % (ref 4–15)
NEUTROPHILS # BLD AUTO: 4.2 K/UL (ref 1.8–7.7)
NEUTROPHILS NFR BLD: 60.8 % (ref 38–73)
NRBC BLD-RTO: 0 /100 WBC
PLATELET # BLD AUTO: 244 K/UL (ref 150–450)
PMV BLD AUTO: 9.6 FL (ref 9.2–12.9)
RBC # BLD AUTO: 4.27 M/UL (ref 4–5.4)
SPECIMEN OUTDATE: NORMAL
WBC # BLD AUTO: 6.85 K/UL (ref 3.9–12.7)

## 2024-04-02 PROCEDURE — 63600175 PHARM REV CODE 636 W HCPCS: Performed by: STUDENT IN AN ORGANIZED HEALTH CARE EDUCATION/TRAINING PROGRAM

## 2024-04-02 PROCEDURE — 86901 BLOOD TYPING SEROLOGIC RH(D): CPT | Performed by: STUDENT IN AN ORGANIZED HEALTH CARE EDUCATION/TRAINING PROGRAM

## 2024-04-02 PROCEDURE — 37000008 HC ANESTHESIA 1ST 15 MINUTES: Performed by: STUDENT IN AN ORGANIZED HEALTH CARE EDUCATION/TRAINING PROGRAM

## 2024-04-02 PROCEDURE — 71000033 HC RECOVERY, INTIAL HOUR: Performed by: STUDENT IN AN ORGANIZED HEALTH CARE EDUCATION/TRAINING PROGRAM

## 2024-04-02 PROCEDURE — 63600175 PHARM REV CODE 636 W HCPCS: Performed by: ANESTHESIOLOGY

## 2024-04-02 PROCEDURE — 25000003 PHARM REV CODE 250: Performed by: STUDENT IN AN ORGANIZED HEALTH CARE EDUCATION/TRAINING PROGRAM

## 2024-04-02 PROCEDURE — 88305 TISSUE EXAM BY PATHOLOGIST: CPT | Mod: 26,,, | Performed by: PATHOLOGY

## 2024-04-02 PROCEDURE — 37000009 HC ANESTHESIA EA ADD 15 MINS: Performed by: STUDENT IN AN ORGANIZED HEALTH CARE EDUCATION/TRAINING PROGRAM

## 2024-04-02 PROCEDURE — D9220A PRA ANESTHESIA: Mod: ANES,,, | Performed by: ANESTHESIOLOGY

## 2024-04-02 PROCEDURE — 71000015 HC POSTOP RECOV 1ST HR: Performed by: STUDENT IN AN ORGANIZED HEALTH CARE EDUCATION/TRAINING PROGRAM

## 2024-04-02 PROCEDURE — 81025 URINE PREGNANCY TEST: CPT | Performed by: ANESTHESIOLOGY

## 2024-04-02 PROCEDURE — 58300 INSERT INTRAUTERINE DEVICE: CPT | Mod: 51,,, | Performed by: STUDENT IN AN ORGANIZED HEALTH CARE EDUCATION/TRAINING PROGRAM

## 2024-04-02 PROCEDURE — 36000706: Performed by: STUDENT IN AN ORGANIZED HEALTH CARE EDUCATION/TRAINING PROGRAM

## 2024-04-02 PROCEDURE — 58561 HYSTEROSCOPY REMOVE MYOMA: CPT | Mod: ,,, | Performed by: STUDENT IN AN ORGANIZED HEALTH CARE EDUCATION/TRAINING PROGRAM

## 2024-04-02 PROCEDURE — 85025 COMPLETE CBC W/AUTO DIFF WBC: CPT | Performed by: STUDENT IN AN ORGANIZED HEALTH CARE EDUCATION/TRAINING PROGRAM

## 2024-04-02 PROCEDURE — 88305 TISSUE EXAM BY PATHOLOGIST: CPT | Mod: 59 | Performed by: PATHOLOGY

## 2024-04-02 PROCEDURE — 36000707: Performed by: STUDENT IN AN ORGANIZED HEALTH CARE EDUCATION/TRAINING PROGRAM

## 2024-04-02 PROCEDURE — 36415 COLL VENOUS BLD VENIPUNCTURE: CPT | Performed by: STUDENT IN AN ORGANIZED HEALTH CARE EDUCATION/TRAINING PROGRAM

## 2024-04-02 PROCEDURE — 63600175 PHARM REV CODE 636 W HCPCS: Mod: JZ,JG | Performed by: STUDENT IN AN ORGANIZED HEALTH CARE EDUCATION/TRAINING PROGRAM

## 2024-04-02 PROCEDURE — 71000016 HC POSTOP RECOV ADDL HR: Performed by: STUDENT IN AN ORGANIZED HEALTH CARE EDUCATION/TRAINING PROGRAM

## 2024-04-02 PROCEDURE — 27201423 OPTIME MED/SURG SUP & DEVICES STERILE SUPPLY: Performed by: STUDENT IN AN ORGANIZED HEALTH CARE EDUCATION/TRAINING PROGRAM

## 2024-04-02 PROCEDURE — 25000003 PHARM REV CODE 250: Performed by: ANESTHESIOLOGY

## 2024-04-02 PROCEDURE — D9220A PRA ANESTHESIA: Mod: CRNA,,, | Performed by: STUDENT IN AN ORGANIZED HEALTH CARE EDUCATION/TRAINING PROGRAM

## 2024-04-02 PROCEDURE — C1782 MORCELLATOR: HCPCS | Performed by: STUDENT IN AN ORGANIZED HEALTH CARE EDUCATION/TRAINING PROGRAM

## 2024-04-02 DEVICE — IMPLANTABLE DEVICE: Type: IMPLANTABLE DEVICE | Site: CERVIX | Status: FUNCTIONAL

## 2024-04-02 RX ORDER — OXYCODONE HYDROCHLORIDE 5 MG/1
5 TABLET ORAL EVERY 4 HOURS PRN
Status: DISCONTINUED | OUTPATIENT
Start: 2024-04-02 | End: 2024-04-02 | Stop reason: HOSPADM

## 2024-04-02 RX ORDER — SILVER NITRATE 38.21; 12.74 MG/1; MG/1
STICK TOPICAL
Status: DISCONTINUED | OUTPATIENT
Start: 2024-04-02 | End: 2024-04-02 | Stop reason: HOSPADM

## 2024-04-02 RX ORDER — DEXTROSE, SODIUM CHLORIDE, SODIUM LACTATE, POTASSIUM CHLORIDE, AND CALCIUM CHLORIDE 5; .6; .31; .03; .02 G/100ML; G/100ML; G/100ML; G/100ML; G/100ML
INJECTION, SOLUTION INTRAVENOUS CONTINUOUS
Status: ACTIVE | OUTPATIENT
Start: 2024-04-02

## 2024-04-02 RX ORDER — HYDROMORPHONE HYDROCHLORIDE 2 MG/ML
0.4 INJECTION, SOLUTION INTRAMUSCULAR; INTRAVENOUS; SUBCUTANEOUS EVERY 5 MIN PRN
Status: DISCONTINUED | OUTPATIENT
Start: 2024-04-02 | End: 2024-04-02 | Stop reason: HOSPADM

## 2024-04-02 RX ORDER — LIDOCAINE HYDROCHLORIDE 20 MG/ML
INJECTION INTRAVENOUS
Status: DISCONTINUED | OUTPATIENT
Start: 2024-04-02 | End: 2024-04-02

## 2024-04-02 RX ORDER — VASOPRESSIN 20 [USP'U]/ML
INJECTION, SOLUTION INTRAMUSCULAR; SUBCUTANEOUS
Status: DISCONTINUED | OUTPATIENT
Start: 2024-04-02 | End: 2024-04-02 | Stop reason: HOSPADM

## 2024-04-02 RX ORDER — DIPHENHYDRAMINE HCL 25 MG
25 CAPSULE ORAL EVERY 4 HOURS PRN
Status: DISCONTINUED | OUTPATIENT
Start: 2024-04-02 | End: 2024-04-02 | Stop reason: HOSPADM

## 2024-04-02 RX ORDER — MEPERIDINE HYDROCHLORIDE 25 MG/ML
12.5 INJECTION INTRAMUSCULAR; INTRAVENOUS; SUBCUTANEOUS ONCE AS NEEDED
Status: DISCONTINUED | OUTPATIENT
Start: 2024-04-02 | End: 2024-04-02 | Stop reason: HOSPADM

## 2024-04-02 RX ORDER — OXYCODONE AND ACETAMINOPHEN 5; 325 MG/1; MG/1
1 TABLET ORAL EVERY 4 HOURS PRN
Qty: 5 TABLET | Refills: 0 | Status: SHIPPED | OUTPATIENT
Start: 2024-04-02 | End: 2024-06-03

## 2024-04-02 RX ORDER — DEXTROSE MONOHYDRATE AND SODIUM CHLORIDE 5; .45 G/100ML; G/100ML
INJECTION, SOLUTION INTRAVENOUS CONTINUOUS
Status: DISCONTINUED | OUTPATIENT
Start: 2024-04-02 | End: 2024-04-02 | Stop reason: HOSPADM

## 2024-04-02 RX ORDER — FENTANYL CITRATE 50 UG/ML
INJECTION, SOLUTION INTRAMUSCULAR; INTRAVENOUS
Status: DISCONTINUED | OUTPATIENT
Start: 2024-04-02 | End: 2024-04-02

## 2024-04-02 RX ORDER — PROCHLORPERAZINE EDISYLATE 5 MG/ML
5 INJECTION INTRAMUSCULAR; INTRAVENOUS EVERY 30 MIN PRN
Status: DISCONTINUED | OUTPATIENT
Start: 2024-04-02 | End: 2024-04-02 | Stop reason: HOSPADM

## 2024-04-02 RX ORDER — DEXAMETHASONE SODIUM PHOSPHATE 4 MG/ML
INJECTION, SOLUTION INTRA-ARTICULAR; INTRALESIONAL; INTRAMUSCULAR; INTRAVENOUS; SOFT TISSUE
Status: DISCONTINUED | OUTPATIENT
Start: 2024-04-02 | End: 2024-04-02

## 2024-04-02 RX ORDER — ACETAMINOPHEN 10 MG/ML
1000 INJECTION, SOLUTION INTRAVENOUS ONCE
Status: COMPLETED | OUTPATIENT
Start: 2024-04-02 | End: 2024-04-02

## 2024-04-02 RX ORDER — FAMOTIDINE 20 MG/1
20 TABLET, FILM COATED ORAL
Status: COMPLETED | OUTPATIENT
Start: 2024-04-02 | End: 2024-04-02

## 2024-04-02 RX ORDER — DIPHENHYDRAMINE HYDROCHLORIDE 50 MG/ML
12.5 INJECTION INTRAMUSCULAR; INTRAVENOUS EVERY 30 MIN PRN
Status: DISCONTINUED | OUTPATIENT
Start: 2024-04-02 | End: 2024-04-02 | Stop reason: HOSPADM

## 2024-04-02 RX ORDER — SODIUM CHLORIDE 0.9 % (FLUSH) 0.9 %
3 SYRINGE (ML) INJECTION
Status: DISCONTINUED | OUTPATIENT
Start: 2024-04-02 | End: 2024-04-02 | Stop reason: HOSPADM

## 2024-04-02 RX ORDER — LIDOCAINE HYDROCHLORIDE 10 MG/ML
0.5 INJECTION, SOLUTION EPIDURAL; INFILTRATION; INTRACAUDAL; PERINEURAL ONCE
Status: DISCONTINUED | OUTPATIENT
Start: 2024-04-02 | End: 2024-04-02 | Stop reason: HOSPADM

## 2024-04-02 RX ORDER — IBUPROFEN 800 MG/1
800 TABLET ORAL EVERY 8 HOURS PRN
Qty: 15 TABLET | Refills: 0 | Status: SHIPPED | OUTPATIENT
Start: 2024-04-02 | End: 2024-06-03

## 2024-04-02 RX ORDER — AMOXICILLIN 250 MG
1 CAPSULE ORAL 2 TIMES DAILY
Status: DISCONTINUED | OUTPATIENT
Start: 2024-04-02 | End: 2024-04-02 | Stop reason: HOSPADM

## 2024-04-02 RX ORDER — ONDANSETRON 8 MG/1
8 TABLET, ORALLY DISINTEGRATING ORAL EVERY 8 HOURS PRN
Status: DISCONTINUED | OUTPATIENT
Start: 2024-04-02 | End: 2024-04-02 | Stop reason: HOSPADM

## 2024-04-02 RX ORDER — TRANEXAMIC ACID 100 MG/ML
INJECTION, SOLUTION INTRAVENOUS
Status: DISCONTINUED | OUTPATIENT
Start: 2024-04-02 | End: 2024-04-02

## 2024-04-02 RX ORDER — SODIUM CHLORIDE, SODIUM LACTATE, POTASSIUM CHLORIDE, CALCIUM CHLORIDE 600; 310; 30; 20 MG/100ML; MG/100ML; MG/100ML; MG/100ML
INJECTION, SOLUTION INTRAVENOUS CONTINUOUS
Status: DISCONTINUED | OUTPATIENT
Start: 2024-04-02 | End: 2024-04-02 | Stop reason: HOSPADM

## 2024-04-02 RX ORDER — PROPOFOL 10 MG/ML
VIAL (ML) INTRAVENOUS
Status: DISCONTINUED | OUTPATIENT
Start: 2024-04-02 | End: 2024-04-02

## 2024-04-02 RX ORDER — ONDANSETRON HYDROCHLORIDE 2 MG/ML
INJECTION, SOLUTION INTRAVENOUS
Status: DISCONTINUED | OUTPATIENT
Start: 2024-04-02 | End: 2024-04-02

## 2024-04-02 RX ORDER — KETOROLAC TROMETHAMINE 30 MG/ML
30 INJECTION, SOLUTION INTRAMUSCULAR; INTRAVENOUS ONCE
Status: COMPLETED | OUTPATIENT
Start: 2024-04-02 | End: 2024-04-02

## 2024-04-02 RX ADMIN — FENTANYL CITRATE 50 MCG: 50 INJECTION, SOLUTION INTRAMUSCULAR; INTRAVENOUS at 07:04

## 2024-04-02 RX ADMIN — ACETAMINOPHEN 1000 MG: 10 INJECTION INTRAVENOUS at 08:04

## 2024-04-02 RX ADMIN — KETOROLAC TROMETHAMINE 30 MG: 30 INJECTION, SOLUTION INTRAMUSCULAR; INTRAVENOUS at 08:04

## 2024-04-02 RX ADMIN — LIDOCAINE HYDROCHLORIDE 80 MG: 20 INJECTION, SOLUTION INTRAVENOUS at 07:04

## 2024-04-02 RX ADMIN — SODIUM CHLORIDE, SODIUM LACTATE, POTASSIUM CHLORIDE, AND CALCIUM CHLORIDE: .6; .31; .03; .02 INJECTION, SOLUTION INTRAVENOUS at 08:04

## 2024-04-02 RX ADMIN — FAMOTIDINE 20 MG: 20 TABLET ORAL at 05:04

## 2024-04-02 RX ADMIN — ONDANSETRON HYDROCHLORIDE 4 MG: 2 INJECTION INTRAMUSCULAR; INTRAVENOUS at 07:04

## 2024-04-02 RX ADMIN — DEXAMETHASONE SODIUM PHOSPHATE 4 MG: 4 INJECTION, SOLUTION INTRAMUSCULAR; INTRAVENOUS at 07:04

## 2024-04-02 RX ADMIN — PROPOFOL 200 MG: 10 INJECTION, EMULSION INTRAVENOUS at 07:04

## 2024-04-02 RX ADMIN — OXYCODONE 5 MG: 5 TABLET ORAL at 08:04

## 2024-04-02 RX ADMIN — FENTANYL CITRATE 50 MCG: 50 INJECTION, SOLUTION INTRAMUSCULAR; INTRAVENOUS at 06:04

## 2024-04-02 RX ADMIN — TRANEXAMIC ACID 1000 MG: 100 INJECTION, SOLUTION INTRAVENOUS at 08:04

## 2024-04-02 RX ADMIN — LEVONORGESTREL 1 INTRA UTERINE DEVICE: 52 INTRAUTERINE DEVICE INTRAUTERINE at 07:04

## 2024-04-02 RX ADMIN — SODIUM CHLORIDE, SODIUM LACTATE, POTASSIUM CHLORIDE, AND CALCIUM CHLORIDE: .6; .31; .03; .02 INJECTION, SOLUTION INTRAVENOUS at 06:04

## 2024-04-02 NOTE — OP NOTE
[facility]  Operative Note    SUMMARY     Date of Procedure: 4/2/2024     Procedure: Procedure(s) (LRB):  MYOMECTOMY, HYSTEROSCOPIC (N/A)  INSERTION, INTRAUTERINE DEVICE (N/A)       Surgeon: Tg Smith M.D.    Assisting Surgeon: None    Pre-Operative Diagnosis: Abnormal uterine bleeding (AUB) [N93.9]    Post-Operative Diagnosis: Post-Op Diagnosis Codes:     * Abnormal uterine bleeding (AUB) [N93.9]    Anesthesia: General    Technical Procedures Used: Hysteroscopic myomectomy, Uterine D&C, Liletta IUD insertion (Lot #[id number], Exp 4/2027)    Description of the Findings of the Procedure: Patient was taken to the operating room where general anesthesia was performed and found to be adequate. She was prepped and draped in the normal sterile fashion in the dorsal lithotomy position in karol stirrups. Bladder was drained with straight catheter. Weighted speculum placed in the posterior vagina and the anterior vagina was elevated with percy retractor. The anterior lip of the cervix was grasped with the single tooth tenaculum. The uterus was then sounded and measured 12 cm in length. The dilators were used to dilate the cervix enough to allow introduction of the hysteroscope. The hysteroscope was introduced and the uterine cavity was visualized.  Bilateral tubal ostia were visualized.  Approximately 2 cm submucosal fibroid present at R lateral wall. The fibroid was injected with 10 cc dilute vasopressin.  The myosure reach followed by the myosure XL devices were then used to remove the visualized portion of the fibroid from the uterine cavity.   The uterine cavity was noted to be free of polyps or other anomalies. The hysteroscope was removed without difficulty.  Uterine curettage was performed.  Liletta device was prepared and placed at the uterine fundus.  Hemostasis present.  All instruments were then removed from the vagina. The patient tolerated procedure well. Sponge lap and needle  counts were correct x 2.  TXA x 1 was given at end of procedure.      Complications: No    Estimated Blood Loss (EBL): * 50 mL*  Fluid deficit:  1325 mL           Specimens:   Specimen (24h ago, onward)       Start     Ordered    04/02/24 0742  Specimen to Pathology, Surgery Gynecology and Obstetrics  Once        Comments: Pre-op Diagnosis: Abnormal uterine bleeding (AUB) [N93.9]Procedure(s):MYOMECTOMY, HYSTEROSCOPICINSERTION, INTRAUTERINE DEVICE Number of specimens: 2Name of specimens: 1. UTERINE FIBROID 2. ENDOMETRIAL CURRETTINGS     References:    Click here for ordering Quick Tip   Question Answer Comment   Procedure Type: Gynecology and Obstetrics    Specimen Class: Routine/Screening    Which provider would you like to cc? RACHEL KINNEY    Release to patient Immediate        04/02/24 0822                            Condition: Good    Disposition: PACU - hemodynamically stable.    Attestation: There was no qualified resident available for this procedure.

## 2024-04-02 NOTE — ANESTHESIA PROCEDURE NOTES
Intubation    Date/Time: 4/2/2024 7:03 AM    Performed by: Jung Yanez CRNA  Authorized by: Jung Yanez CRNA    Intubation:     Induction:  Intravenous    Intubated:  Postinduction    Mask Ventilation:  Easy mask    Attempts:  1    Attempted By:  CRNA    Difficult Airway Encountered?: No      Complications:  None    Airway Device:  Supraglottic airway/LMA    Airway Device Size:  4.0    Placement Verified By:  Capnometry    Complicating Factors:  None    Findings Post-Intubation:  BS equal bilateral and atraumatic/condition of teeth unchanged

## 2024-04-02 NOTE — ANESTHESIA PREPROCEDURE EVALUATION
04/02/2024  Mary Kate Lima is a 48 y.o., female.      Pre-op Assessment    I have reviewed the Patient Summary Reports.     I have reviewed the Nursing Notes. I have reviewed the NPO Status.   I have reviewed the Medications.     Review of Systems  Anesthesia Hx:  No problems with previous Anesthesia             Denies Family Hx of Anesthesia complications.    Denies Personal Hx of Anesthesia complications.                    Social:  Non-Smoker       Hematology/Oncology:  Hematology Normal   Oncology Normal                                   Cardiovascular:  Cardiovascular Normal Exercise tolerance: good                                           Pulmonary:  Pulmonary Normal                       Renal/:  Renal/ Normal                 Hepatic/GI:  Hepatic/GI Normal                 Musculoskeletal:  Musculoskeletal Normal                Neurological:  Neurology Normal                                      Endocrine:  Endocrine Normal            Psych:   anxiety               Physical Exam  General: Well nourished and Cooperative    Airway:  Mallampati: II   Mouth Opening: Normal  TM Distance: Normal  Neck ROM: Normal ROM    Dental:  Intact    Anesthesia Plan  Type of Anesthesia, risks & benefits discussed:    Anesthesia Type: Gen Supraglottic Airway  Intra-op Monitoring Plan: Standard ASA Monitors  Post Op Pain Control Plan: multimodal analgesia  Induction:  IV  Airway Plan: Video  Informed Consent: Informed consent signed with the Patient and all parties understand the risks and agree with anesthesia plan.  All questions answered.   ASA Score: 1  Day of Surgery Review of History & Physical: H&P Update referred to the surgeon/provider.    Ready For Surgery From Anesthesia Perspective.   .       ----- Message from Juan J Arauz MD sent at 2/3/2021 11:54 AM CST -----  Regarding: Labs  Hi Mayi,     We are planning to have her repeat her vasculitis labs in conjunction with her labs for Heme in April Can you help set this up?    We want usual vasculitis labs (including CD19 and PR3/MPO).     Thanks so much!!!    Best,   HI

## 2024-04-02 NOTE — DISCHARGE INSTRUCTIONS
Home Care Instruction Hysteroscopy             ACTIVITY LEVEL:  If you received sedation and/or an anesthetic, you may feel sleepy for several hours. Rest until you feel more  awake. Gradually resume your normal activities.    DIET:  At home, continue with liquids. If there is no nausea, you may eat a soft diet and gradually resume a regular diet.    BATHING:  You may shower  as desired in one day.  You should avoid tub baths, hot tubs and swimming pools until seen by your physician for a post-op follow up.    CARE:  You can expect watery or bloody vaginal discharge for several days. Do not use tampons, please only use pads. Sexual activity is restricted as advised by your doctor.    MEDICATIONS:  You will receive instructions for any pain and/or antibiotic prescriptions. Pain medication should be taken only if needed and as directed. Antibiotics, if ordered, should be taken as directed until the entire prescription is completed.    WHEN TO CALL THE DOCTOR:   For any heavy vaginal bleeding   Fever over 101°F (38.4°C)   Any lower abdominal pain not relieved by the pain mediation

## 2024-04-02 NOTE — ANESTHESIA POSTPROCEDURE EVALUATION
Anesthesia Post Evaluation    Patient: Mary Kate Lima    Procedure(s) Performed: Procedure(s) (LRB):  MYOMECTOMY, HYSTEROSCOPIC (N/A)  INSERTION, INTRAUTERINE DEVICE (N/A)    Final Anesthesia Type: general      Patient location during evaluation: PACU  Patient participation: Yes- Able to Participate  Level of consciousness: awake and alert  Post-procedure vital signs: reviewed and stable  Pain management: adequate  Airway patency: patent    PONV status at discharge: No PONV  Anesthetic complications: no      Cardiovascular status: blood pressure returned to baseline  Respiratory status: unassisted, spontaneous ventilation and room air  Hydration status: euvolemic  Follow-up not needed.          Vitals Value Taken Time   BP 96/53 04/02/24 1015   Temp 36.4 °C (97.6 °F) 04/02/24 0915   Pulse 67 04/02/24 1015   Resp 16 04/02/24 1015   SpO2 94 % 04/02/24 1015         Event Time   Out of Recovery 09:11:00         Pain/Yesi Score: Pain Rating Prior to Med Admin: 4 (4/2/2024  8:43 AM)  Pain Rating Post Med Admin: 4 (4/2/2024  9:00 AM)  Yesi Score: 10 (4/2/2024 10:15 AM)

## 2024-04-02 NOTE — TRANSFER OF CARE
"Anesthesia Transfer of Care Note    Patient: Mary Kate Lima    Procedure(s) Performed: Procedure(s) (LRB):  MYOMECTOMY, HYSTEROSCOPIC (N/A)  INSERTION, INTRAUTERINE DEVICE (N/A)    Patient location: PACU    Anesthesia Type: general    Transport from OR: Transported from OR on 6-10 L/min O2 by face mask with adequate spontaneous ventilation    Post pain: adequate analgesia    Post assessment: no apparent anesthetic complications and tolerated procedure well    Post vital signs: stable    Level of consciousness: awake and alert    Nausea/Vomiting: no nausea/vomiting    Complications: none    Transfer of care protocol was followed      Last vitals: Visit Vitals  /72 (BP Location: Left arm, Patient Position: Lying)   Pulse 97   Temp 36.1 °C (97 °F) (Temporal)   Resp 16   Ht 5' 6" (1.676 m)   Wt 61.2 kg (135 lb)   LMP 03/08/2024 (Approximate)   SpO2 98%   Breastfeeding No   BMI 21.79 kg/m²     "

## 2024-04-02 NOTE — PLAN OF CARE
Mary Kate Lima has met all discharge criteria from Phase II. Vital Signs are stable, ambulating  without difficulty. Discharge instructions given, patient verbalized understanding. Awaiting arrival of discharge medications from pharmacy.

## 2024-04-03 VITALS
HEART RATE: 67 BPM | RESPIRATION RATE: 16 BRPM | DIASTOLIC BLOOD PRESSURE: 53 MMHG | HEIGHT: 66 IN | OXYGEN SATURATION: 94 % | BODY MASS INDEX: 21.69 KG/M2 | SYSTOLIC BLOOD PRESSURE: 96 MMHG | TEMPERATURE: 98 F | WEIGHT: 135 LBS

## 2024-04-04 ENCOUNTER — TELEPHONE (OUTPATIENT)
Dept: OBSTETRICS AND GYNECOLOGY | Facility: CLINIC | Age: 49
End: 2024-04-04
Payer: COMMERCIAL

## 2024-04-04 NOTE — DISCHARGE SUMMARY
[facility] ([place])  Discharge Summary  OBGYN    Admission date: 4/2/2024  Discharge date: 4/2/2024    Admit Dx:      Patient Active Problem List   Diagnosis    Abnormal uterine bleeding (AUB)    Depression, major, recurrent, mild    CATALINA (iron deficiency anemia)    Anxiety     Discharge Dx:    Patient Active Problem List   Diagnosis    Abnormal uterine bleeding (AUB)    Depression, major, recurrent, mild    CATALINA (iron deficiency anemia)    Anxiety       Attending Physician: Tg Smith   Discharge Provider: Tg Smith    Procedures Performed: Procedure(s) (LRB):  MYOMECTOMY, HYSTEROSCOPIC (N/A)  INSERTION, INTRAUTERINE DEVICE (N/A)    Hospital Course: Patient was admitted on 4/2/2024 to undergo hysteroscopic myomectomy and IUD insertion secondary to AUB. Procedure was uncomplicated, for full details see operative report. Barring any unforseen incidents, patient will be discharged home when she is able to ambulate, void, and tolerate PO intake.    Consults: none    Significant Diagnostic Studies:     Discharged Condition: stable    Disposition: Home    Follow Up: 2 weeks      Medications:  Discharge Medication List as of 4/2/2024  9:11 AM        START taking these medications    Details   ibuprofen (ADVIL,MOTRIN) 800 MG tablet Take 1 tablet (800 mg total) by mouth every 8 (eight) hours as needed for Pain., Starting Tue 4/2/2024, Normal      oxyCODONE-acetaminophen (PERCOCET) 5-325 mg per tablet Take 1 tablet by mouth every 4 (four) hours as needed for Pain., Starting Tue 4/2/2024, Normal           CONTINUE these medications which have NOT CHANGED    Details   ALPRAZolam (XANAX) 0.25 MG tablet Take 1 tablet (0.25 mg total) by mouth daily as needed for Anxiety (anxiety, panic attack)., Starting Mon 11/6/2023, Until Tue 4/2/2024 at 2359, Normal      buPROPion (WELLBUTRIN XL) 300 MG 24 hr tablet Take 1 tablet (300 mg total) by mouth once daily., Starting Mon 11/6/2023, Until Tue 11/5/2024,  Normal      CHRISTIANO 1.5-30 mg-mcg Tab Take 1 tablet by mouth., Starting Fri 3/15/2024, Historical Med      traZODone (DESYREL) 50 MG tablet Take 1 tablet (50 mg total) by mouth every evening., Starting Mon 11/6/2023, Until Tue 11/5/2024, Normal      busPIRone (BUSPAR) 10 MG tablet Take 1 tablet (10 mg total) by mouth 2 (two) times daily., Starting Mon 11/6/2023, Until Tue 11/5/2024, Normal             Discharge Procedure Orders   Diet general     No dressing needed     Call MD for:  temperature >100.4     Call MD for:  persistent nausea and vomiting     Call MD for:  severe uncontrolled pain     Call MD for:  difficulty breathing, headache or visual disturbances     Call MD for:  redness, tenderness, or signs of infection (pain, swelling, redness, odor or green/yellow discharge around incision site)     Call MD for:  hives     Call MD for:  persistent dizziness or light-headedness     Call MD for:  extreme fatigue     Call MD for:     Activity as tolerated

## 2024-04-11 LAB
FINAL PATHOLOGIC DIAGNOSIS: NORMAL
GROSS: NORMAL
Lab: NORMAL

## 2024-04-15 ENCOUNTER — OFFICE VISIT (OUTPATIENT)
Dept: OBSTETRICS AND GYNECOLOGY | Facility: CLINIC | Age: 49
End: 2024-04-15
Attending: STUDENT IN AN ORGANIZED HEALTH CARE EDUCATION/TRAINING PROGRAM
Payer: COMMERCIAL

## 2024-04-15 VITALS
HEIGHT: 66 IN | SYSTOLIC BLOOD PRESSURE: 110 MMHG | WEIGHT: 139.25 LBS | BODY MASS INDEX: 22.38 KG/M2 | DIASTOLIC BLOOD PRESSURE: 74 MMHG

## 2024-04-15 DIAGNOSIS — Z98.890 POST-OPERATIVE STATE: Primary | ICD-10-CM

## 2024-04-15 PROCEDURE — 99999 PR PBB SHADOW E&M-EST. PATIENT-LVL II: CPT | Mod: PBBFAC,,, | Performed by: STUDENT IN AN ORGANIZED HEALTH CARE EDUCATION/TRAINING PROGRAM

## 2024-04-15 PROCEDURE — 3074F SYST BP LT 130 MM HG: CPT | Mod: CPTII,S$GLB,, | Performed by: STUDENT IN AN ORGANIZED HEALTH CARE EDUCATION/TRAINING PROGRAM

## 2024-04-15 PROCEDURE — 3078F DIAST BP <80 MM HG: CPT | Mod: CPTII,S$GLB,, | Performed by: STUDENT IN AN ORGANIZED HEALTH CARE EDUCATION/TRAINING PROGRAM

## 2024-04-15 PROCEDURE — 99024 POSTOP FOLLOW-UP VISIT: CPT | Mod: S$GLB,,, | Performed by: STUDENT IN AN ORGANIZED HEALTH CARE EDUCATION/TRAINING PROGRAM

## 2024-04-15 RX ORDER — DIAZEPAM 10 MG/1
TABLET ORAL
COMMUNITY
Start: 2024-04-09 | End: 2024-06-03

## 2024-04-15 RX ORDER — AMOXICILLIN AND CLAVULANATE POTASSIUM 875; 125 MG/1; MG/1
1 TABLET, FILM COATED ORAL EVERY 12 HOURS
COMMUNITY
Start: 2024-04-09 | End: 2024-06-03

## 2024-04-15 NOTE — PROGRESS NOTES
"Subjective:       Mary Kate Lima is a 48 y.o. [id number] presents to the clinic 2 weeks status post  hysteroscopic myomectomy and iud insertion  for abnormal uterine bleeding. Eating a regular diet without difficulty. Bowel movements are normal. Some cramping and spotting - has improved.  Continued ocp cascade.      The patient's allergies, and past medical and surgical histories were reviewed.    Review of Systems  Pertinent items are noted in HPI.      Objective:      /74   Ht 5' 6" (1.676 m)   Wt 63.2 kg (139 lb 3.5 oz)   LMP 2024 (Approximate)   BMI 22.47 kg/m²     APPEARANCE: Well nourished, well developed, in no acute distress.  PSYCH: Appropriate mood and affect.  NECK:  Supple, no thyromegaly.  BREASTS:  No masses, no nipple discharge.  CARDIOVASCULAR:  Regular rate and rhythm.  LUNGS:  Clear to auscultation bilaterally.  ABDOMEN:  Soft, nontender.   GENITOURINARY:  External genitalia normal in appearance, normal perineal body, normal urethral meatus.  Vagina with scant discharge, no blood.  Cervix without lesion, +IUD strings.  Uterus mobile and nontender. Adnexa without masses or TTP.    EXTREMITIES: No edema.    Pathology:    1. UTERUS, HYSTEROSCOPIC MYOMECTOMY:  -  Submucosal leiomyoma.  -  Focal benign endometrium with exogenous progestin effect.  -  No evidence of atypia or malignancy.      2. ENDOMETRIAL CURETTAGE:  -  Polypoid fragments of endometrium with exogenous progestin effect and focal tubal metaplasia  -  No evidence of atypia, dysplasia, endometrial hyperplasia, or malignancy.          Assessment:      Doing well postoperatively.  Operative findings again reviewed. Pathology report discussed.      Plan:      1. Continue any current medications.  2. Wound care discussed.  3. Activity restrictions: none  4. Anticipated return to work: now.  5. Follow up:in 4 weeks     "

## 2024-04-22 ENCOUNTER — PATIENT MESSAGE (OUTPATIENT)
Dept: OBSTETRICS AND GYNECOLOGY | Facility: CLINIC | Age: 49
End: 2024-04-22
Payer: COMMERCIAL

## 2024-04-22 DIAGNOSIS — N93.9 ABNORMAL UTERINE BLEEDING (AUB): Primary | ICD-10-CM

## 2024-04-23 ENCOUNTER — OFFICE VISIT (OUTPATIENT)
Dept: OBSTETRICS AND GYNECOLOGY | Facility: CLINIC | Age: 49
End: 2024-04-23
Attending: STUDENT IN AN ORGANIZED HEALTH CARE EDUCATION/TRAINING PROGRAM
Payer: COMMERCIAL

## 2024-04-23 ENCOUNTER — TELEPHONE (OUTPATIENT)
Dept: OBSTETRICS AND GYNECOLOGY | Facility: CLINIC | Age: 49
End: 2024-04-23

## 2024-04-23 VITALS
DIASTOLIC BLOOD PRESSURE: 82 MMHG | BODY MASS INDEX: 22.35 KG/M2 | WEIGHT: 138.44 LBS | SYSTOLIC BLOOD PRESSURE: 120 MMHG

## 2024-04-23 DIAGNOSIS — D21.9 LEIOMYOMA: ICD-10-CM

## 2024-04-23 DIAGNOSIS — N93.9 ABNORMAL UTERINE BLEEDING (AUB): Primary | ICD-10-CM

## 2024-04-23 PROCEDURE — 99213 OFFICE O/P EST LOW 20 MIN: CPT | Mod: 25,57,S$GLB, | Performed by: STUDENT IN AN ORGANIZED HEALTH CARE EDUCATION/TRAINING PROGRAM

## 2024-04-23 PROCEDURE — 3008F BODY MASS INDEX DOCD: CPT | Mod: CPTII,S$GLB,, | Performed by: STUDENT IN AN ORGANIZED HEALTH CARE EDUCATION/TRAINING PROGRAM

## 2024-04-23 PROCEDURE — 1159F MED LIST DOCD IN RCRD: CPT | Mod: CPTII,S$GLB,, | Performed by: STUDENT IN AN ORGANIZED HEALTH CARE EDUCATION/TRAINING PROGRAM

## 2024-04-23 PROCEDURE — 3074F SYST BP LT 130 MM HG: CPT | Mod: CPTII,S$GLB,, | Performed by: STUDENT IN AN ORGANIZED HEALTH CARE EDUCATION/TRAINING PROGRAM

## 2024-04-23 PROCEDURE — 99999 PR PBB SHADOW E&M-EST. PATIENT-LVL III: CPT | Mod: PBBFAC,,, | Performed by: STUDENT IN AN ORGANIZED HEALTH CARE EDUCATION/TRAINING PROGRAM

## 2024-04-23 PROCEDURE — 58301 REMOVE INTRAUTERINE DEVICE: CPT | Mod: S$GLB,,, | Performed by: STUDENT IN AN ORGANIZED HEALTH CARE EDUCATION/TRAINING PROGRAM

## 2024-04-23 PROCEDURE — 3079F DIAST BP 80-89 MM HG: CPT | Mod: CPTII,S$GLB,, | Performed by: STUDENT IN AN ORGANIZED HEALTH CARE EDUCATION/TRAINING PROGRAM

## 2024-04-23 RX ORDER — TRANEXAMIC ACID 650 MG/1
1300 TABLET ORAL 3 TIMES DAILY
Qty: 30 TABLET | Refills: 0 | Status: SHIPPED | OUTPATIENT
Start: 2024-04-23 | End: 2024-04-28

## 2024-04-23 NOTE — TELEPHONE ENCOUNTER
Requested Date:  5/21    Requested Time:  1000    Case Length:120 minutes    Surgeon: Tg Smith      Assistant Surgeon: Kathy   Visit Type: Outpatient    LOCATION: [facility]    PROCEDURE:tlh, bs, cysto  Diagnosis:aub, uterine fibroids  Anesthesia type: General   Comments/Special Equipment Needed:  Rep needed: No  Does the patient need a PreOp appointment with MD: Yes  U/S needed at pre-op: No  PCP clearance: Not Needed  When does she need her Post op appointment: 2 weeks in person  Do you need additional time blocked out from clinic? No  If so, what time do you want to be back in clinic? na  When can the patient go back to work? four weeks

## 2024-04-23 NOTE — PROCEDURES
Removal of IUD    Date/Time: 4/23/2024 11:00 AM    Performed by: Tg Smith MD  Authorized by: Tg Smith MD    Consent obtained:  Prior to procedure the appropriate consent was completed and verified  Consent given by:  Patient  Procedure risks and benefits discussed: yes    Patient questions answered: yes    Patient agrees, verbalizes understanding, and wants to proceed: yes    Instructions and paperwork completed: yes    Patient states understanding of procedure being performed: yes    Relevant documents present and verified: yes    Test results available and properly labeled: yes    Imaging studies available: yes    Implant grasped by: ring forceps  Removed with no complications: yes

## 2024-04-23 NOTE — PROGRESS NOTES
Chief Complaint: U/S Results     HPI:      Mary Kate Lima is a 48 y.o.  who presents today for follow up of U/S results.  LMP: Patient's last menstrual period was 2024 (approximate).  Had hysteroscopic myomectomy and iud placement 2+ weeks ago.  Continued cramping and bleeding.  U/S today with IUD in lower uterine segment.  Patient has continued ocp cascade - on 1 pill daily.  Still with symptoms.  No other issues, problems, or complaints.     OB History          3    Para   3    Term   3            AB        Living   3         SAB        IAB        Ectopic        Multiple        Live Births   3           Obstetric Comments   Menarche age 14             Past Medical History:   Diagnosis Date    Abnormal Pap smear of cervix      Past Surgical History:   Procedure Laterality Date     SECTION      x3    ELBOW SURGERY Right     HEMORRHOID SURGERY      HYSTEROSCOPIC RESECTION OF MYOMA N/A 2024    Procedure: MYOMECTOMY, HYSTEROSCOPIC;  Surgeon: Tg Smith MD;  Location: [facility] OR;  Service: OB/GYN;  Laterality: N/A;    INTRAUTERINE DEVICE INSERTION N/A 2024    Procedure: INSERTION, INTRAUTERINE DEVICE;  Surgeon: Tg Smith MD;  Location: [facility];  Service: OB/GYN;  Laterality: N/A;    TONSILLECTOMY  78    TUBAL LIGATION       Social History     Socioeconomic History    Marital status:    Tobacco Use    Smoking status: Never    Smokeless tobacco: Never   Substance and Sexual Activity    Alcohol use: Yes     Alcohol/week: 4.0 standard drinks of alcohol     Types: 1 Glasses of wine, 3 Shots of liquor per week     Comment: socially    Drug use: Never    Sexual activity: Yes     Partners: Male     Birth control/protection: See Surgical Hx, Other-see comments     Comment: Tubal ligation     Social Determinants of Health     Financial Resource Strain: Low Risk  (2023)    Overall Financial Resource Strain (CARDIA)     Difficulty of Paying Living  Expenses: Not hard at all   Food Insecurity: No Food Insecurity (12/18/2023)    Hunger Vital Sign     Worried About Running Out of Food in the Last Year: Never true     Ran Out of Food in the Last Year: Never true   Transportation Needs: No Transportation Needs (12/18/2023)    PRAPARE - Transportation     Lack of Transportation (Medical): No     Lack of Transportation (Non-Medical): No   Physical Activity: Sufficiently Active (12/18/2023)    Exercise Vital Sign     Days of Exercise per Week: 6 days     Minutes of Exercise per Session: 70 min   Stress: Stress Concern Present (12/18/2023)    [place] [place] [employer] - Occupational Stress Questionnaire     Feeling of Stress : Very much   Social Connections: Unknown (12/18/2023)    Social Connection and Isolation Panel [NHANES]     Frequency of Communication with Friends and Family: More than three times a week     Frequency of Social Gatherings with Friends and Family: Twice a week     Active Member of Clubs or Organizations: No     Attends Club or Organization Meetings: Patient declined     Marital Status:    Housing Stability: Low Risk  (12/18/2023)    Housing Stability Vital Sign     Unable to Pay for Housing in the Last Year: No     Number of Places Lived in the Last Year: 1     Unstable Housing in the Last Year: No     Family History   Problem Relation Name Age of Onset    Breast cancer Paternal Grandmother      Breast cancer Paternal Aunt      Diabetes Maternal Grandmother Yanira     Colon cancer Neg Hx      Ovarian cancer Neg Hx         Current Outpatient Medications:     amoxicillin-clavulanate 875-125mg (AUGMENTIN) 875-125 mg per tablet, Take 1 tablet by mouth every 12 (twelve) hours., Disp: , Rfl:     buPROPion (WELLBUTRIN XL) 300 MG 24 hr tablet, Take 1 tablet (300 mg total) by mouth once daily., Disp: 30 tablet, Rfl: 11    traZODone (DESYREL) 50 MG tablet, Take 1 tablet (50 mg total) by mouth every evening., Disp: 30 tablet, Rfl: 11     ALPRAZolam (XANAX) 0.25 MG tablet, Take 1 tablet (0.25 mg total) by mouth daily as needed for Anxiety (anxiety, panic attack)., Disp: 10 tablet, Rfl: 0    busPIRone (BUSPAR) 10 MG tablet, Take 1 tablet (10 mg total) by mouth 2 (two) times daily. (Patient not taking: Reported on 4/23/2024), Disp: 60 tablet, Rfl: 11    diazePAM (VALIUM) 10 MG Tab, Take by mouth. (Patient not taking: Reported on 4/23/2024), Disp: , Rfl:     CHRISTIANO 1.5-30 mg-mcg Tab, Take 1 tablet by mouth., Disp: , Rfl:     ibuprofen (ADVIL,MOTRIN) 800 MG tablet, Take 1 tablet (800 mg total) by mouth every 8 (eight) hours as needed for Pain. (Patient not taking: Reported on 4/23/2024), Disp: 15 tablet, Rfl: 0    oxyCODONE-acetaminophen (PERCOCET) 5-325 mg per tablet, Take 1 tablet by mouth every 4 (four) hours as needed for Pain. (Patient not taking: Reported on 4/23/2024), Disp: 5 tablet, Rfl: 0    tranexamic acid (LYSTEDA) 650 mg tablet, Take 2 tablets (1,300 mg total) by mouth 3 (three) times daily. for 5 days, Disp: 30 tablet, Rfl: 0  No current facility-administered medications for this visit.    Facility-Administered Medications Ordered in Other Visits:     dextrose 5 % in lactated ringers infusion, , Intravenous, Continuous, Tg Smith MD    ROS:     GENERAL: Denies unintentional weight gain or weight loss. Feeling well overall.   SKIN: Denies rash or lesions.   HEENT: Denies headaches, or vision changes.   ABDOMEN: Denies abdominal pain, constipation, diarrhea, nausea, vomiting, change in appetite.  URINARY: Denies frequency, dysuria, hematuria.  NEUROLOGIC: Denies syncope or weakness.   PSYCHIATRIC: Denies depression, anxiety or mood swings.    Physical Exam:      PHYSICAL EXAM:  /82   Wt 62.8 kg (138 lb 7.2 oz)   LMP 03/08/2024 (Approximate)   BMI 22.35 kg/m²   Body mass index is 22.35 kg/m².     APPEARANCE: Well nourished, well developed, in no acute distress.  PSYCH: Appropriate mood and affect.  :  External genitalia  normal in apperance, vagina with bloody discharge, + IUD strings, cervix and uterus without lesions and nontender  SKIN: No acne or hirsutism.      Assessment/Plan:     48 y.o.     Abnormal uterine bleeding (AUB)    Leiomyoma    Other orders  -     tranexamic acid (LYSTEDA) 650 mg tablet; Take 2 tablets (1,300 mg total) by mouth 3 (three) times daily. for 5 days  Dispense: 30 tablet; Refill: 0          Counselin.  AUB, uterine fibroids.  IUD in lower uterine segment today.  Pulled.  Will also switch to txa to stop bleeding.  R/b/a reviewed and possible side effects.  Also discussed ci.  Reviewed further options.  She desires to proceed with lth.  R/b/a reviewed and all questions answered.  Will set up for pre op and surgery date.  She is in agreemetn with plan. RTC for pre op.

## 2024-04-24 ENCOUNTER — ANESTHESIA EVENT (OUTPATIENT)
Dept: SURGERY | Facility: OTHER | Age: 49
End: 2024-04-24
Payer: COMMERCIAL

## 2024-04-24 ENCOUNTER — TELEPHONE (OUTPATIENT)
Dept: OBSTETRICS AND GYNECOLOGY | Facility: CLINIC | Age: 49
End: 2024-04-24
Payer: COMMERCIAL

## 2024-04-25 ENCOUNTER — PATIENT MESSAGE (OUTPATIENT)
Dept: OBSTETRICS AND GYNECOLOGY | Facility: CLINIC | Age: 49
End: 2024-04-25
Payer: COMMERCIAL

## 2024-04-26 ENCOUNTER — TELEPHONE (OUTPATIENT)
Dept: OBSTETRICS AND GYNECOLOGY | Facility: CLINIC | Age: 49
End: 2024-04-26
Payer: COMMERCIAL

## 2024-04-26 NOTE — TELEPHONE ENCOUNTER
4/26/24 @ 1357  Spoke with pt. She is ok with talking Monday regarding surgery, Has to reschedule with [facility] due to a work meeting. But she is happy to see them. Instructed pt I would let Dr Smith know. Pt VU        ----- Message from Tg Smith MD sent at 4/26/2024  1:02 PM CDT -----  Regarding: FW: HRT appt  Please let patient know:  I will be back in the office on Monday and we can further discuss surgery dates at that time if that works for her.  I also heard back from the HRT clinic and she should keep her appt on Monday.  They are happy to see her to establish care and get things started.  Thank you so much!  ----- Message -----  From: Porsche Thapa NP  Sent: 4/26/2024  12:46 PM CDT  To: Tg Smith MD  Subject: RE: HRT appt                                     Ok to see and at least get established  ----- Message -----  From: Tg Smith MD  Sent: 4/24/2024   1:08 PM CDT  To: Porsche Thapa NP  Subject: HRT appt                                         Hi there,  She is scheduled to see yall on Monday.  We have been struggling with aub due to fibroids.  I tried to remove some of the fibroid hysteroscopically and place an iud but the iud moved down so it is now out.  Have her on TXA and setting her up for hysterectomy in May.  She would love to see yall - should she still come in on Monday or better to get the bleeding stuff taken care of first?  Thanks for all you do!  -Tg

## 2024-04-26 NOTE — TELEPHONE ENCOUNTER
Called and spoke to patient.  Patient informed of Monday's visit kept on Monday.  Also, that you were in contact with HRT clinic and they are elated to establish care and get things started. Patient expressed understanding & sends her thanks!    Dory

## 2024-04-26 NOTE — TELEPHONE ENCOUNTER
----- Message from Tg Smith MD sent at 4/26/2024  1:02 PM CDT -----  Regarding: FW: HRT appt  Please let patient know:  I will be back in the office on Monday and we can further discuss surgery dates at that time if that works for her.  I also heard back from the HRT clinic and she should keep her appt on Monday.  They are happy to see her to establish care and get things started.  Thank you so much!  ----- Message -----  From: Porsche Thapa NP  Sent: 4/26/2024  12:46 PM CDT  To: Tg Smith MD  Subject: RE: HRT appt                                     Ok to see and at least get established  ----- Message -----  From: Tg Smith MD  Sent: 4/24/2024   1:08 PM CDT  To: Porsche Thapa NP  Subject: HRT appt                                         Hi there,  She is scheduled to see nic on Monday.  We have been struggling with aub due to fibroids.  I tried to remove some of the fibroid hysteroscopically and place an iud but the iud moved down so it is now out.  Have her on TXA and setting her up for hysterectomy in May.  She would love to see yall - should she still come in on Monday or better to get the bleeding stuff taken care of first?  Thanks for all you do!  -Tg

## 2024-04-30 ENCOUNTER — TELEPHONE (OUTPATIENT)
Dept: OBSTETRICS AND GYNECOLOGY | Facility: CLINIC | Age: 49
End: 2024-04-30
Payer: COMMERCIAL

## 2024-04-30 NOTE — TELEPHONE ENCOUNTER
I contacted patient to schedule patient informed me that she was already scheduled confiremed appointment. With Porsche for a hormone Consult.      Esha

## 2024-04-30 NOTE — TELEPHONE ENCOUNTER
Spoke with patient and all questions answered.  Keep scheduled follow up.  Wants to keep May surgery date.

## 2024-04-30 NOTE — TELEPHONE ENCOUNTER
----- Message from Estefani Chauhan MA sent at 4/29/2024  9:37 AM CDT -----  Follow up and schedule hrt consult

## 2024-05-02 ENCOUNTER — OFFICE VISIT (OUTPATIENT)
Dept: PRIMARY CARE CLINIC | Facility: CLINIC | Age: 49
End: 2024-05-02
Payer: COMMERCIAL

## 2024-05-02 VITALS
HEART RATE: 78 BPM | DIASTOLIC BLOOD PRESSURE: 68 MMHG | OXYGEN SATURATION: 98 % | SYSTOLIC BLOOD PRESSURE: 102 MMHG | BODY MASS INDEX: 22.42 KG/M2 | WEIGHT: 138.88 LBS

## 2024-05-02 DIAGNOSIS — R22.32 MASS OF ARM, LEFT: Primary | ICD-10-CM

## 2024-05-02 PROCEDURE — 99999 PR PBB SHADOW E&M-EST. PATIENT-LVL IV: CPT | Mod: PBBFAC,,, | Performed by: STUDENT IN AN ORGANIZED HEALTH CARE EDUCATION/TRAINING PROGRAM

## 2024-05-02 PROCEDURE — 3078F DIAST BP <80 MM HG: CPT | Mod: CPTII,S$GLB,, | Performed by: STUDENT IN AN ORGANIZED HEALTH CARE EDUCATION/TRAINING PROGRAM

## 2024-05-02 PROCEDURE — 99214 OFFICE O/P EST MOD 30 MIN: CPT | Mod: S$GLB,,, | Performed by: STUDENT IN AN ORGANIZED HEALTH CARE EDUCATION/TRAINING PROGRAM

## 2024-05-02 PROCEDURE — 1159F MED LIST DOCD IN RCRD: CPT | Mod: CPTII,S$GLB,, | Performed by: STUDENT IN AN ORGANIZED HEALTH CARE EDUCATION/TRAINING PROGRAM

## 2024-05-02 PROCEDURE — 3074F SYST BP LT 130 MM HG: CPT | Mod: CPTII,S$GLB,, | Performed by: STUDENT IN AN ORGANIZED HEALTH CARE EDUCATION/TRAINING PROGRAM

## 2024-05-02 PROCEDURE — 1160F RVW MEDS BY RX/DR IN RCRD: CPT | Mod: CPTII,S$GLB,, | Performed by: STUDENT IN AN ORGANIZED HEALTH CARE EDUCATION/TRAINING PROGRAM

## 2024-05-02 PROCEDURE — 3008F BODY MASS INDEX DOCD: CPT | Mod: CPTII,S$GLB,, | Performed by: STUDENT IN AN ORGANIZED HEALTH CARE EDUCATION/TRAINING PROGRAM

## 2024-05-05 NOTE — PROGRESS NOTES
INTERNAL MEDICINE SAME DAY PRIMARY CARE VISIT NOTE    Subjective:     Chief Complaint: Mass       Patient ID: Mary Kate Lima is a 48 y.o. female, here today for focused same-day primary care visit.    PCP: Dr. Mancilla    Patient presents today to discuss L arm bump. Noticed in the past week in medial arm. No injuries to the area. No redness, pain, warmth, or swelling.      Past Medical History:  Past Medical History:   Diagnosis Date    Abnormal Pap smear of cervix        Home Medications:  Prior to Admission medications    Medication Sig Start Date End Date Taking? Authorizing Provider   ALPRAZolam (XANAX) 0.25 MG tablet Take 1 tablet (0.25 mg total) by mouth daily as needed for Anxiety (anxiety, panic attack). 11/6/23 4/2/24  Deepti Lopez MD   amoxicillin-clavulanate 875-125mg (AUGMENTIN) 875-125 mg per tablet Take 1 tablet by mouth every 12 (twelve) hours. 4/9/24   Provider, Historical   buPROPion (WELLBUTRIN XL) 300 MG 24 hr tablet Take 1 tablet (300 mg total) by mouth once daily. 11/6/23 11/5/24  Deepti Lopez MD   busPIRone (BUSPAR) 10 MG tablet Take 1 tablet (10 mg total) by mouth 2 (two) times daily.  Patient not taking: Reported on 4/23/2024 11/6/23 11/5/24  Deepti Lopez MD   diazePAM (VALIUM) 10 MG Tab Take by mouth.  Patient not taking: Reported on 4/23/2024 4/9/24   Provider, Historical   ibuprofen (ADVIL,MOTRIN) 800 MG tablet Take 1 tablet (800 mg total) by mouth every 8 (eight) hours as needed for Pain.  Patient not taking: Reported on 4/23/2024 4/2/24   Tg Smith MD   oxyCODONE-acetaminophen (PERCOCET) 5-325 mg per tablet Take 1 tablet by mouth every 4 (four) hours as needed for Pain.  Patient not taking: Reported on 4/23/2024 4/2/24   Tg Smith MD   traZODone (DESYREL) 50 MG tablet Take 1 tablet (50 mg total) by mouth every evening. 11/6/23 11/5/24  Deepti Lopez MD       Allergies:  Review of patient's allergies indicates:  No Known  Allergies    Social History:  Social History     Tobacco Use    Smoking status: Never    Smokeless tobacco: Never   Substance Use Topics    Alcohol use: Yes     Alcohol/week: 4.0 standard drinks of alcohol     Types: 1 Glasses of wine, 3 Shots of liquor per week     Comment: socially    Drug use: Never         Review of Systems   Constitutional:  Negative for diaphoresis, fatigue and fever.   HENT:  Negative for congestion, ear pain and voice change.    Eyes:  Negative for discharge, redness and itching.   Respiratory:  Negative for shortness of breath and wheezing.    Cardiovascular:  Negative for chest pain.   Gastrointestinal:  Negative for abdominal pain.   Musculoskeletal:  Negative for gait problem and joint swelling.   Skin:  Negative for color change, pallor, rash and wound.   Neurological:  Negative for weakness.           Objective:   /68 (BP Location: Right arm)   Pulse 78   Wt 63 kg (138 lb 14.2 oz)   LMP 03/08/2024 (Approximate)   SpO2 98%   BMI 22.42 kg/m²        General: AAO x3, no apparent distress  HEENT: PERRL, OP clear  CV: RRR, no m/r/g  Pulm: Lungs CTAB, no crackles, no wheezes  Abd: s/NT/ND +BS  Extremities: Small, mobile, 1 cm subcutaneous lesion    Labs:         Assessment/Plan     Mary Kate was seen today for mass.    Diagnoses and all orders for this visit:    Mass of arm, left  -     US Soft Tissue Upper Extremity, Left; Future    Suspect lipoma based on physical exam, follow up ultrasound.    RTC prn and with PCP as per routine.    Anaya Almanza MD  Department of Internal Medicine - [facility]

## 2024-05-07 ENCOUNTER — OFFICE VISIT (OUTPATIENT)
Dept: PSYCHIATRY | Facility: CLINIC | Age: 49
End: 2024-05-07
Payer: COMMERCIAL

## 2024-05-07 DIAGNOSIS — F33.2 SEVERE EPISODE OF RECURRENT MAJOR DEPRESSIVE DISORDER, WITHOUT PSYCHOTIC FEATURES: ICD-10-CM

## 2024-05-07 DIAGNOSIS — F40.10 SOCIAL ANXIETY DISORDER: Primary | ICD-10-CM

## 2024-05-07 DIAGNOSIS — F33.41 RECURRENT MAJOR DEPRESSION IN PARTIAL REMISSION: ICD-10-CM

## 2024-05-07 PROCEDURE — 99214 OFFICE O/P EST MOD 30 MIN: CPT | Mod: 95,,, | Performed by: STUDENT IN AN ORGANIZED HEALTH CARE EDUCATION/TRAINING PROGRAM

## 2024-05-07 RX ORDER — ALPRAZOLAM 0.25 MG/1
0.25 TABLET ORAL DAILY PRN
Qty: 30 TABLET | Refills: 0 | Status: SHIPPED | OUTPATIENT
Start: 2024-05-07 | End: 2024-06-06

## 2024-05-07 RX ORDER — BUSPIRONE HYDROCHLORIDE 10 MG/1
20 TABLET ORAL 2 TIMES DAILY
Qty: 120 TABLET | Refills: 11 | Status: SHIPPED | OUTPATIENT
Start: 2024-05-07 | End: 2025-05-07

## 2024-05-07 NOTE — PROGRESS NOTES
[facility]  DEPARTMENT OF PSYCHIATRY    ESTABLISHED OUTPATIENT VISIT     EXAMINING PRACTITIONER: Deepti Lopez MD      KEY:     I[]I Y = II[x][]II = Yes / Present / Present Though Denies / Endorses  I[]I N = II[][x]II = No / Absent / Absent Though Endorses / Denies  I[]I U = II[][]II = Unknown / Unable to Assess/Enact / Unwilling to Participate/Provide  I[]I A = II[x][x]II = Ambiguity / Uncertainty of Accuracy Exists  I[]I D = Denial or Minimization is Suspected/Evident  I[]I N/A = Non-Applicable    CHIEF COMPLAINT:     Patient Name: Mary Kate Lima  YOB: 1975  MRN: [id number]    Mary Kate Lima is a 48 y.o. female who is being seen by me for a follow up visit.  Mary Kate Lima presents with the chief complaint f/u for depression     CHART REVIEW:     Available documentation has been reviewed, and pertinent elements of the chart have been incorporated into this evaluation where appropriate.    The patient's last encounter with me was on: 11/2023  The patient's last encounter in the psychiatry department was on: 11/2023    PRESENTATION:     HPI, PSYCHIATRIC ROS & APPLICABLE MEDICAL ROS:     The patient location is: [place]  The chief complaint leading to consultation is: f/u    Visit type: audiovisual    Each patient to whom he or she provides medical services by telemedicine is:  (1) informed of the relationship between the physician and patient and the respective role of any other health care provider with respect to management of the patient; and (2) notified that he or she may decline to receive medical services by telemedicine and may withdraw from such care at any time.    Notes:      Pt presents for follow up- last seen 11/2023. Labs reviewed as well as recent encounters. She has been to the doctor 27 times since January! Has hysterectomy planned. Was struggling with CATALINA. She is anxious about the hysterectomy because she won't be able to work out for 6-8 weeks.She has  "been bleeding again since March 1. Her sleep is poor. She feels stressed. She has been having headaches. Mood and anxiety have been terrible. She feels like her heart is beating fast- but it is in fact not too fast. She has been forgetful, feels like she can't focus on anything. OB does not think she is perimenopausal. Took the atarax a handful of times- which helped. Work is "the same," remains very stressful.   She is still taking the wellbutrin 300 mg XL. Taking buspar 10 mg BID. Trazodone 50 mg qhs. She has maybe 3-4 left in the bottle of xanax.  Not out of school yet- getting out of school the 25.    NO new health problems or health diagnoses.   No thouhgts of suicide or self harm.                .  CURRENT PSYCHOTROPIC REGIMEN:     Wellbutrin 300 mg XL  Buspar 10 mg BID  Trazodone 50      HISTORY:       PAST PSYCHOTROPIC TRIALS:     cymbalta, trintellix, wellbutrin, buspar      ASSESSMENT:     III[x]III  DIAGNOSES AND PROBLEMS:     Major depressive disorder, moderate, recurrent  Social anxiety disorder  Insomnia                       .  PLAN:     IMPRESSION AND RECOMMENDATIONS:     MANAGEMENT PLAN, TREATMENT GOALS, THERAPEUTIC TECHNIQUES/APPROACHES & CLINICAL REASONING:  - continue wellbutrin 300 mg XL as  pt feels it has worsened her anxiety and possible caused sexual issues. Reviewed risks, including the risk for seizure, with the patient   - increase buspirone to 20 mg BID- discussed r/b/SE including but not limited to hypotension  - continue trazodone 50 qhs for sleep. Reviewed risks, benefits, SE including serotonin syndrome  -ok to take hydroxyzine PRN for anxiety  - wrote short term RX for alprazolam 0.25 mg for episodes of severe anxiety. Informed pt of the risks of continuous Benzodiazepine use including tolerance, dependence and withdrawals that may be life threatening upon abrupt cessation. Also advised not to take Benzodiazepines with Opiates, alcohol, or other sedatives and also not to drive or " operate heavy machinery while using Benzodiazepines.   - RTC 2 months or sooner PRN.  - f/u with therapy  - reviewed ER precautions and emergency numbers     No SI, HI, AVH. RTC 2 month. Reviewed ED precautions. Reviewed risks, benefits, SE of medication and all pt concerns and questions addressed.             .  III[x]III  PRESCRIPTION DRUG MANAGEMENT:     Prescription Drug Management entails the review, recommendation, or consideration without recommendation of medications, and as such was employed during the encounter.    Discussed, to the extent possible, diagnosis, risks and benefits of proposed treatment vs alternative treatments vs no treatment, potential side effects of these treatments and the inherent unpredictability of treatment. The patient expresses understanding of the above and displays the capacity to agree with this treatment given said understanding. Patient also agrees that, currently, the benefits outweigh the risks and would like to pursue treatment at this time.     Written material has additionally been provided, via the AVS or other pre-printed handouts.      EXAMINATION:     VITALS:     There were no vitals taken for this visit.    MENTAL STATUS EXAMINATION:     Mental Status Exam:  Appearance: unremarkable, age appropriate  Behavior/Cooperation: lppropriate normal, cooperative  Speech: appropriate rate, volume and tone normal tone, normal rate, normal pitch, normal volume  Language: uses words appropriately; NO aphasia or dysarthria  Mood: anxious and overwhelmed  Affect:  congruent with mood and appropriate to situation/content   Thought Process: normal and logical  Thought Content: normal, no suicidality, no homicidality, delusions, or paranoia  Level of Consciousness: Alert and Oriented x3  Memory:  Intact  Attention/concentration: appropriate for age/education.   Fund of Knowledge: appears adequate  Insight:  Intact  Judgment: Intact      RISK:     MITIGATION:     Risk Mitigation  Strategies, Harm Reduction Techniques, and Safety Netting are important interventions that can reduce acute and chronic risk.  Written material has been provided to supplement, augment, and reinforce any discussions and interventions, via the AVS or other pre-printed handouts.    PRESCRIPTION DRUG MONITORING:     LA/MS  AWARE  Site reviewed - No recent discrepancies or irregularities are noted.    MEDICAL DECISION MAKING:     Shared medical decision making and informed consent are the hallmark and bedrock of good clinical care, and as such have been employed and obtained, respectively, to the degree possible.  Written material has been provided to supplement, augment, and reinforce any discussions and interventions, via the AVS or other pre-printed handouts.    Additional psychoeducation has been provided, as warranted.      LEVEL OF MEDICAL DECISION MAKING AND TIME:     [facility] 4  Deepti Lopez MD  Department of Psychiatry  [facility]      DATA:     DIAGNOSTIC TESTING:     The chart was reviewed for recent diagnostic procedures and investigations, and pertinent results are noted below.    WEIGHTS & VITALS:     Wt Readings from Last 2 Encounters:   05/02/24 63 kg (138 lb 14.2 oz)   04/23/24 62.8 kg (138 lb 7.2 oz)     BP Readings from Last 1 Encounters:   05/02/24 102/68     Pulse Readings from Last 1 Encounters:   05/02/24 78        PERTINENT LABORATORY RESULTS:     Blood Counts, Electrolytes & Glucose: (i.e. WBC, ANC, Hemoglobin, Hematocrit, MCV, Platelets)  Lab Results   Component Value Date    WBC 6.85 04/02/2024    GRAN 4.2 04/02/2024    GRAN 60.8 04/02/2024    HGB 12.5 04/02/2024    HCT 37.2 04/02/2024    MCV 87 04/02/2024     04/02/2024     03/18/2024    K 5.1 03/18/2024    CALCIUM 9.9 03/18/2024    CO2 23 03/18/2024    ANIONGAP 8 03/18/2024    GLU 90 03/18/2024       Renal, Liver, Pancreas, Thyroid, Parathyroid, Prolactin, CPK, Lipids & Vitamin Levels: (i.e. Cr, BUN, Anion Gap, GFR, Urine  "Specific Gravity, Urine Protein, Microalburnin, AST, ALT, GGT, Alk Phos,Total Bili, Total Protein, Albumin, Ammonia, INR, Amylase, Lipase, TSH, Total T3, Total T4, Free T4 PTH, Prolactin, CPK, Cholesterol, Triglycerides, LDH, HDL, Vitamin B12, Folate, Vitamin D)  Lab Results   Component Value Date    CREATININE 0.9 03/18/2024    BUN 11 03/18/2024    EGFRNORACEVR >60.0 03/18/2024    AST 18 03/18/2024    ALT 16 03/18/2024    ALKPHOS 38 (L) 03/18/2024    BILITOT 0.4 03/18/2024    ALBUMIN 4.1 03/18/2024    TSH 1.189 11/20/2023    CHOL 190 12/01/2023    TRIG 68 12/01/2023    LDLCALC 98.4 12/01/2023    HDL 78 (H) 12/01/2023       Infection Diseases, Pregnancy Screenings & Drug Levels: (i.e. Hepatitis Panel, HIV, Syphilis, Urine & Blood Pregnancy Screens, beta hCG, Lithium, Valproic Acid, Carbamazepine, Lamotrigine, Phenytoin, Phenobarbital, Clozapine, Norclozapine, Clozapine + Norclozapine)   Lab Results   Component Value Date    HEPCAB Non-reactive 12/01/2023    [id number] Non-reactive 12/01/2023       Addiction: (i.e. Urine Toxicology, Blood Alcohol, PETH, EtG, EtS, CDT, Buprenorphine, Norbuprenorphine)  No results found for: "PCDSOALCOHOL", "PCDSOBENZOD", "BARBITURATES", "PCDSCOMETHA", "OPIATESCREEN", "COCAINEMETAB", "AMPHETAMINES", "MARIJUANATHC", "PCDSOPHENCYN", "PCDSUBUPRE", "PCDSUFENTANY", "PCDSUOXYCOD", "PCDSUTRAMA", "[id number]", "PETH", "ALCOHOLMEDIC", "THEYLGLUCU", "ETHYLSULF", "CDT", "BUPRENORPH", "NORBUPRENOR"    CARDIAC:     Results for orders placed or performed in visit on 11/30/23   IN OFFICE EKG 12-LEAD (to [place])    Collection Time: 11/30/23  2:11 PM    Narrative    Test Reason : R00.2,    Vent. Rate : 071 BPM     Atrial Rate : 071 BPM     P-R Int : 118 ms          QRS Dur : 076 ms      QT Int : 406 ms       P-R-T Axes : 054 055 023 degrees     QTc Int : 441 ms    Sinus rhythm with Premature supraventricular complexes  Otherwise normal ECG  No previous ECGs available  Confirmed by Selina Levine MD ([id number]) on " 12/5/2023 9:30:16 AM    Referred By:             Confirmed By:Selina Levine MD

## 2024-05-09 ENCOUNTER — PATIENT MESSAGE (OUTPATIENT)
Dept: OBSTETRICS AND GYNECOLOGY | Facility: CLINIC | Age: 49
End: 2024-05-09
Payer: COMMERCIAL

## 2024-05-10 ENCOUNTER — HOSPITAL ENCOUNTER (OUTPATIENT)
Dept: RADIOLOGY | Facility: HOSPITAL | Age: 49
Discharge: HOME | End: 2024-05-10
Attending: STUDENT IN AN ORGANIZED HEALTH CARE EDUCATION/TRAINING PROGRAM
Payer: COMMERCIAL

## 2024-05-10 DIAGNOSIS — R22.32 MASS OF ARM, LEFT: ICD-10-CM

## 2024-05-10 PROCEDURE — 76882 US LMTD JT/FCL EVL NVASC XTR: CPT | Mod: TC,LT

## 2024-05-10 PROCEDURE — 76882 US LMTD JT/FCL EVL NVASC XTR: CPT | Mod: 26,LT,, | Performed by: INTERNAL MEDICINE

## 2024-05-13 ENCOUNTER — OFFICE VISIT (OUTPATIENT)
Dept: OBSTETRICS AND GYNECOLOGY | Facility: CLINIC | Age: 49
End: 2024-05-13
Attending: STUDENT IN AN ORGANIZED HEALTH CARE EDUCATION/TRAINING PROGRAM
Payer: COMMERCIAL

## 2024-05-13 VITALS
HEIGHT: 66 IN | SYSTOLIC BLOOD PRESSURE: 118 MMHG | DIASTOLIC BLOOD PRESSURE: 76 MMHG | BODY MASS INDEX: 21.67 KG/M2 | WEIGHT: 134.81 LBS

## 2024-05-13 DIAGNOSIS — N93.9 ABNORMAL UTERINE BLEEDING (AUB): ICD-10-CM

## 2024-05-13 DIAGNOSIS — N93.9 ABNORMAL UTERINE BLEEDING: Primary | ICD-10-CM

## 2024-05-13 PROCEDURE — 3008F BODY MASS INDEX DOCD: CPT | Mod: CPTII,S$GLB,, | Performed by: STUDENT IN AN ORGANIZED HEALTH CARE EDUCATION/TRAINING PROGRAM

## 2024-05-13 PROCEDURE — 3078F DIAST BP <80 MM HG: CPT | Mod: CPTII,S$GLB,, | Performed by: STUDENT IN AN ORGANIZED HEALTH CARE EDUCATION/TRAINING PROGRAM

## 2024-05-13 PROCEDURE — 99213 OFFICE O/P EST LOW 20 MIN: CPT | Mod: S$GLB,,, | Performed by: STUDENT IN AN ORGANIZED HEALTH CARE EDUCATION/TRAINING PROGRAM

## 2024-05-13 PROCEDURE — 99999 PR PBB SHADOW E&M-EST. PATIENT-LVL III: CPT | Mod: PBBFAC,,, | Performed by: STUDENT IN AN ORGANIZED HEALTH CARE EDUCATION/TRAINING PROGRAM

## 2024-05-13 PROCEDURE — 3074F SYST BP LT 130 MM HG: CPT | Mod: CPTII,S$GLB,, | Performed by: STUDENT IN AN ORGANIZED HEALTH CARE EDUCATION/TRAINING PROGRAM

## 2024-05-14 ENCOUNTER — TELEPHONE (OUTPATIENT)
Dept: OBSTETRICS AND GYNECOLOGY | Facility: CLINIC | Age: 49
End: 2024-05-14
Payer: COMMERCIAL

## 2024-05-14 NOTE — PRE ADMISSION SCREENING
Pt unable to come in for pre op lab work.  Working 7a-6p and leaving town on Friday until Monday.  Will have to draw cbc and t/s am of sx.

## 2024-05-14 NOTE — TELEPHONE ENCOUNTER
Spoke to preadmit.  Pt had canceled her preadmit appt.  They did a phone interview with her but still needed labs.  They tried to reschedule her to come in to get labs drawn but pt is out of town and wasn't coming back until 5/20.  D/t pt's schedule, they will have to draw labs the morning of sx.  But phone interview is done!

## 2024-05-14 NOTE — TELEPHONE ENCOUNTER
----- Message from Tg Smith MD sent at 5/14/2024  9:30 AM CDT -----  Hi there,  Any chance we could set Mary Kate up with anesthesia?  Not sure if they were going to do a phone call for her.  Thank you again  -Tg

## 2024-05-15 ENCOUNTER — PATIENT MESSAGE (OUTPATIENT)
Dept: PREADMISSION TESTING | Facility: OTHER | Age: 49
End: 2024-05-15
Payer: COMMERCIAL

## 2024-05-15 NOTE — PRE-PROCEDURE INSTRUCTIONS
Information to Prepare you for your Surgery    PRE-ADMIT TESTING -  [phone]    [address]  [facility]          Your surgery has been scheduled at [facility]. We are pleased to have the opportunity to serve you. For Further Information please call [phone].    On the day of surgery please report to the Information Desk on the 1st floor.    CONTACT YOUR PHYSICIAN'S OFFICE THE DAY PRIOR TO YOUR SURGERY TO OBTAIN YOUR ARRIVAL TIME.     The evening before surgery do not eat anything after 9 p.m. ( this includes hard candy, chewing gum and mints).  You may only have GATORADE, POWERADE AND WATER  from 9 p.m. until you leave your home.   DO NOT DRINK ANY LIQUIDS ON THE WAY TO THE HOSPITAL.      Why does your anesthesiologist allow you to drink Gatorade/Powerade before surgery?  Gatorade/Powerade helps to increase your comfort before surgery and to decrease your nausea after surgery. The carbohydrates in Gatorade/Powerade help reduce your body's stress response to surgery.  If you are a diabetic-drink only water prior to surgery.    Outpatient Surgery- May allow 2 adult (18 and older) Support Persons (1 being the designated ) for all surgical/procedural patients. A breastfeeding mother will be allowed her infant and 2 adult Support Persons. No one under the age of 18 will be allowed in the building. No swapping out of visitors in the [facility].      SPECIAL MEDICATION INSTRUCTIONS: TAKE medications checked off by the Anesthesiologist on your Medication List.    Angiogram Patients: Take medications as instructed by your physician, including aspirin.     Surgery Patients:    If you take ASPIRIN - Your PHYSICIAN/SURGEON will need to inform you IF/OR when you need to stop taking aspirin prior to your surgery.     The week prior to surgery do not ot take any medications containing IBUPROFEN or NSAIDS ( Advil, Motrin, Goodys, BC, Aleve, Naproxen etc)  If you are not sure if you should take a medicine please call your surgeon's office.  Ok to take Tylenol    Do Not Wear any make-up (especially eye make-up) to surgery. Please remove any false eyelashes or eyelash extensions. If you arrive the day of surgery with makeup/eyelashes on you will be required to remove prior to surgery. (There is a risk of corneal abrasions if eye makeup/eyelash extensions are not removed)      Leave all valuables at home.   Do Not wear any jewelry or watches, including any metal in body piercings. Jewelry must be removed prior to coming to the hospital.  There is a possibility that rings that are unable to be removed may be cut off if they are on the surgical extremity.    Please remove all hair extensions, wigs, clips and any other metal accessories/ ornaments from your hair.  These items may pose a flammable/fire risk in Surgery and must be removed.    Do not shave your surgical area at least 5 days prior to your surgery. The surgical prep will be performed at the hospital according to Infection Control regulations.    Contact Lens must be removed before surgery. Either do not wear the contact lens or bring a case and solution for storage.  Please bring a container for eyeglasses or dentures as required.  Bring any paperwork your physician has provided, such as consent forms,  history and physicals, doctor's orders, etc.   Bring comfortable clothes that are loose fitting to wear upon discharge. Take into consideration the type of surgery being performed.  Maintain your diet as advised per your physician the day prior to surgery.      Adequate rest the night before surgery is advised.   Park in the Parking lot behind the hospital or in the [facility] across the street from the parking lot. Parking is complimentary.  If you will be discharged the same day as your procedure, please arrange for a responsible adult to drive you home or to accompany you if traveling by taxi.    YOU WILL NOT BE PERMITTED TO DRIVE OR TO LEAVE THE HOSPITAL ALONE AFTER SURGERY.   If you are being discharged the same day, it is strongly recommended that you arrange for someone to remain with you for the first 24 hrs following your surgery.    The Surgeon will speak to your family/visitor after your surgery regarding the outcome of your surgery and post op care.  The Surgeon may speak to you after your surgery, but there is a possibility you may not remember the details.  Please check with your family members regarding the conversation with the Surgeon.    We strongly recommend whoever is bringing you home be present for discharge instructions.  This will ensure a thorough understanding for your post op home care.          Thank you for your cooperation.  The Staff of [facility].            Bathing Instructions with Hibiclens    Shower the evening before and morning of your procedure with Chlorhexidine (Hibiclens)  do not use Chlorhexidine on your face or genitals. Do not get in your eyes.  Wash your face with water and your regular face wash/soap  Use your regular shampoo  Apply Chlorhexidine (Hibiclens) directly on your skin or on a wet washcloth and wash gently. When showering: Move away from the shower stream when applying Chlorhexidine (Hibiclens) to avoid rinsing off too soon.  Rinse thoroughly with warm water  Do not dilute Chlorhexidine (Hibiclens)   Dry off as usual, do not use any deodorant, powder, body lotions, perfume, after shave or cologne.

## 2024-05-16 RX ORDER — SODIUM CHLORIDE 0.9 % (FLUSH) 0.9 %
3 SYRINGE (ML) INJECTION
Status: CANCELLED | OUTPATIENT
Start: 2024-05-16

## 2024-05-16 RX ORDER — HYDROCODONE BITARTRATE AND ACETAMINOPHEN 5; 325 MG/1; MG/1
1 TABLET ORAL EVERY 4 HOURS PRN
Qty: 20 TABLET | Refills: 0 | Status: CANCELLED | OUTPATIENT
Start: 2024-05-16

## 2024-05-16 RX ORDER — SODIUM CHLORIDE, SODIUM LACTATE, POTASSIUM CHLORIDE, CALCIUM CHLORIDE 600; 310; 30; 20 MG/100ML; MG/100ML; MG/100ML; MG/100ML
INJECTION, SOLUTION INTRAVENOUS CONTINUOUS
Status: CANCELLED | OUTPATIENT
Start: 2024-05-16

## 2024-05-16 RX ORDER — HYDROCODONE BITARTRATE AND ACETAMINOPHEN 5; 325 MG/1; MG/1
1 TABLET ORAL EVERY 4 HOURS PRN
Status: CANCELLED | OUTPATIENT
Start: 2024-05-16

## 2024-05-16 RX ORDER — HYDROCODONE BITARTRATE AND ACETAMINOPHEN 10; 325 MG/1; MG/1
1 TABLET ORAL EVERY 4 HOURS PRN
Status: CANCELLED | OUTPATIENT
Start: 2024-05-16

## 2024-05-16 RX ORDER — MUPIROCIN 20 MG/G
OINTMENT TOPICAL
Status: CANCELLED | OUTPATIENT
Start: 2024-05-16

## 2024-05-16 RX ORDER — DIPHENHYDRAMINE HCL 25 MG
25 CAPSULE ORAL EVERY 4 HOURS PRN
Status: CANCELLED | OUTPATIENT
Start: 2024-05-16

## 2024-05-16 RX ORDER — HYDROMORPHONE HYDROCHLORIDE 1 MG/ML
1 INJECTION, SOLUTION INTRAMUSCULAR; INTRAVENOUS; SUBCUTANEOUS EVERY 4 HOURS PRN
Status: CANCELLED | OUTPATIENT
Start: 2024-05-16

## 2024-05-16 RX ORDER — ACETAMINOPHEN 325 MG/1
650 TABLET ORAL EVERY 4 HOURS PRN
Status: CANCELLED | OUTPATIENT
Start: 2024-05-16

## 2024-05-16 RX ORDER — KETOROLAC TROMETHAMINE 30 MG/ML
30 INJECTION, SOLUTION INTRAMUSCULAR; INTRAVENOUS
Status: CANCELLED | OUTPATIENT
Start: 2024-05-16 | End: 2024-05-17

## 2024-05-16 RX ORDER — PROCHLORPERAZINE EDISYLATE 5 MG/ML
5 INJECTION INTRAMUSCULAR; INTRAVENOUS EVERY 6 HOURS PRN
Status: CANCELLED | OUTPATIENT
Start: 2024-05-16

## 2024-05-16 RX ORDER — CEFAZOLIN SODIUM 2 G/50ML
2 SOLUTION INTRAVENOUS
Status: CANCELLED | OUTPATIENT
Start: 2024-05-16

## 2024-05-16 RX ORDER — PREGABALIN 50 MG/1
50 CAPSULE ORAL
Status: CANCELLED | OUTPATIENT
Start: 2024-05-16

## 2024-05-16 RX ORDER — PROCHLORPERAZINE EDISYLATE 5 MG/ML
5 INJECTION INTRAMUSCULAR; INTRAVENOUS EVERY 10 MIN PRN
Status: CANCELLED | OUTPATIENT
Start: 2024-05-16

## 2024-05-16 RX ORDER — IBUPROFEN 800 MG/1
800 TABLET ORAL EVERY 8 HOURS PRN
Qty: 30 TABLET | Refills: 0 | Status: CANCELLED | OUTPATIENT
Start: 2024-05-16

## 2024-05-16 RX ORDER — LIDOCAINE HYDROCHLORIDE 10 MG/ML
0.5 INJECTION, SOLUTION EPIDURAL; INFILTRATION; INTRACAUDAL; PERINEURAL
Status: CANCELLED | OUTPATIENT
Start: 2024-05-16

## 2024-05-16 RX ORDER — ACETAMINOPHEN 500 MG
1000 TABLET ORAL
Status: CANCELLED | OUTPATIENT
Start: 2024-05-16

## 2024-05-16 RX ORDER — ONDANSETRON HYDROCHLORIDE 2 MG/ML
4 INJECTION, SOLUTION INTRAVENOUS DAILY PRN
Status: CANCELLED | OUTPATIENT
Start: 2024-05-16

## 2024-05-16 RX ORDER — DIPHENHYDRAMINE HYDROCHLORIDE 50 MG/ML
25 INJECTION INTRAMUSCULAR; INTRAVENOUS EVERY 4 HOURS PRN
Status: CANCELLED | OUTPATIENT
Start: 2024-05-16

## 2024-05-16 RX ORDER — CELECOXIB 200 MG/1
400 CAPSULE ORAL
Status: CANCELLED | OUTPATIENT
Start: 2024-05-16

## 2024-05-16 RX ORDER — HYDROMORPHONE HYDROCHLORIDE 1 MG/ML
0.2 INJECTION, SOLUTION INTRAMUSCULAR; INTRAVENOUS; SUBCUTANEOUS EVERY 5 MIN PRN
Status: CANCELLED | OUTPATIENT
Start: 2024-05-16

## 2024-05-16 RX ORDER — ONDANSETRON 8 MG/1
8 TABLET, ORALLY DISINTEGRATING ORAL EVERY 8 HOURS PRN
Status: CANCELLED | OUTPATIENT
Start: 2024-05-16

## 2024-05-16 RX ORDER — FAMOTIDINE 20 MG/1
20 TABLET, FILM COATED ORAL
Status: CANCELLED | OUTPATIENT
Start: 2024-05-16

## 2024-05-16 RX ORDER — AMOXICILLIN 250 MG
1 CAPSULE ORAL 2 TIMES DAILY
Status: CANCELLED | OUTPATIENT
Start: 2024-05-16

## 2024-05-16 RX ORDER — ONDANSETRON 4 MG/1
4 TABLET, FILM COATED ORAL EVERY 6 HOURS PRN
Qty: 20 TABLET | Refills: 0 | Status: SHIPPED | OUTPATIENT
Start: 2024-05-16

## 2024-05-16 RX ORDER — MEPERIDINE HYDROCHLORIDE 25 MG/ML
12.5 INJECTION INTRAMUSCULAR; INTRAVENOUS; SUBCUTANEOUS ONCE AS NEEDED
Status: CANCELLED | OUTPATIENT
Start: 2024-05-16 | End: 2024-05-17

## 2024-05-16 RX ORDER — POLYETHYLENE GLYCOL 3350 17 G/17G
17 POWDER, FOR SOLUTION ORAL DAILY
Status: CANCELLED | OUTPATIENT
Start: 2024-05-16

## 2024-05-16 RX ORDER — IBUPROFEN 400 MG/1
800 TABLET ORAL
Status: CANCELLED | OUTPATIENT
Start: 2024-05-17

## 2024-05-16 NOTE — H&P (VIEW-ONLY)
[facility]  History & Physical  Gynecology     SUBJECTIVE:      Chief Complaint/Reason for Admission: Abnormal uterine bleeding, uterine fibroids     History of Present Illness:  Patient is a 48 y.o.  who presents with abnormal uterine bleeding.  Has a history of heavy cycles over the past few years that have worsened.  Initially did well on OCPs but presented this year with continued bleeding.  Underwent hysteroscopic myomectomy and IUD insertion.  IUD expelled postoperatively.  Has continued to have bleeding despite medication.  Desires treatment with hysterectomy.         (Not in a hospital admission)        Review of patient's allergies indicates:  No Known Allergies     Gynecology:  Menarche 14.  Cycles occur every 21 days with menses lasting 4 days.  Denies any history of abnormal pap smears or STIs.  OB History            3    Para   3    Term   3            AB        Living   3           SAB        IAB        Ectopic        Multiple        Live Births   3             Obstetric Comments   Menarche age 14                     Past Medical History:   Diagnosis Date    Abnormal Pap smear of cervix              Past Surgical History:   Procedure Laterality Date     SECTION         x3    ELBOW SURGERY Right      HEMORRHOID SURGERY        HYSTEROSCOPIC RESECTION OF MYOMA N/A 2024     Procedure: MYOMECTOMY, HYSTEROSCOPIC;  Surgeon: Tg Smith MD;  Location: [facility];  Service: OB/GYN;  Laterality: N/A;    INTRAUTERINE DEVICE INSERTION N/A 2024     Procedure: INSERTION, INTRAUTERINE DEVICE;  Surgeon: Tg Smith MD;  Location: [facility];  Service: OB/GYN;  Laterality: N/A;    TONSILLECTOMY   76    TUBAL LIGATION                 Family History   Problem Relation Name Age of Onset    Breast cancer Paternal Grandmother        Breast cancer Paternal Aunt        Diabetes Maternal Grandmother Yanira      Colon cancer Neg Hx        Ovarian cancer Neg Hx         "  Social History   Social History            Tobacco Use    Smoking status: Never    Smokeless tobacco: Never   Substance Use Topics    Alcohol use: Yes       Alcohol/week: 4.0 standard drinks of alcohol       Types: 1 Glasses of wine, 3 Shots of liquor per week       Comment: socially    Drug use: Never              Current Outpatient Medications   Medication Instructions    ALPRAZolam (XANAX) 0.25 mg, Oral, Daily PRN    amoxicillin-clavulanate 875-125mg (AUGMENTIN) 875-125 mg per tablet 1 tablet, Every 12 hours    buPROPion (WELLBUTRIN XL) 300 mg, Oral, Daily    busPIRone (BUSPAR) 20 mg, Oral, 2 times daily    diazePAM (VALIUM) 10 MG Tab Take by mouth.    ibuprofen (ADVIL,MOTRIN) 800 mg, Oral, Every 8 hours PRN    oxyCODONE-acetaminophen (PERCOCET) 5-325 mg per tablet 1 tablet, Oral, Every 4 hours PRN    traZODone (DESYREL) 50 mg, Oral, Nightly            ROS:      GENERAL: Denies unintentional weight gain or weight loss. Feeling well overall.   SKIN: Denies rash or lesions.   HEENT: Denies headaches, or vision changes.   CARDIOVASCULAR: Denies palpitations or chest pain.   RESPIRATORY: Denies shortness of breath or dyspnea on exertion.  BREASTS: Denies pain, lumps, or nipple discharge.   ABDOMEN: Denies abdominal pain, constipation, diarrhea, nausea, vomiting, change in appetite.  URINARY: Denies frequency, dysuria, hematuria.  NEUROLOGIC: Denies syncope or weakness.   PSYCHIATRIC: Denies depression, anxiety or mood swings.     Physical Exam:      PHYSICAL EXAM:  /76 (BP Location: Left arm, Patient Position: Sitting, BP Method: Medium (Manual))   Ht 5' 6" (1.676 m)   Wt 61.1 kg (134 lb 13 oz)   LMP 03/01/2024 (Exact Date)   BMI 21.76 kg/m²   Body mass index is 21.76 kg/m².      APPEARANCE: Well nourished, well developed, in no acute distress.  PSYCH: Appropriate mood and affect.  SKIN: No acne or hirsutism.  NECK: Neck symmetric without masses or thyromegaly.  NODES: No inguinal, axillary, or " supraclavicular lymph node enlargement.  CHEST: Normal respiratory effort.    CARDIOVASCULAR:  Regular rate and rhythm.  LUNGS:  Clear to auscultation bilaterally.  BREASTS: Symmetrical, no skin changes or visible lesions.  No palpable masses or nipple discharge bilaterally.  ABDOMEN: Soft.  No tenderness or masses.    PELVIC: Normal external genitalia without lesions.  Normal hair distribution.  Adequate perineal body, normal urethral meatus.  Vagina moist and well rugated without lesions or discharge.  Cervix pink, without lesions, discharge or tenderness.  No significant cystocele or rectocele.  Bimanual exam shows uterus to be normal size, regular, mobile and nontender.  Adnexa without masses or tenderness.    EXTREMITIES: No edema.  No tenderness to palpation.     2023 NEM, HPV neg  2023 U/S Uterus 14 x 7 x 9 cm with multiple fibroids, largest measuring 3.5 cm.  R and L ovaries not well visualized  2024 CBC 7>13/37<244  2023 TSH 1     ASSESSMENT/PLAN:      Abnormal uterine bleeding  Uterine fibroids     Discussed further management of symptoms with patient including expectant, medical, surgical options.  At this time patient has failed medical management and desires to proceed with surgical management.  Discussed surgical options with patient including but not limited to hysteroscopy, ablation, myomectomy, and hysterectomy.  Reviewed alteratives.  Patient desires hysterectomy.  Also reviewed fertility sparing options with patient and partner but patient expresses on numerous occasions that she no longer desires fertility.  R/B/A of hysterectomy reviewed with patient and  including but not limited to risk of postoperative pain and fever, bleeding requiring possible blood transfusion, infection, pelvic organ prolapse, leakage of urine, continued pain or bleeding, possible supracervical hysterectomy and need for continued pap smear, cuff dehiscence, painful intercourse, damage to any surrounding tissue  areas including but not limited to bladder, ureters, bowel, uterus, fallopian tubes, ovaries.  Discussed that plan will be to attempt procedure laparoscopically but also reviewed risk of larger abdominal incision or enlargement of incisions. Reviewed increased risks of scarring and injury due to surgical history. Also discussed morcellation if needed and risk of possible underlying or unidentified sarcoma.  Also discussed outcomes if cervix remains in place.  Discussed bilateral salpingectomy if possible.  Also discussed ovarian conservation vs oophorectomy.  Reviewed risk of reoperation or ovarian cancer.  Also discussed importance of ovarian hormones.  Patient desires ovarian conservation.  Review possible oophorectomy if ovaries damaged or appear abnormal during procedure.  Patient voiced understanding and desires to proceed.  Discussed possible blood transfusion if needed and reviewed risk of blood transfusion including but not limited to transfusion reaction and infection.  Patient amenable to blood if needed.  Also discussed cystoscopy with patient and reviewed risks and benefits.  All questions answered.  Consents signed.  Discussed routine postoperative care and recovery time with patient.  All questions answered.  To OR for laparoscopic hysterectomy, bilateral salpingectomy, cystoscopy.

## 2024-05-16 NOTE — PROGRESS NOTES
[facility]  History & Physical  Gynecology    SUBJECTIVE:     Chief Complaint/Reason for Admission: Abnormal uterine bleeding, uterine fibroids    History of Present Illness:  Patient is a 48 y.o.  who presents with abnormal uterine bleeding.  Has a history of heavy cycles over the past few years that have worsened.  Initially did well on OCPs but presented this year with continued bleeding.  Underwent hysteroscopic myomectomy and IUD insertion.  IUD expelled postoperatively.  Has continued to have bleeding despite medication.  Desires treatment with hysterectomy.        (Not in a hospital admission)      Review of patient's allergies indicates:  No Known Allergies    Gynecology:  Menarche 14.  Cycles occur every 21 days with menses lasting 4 days.  Denies any history of abnormal pap smears or STIs.  OB History          3    Para   3    Term   3            AB        Living   3         SAB        IAB        Ectopic        Multiple        Live Births   3           Obstetric Comments   Menarche age 14             Past Medical History:   Diagnosis Date    Abnormal Pap smear of cervix      Past Surgical History:   Procedure Laterality Date     SECTION      x3    ELBOW SURGERY Right     HEMORRHOID SURGERY      HYSTEROSCOPIC RESECTION OF MYOMA N/A 2024    Procedure: MYOMECTOMY, HYSTEROSCOPIC;  Surgeon: Tg Smith MD;  Location: [facility];  Service: OB/GYN;  Laterality: N/A;    INTRAUTERINE DEVICE INSERTION N/A 2024    Procedure: INSERTION, INTRAUTERINE DEVICE;  Surgeon: Tg Smith MD;  Location: [facility];  Service: OB/GYN;  Laterality: N/A;    TONSILLECTOMY  76    TUBAL LIGATION       Family History   Problem Relation Name Age of Onset    Breast cancer Paternal Grandmother      Breast cancer Paternal Aunt      Diabetes Maternal Grandmother Yanira     Colon cancer Neg Hx      Ovarian cancer Neg Hx       Social History     Tobacco Use    Smoking status: Never     "Smokeless tobacco: Never   Substance Use Topics    Alcohol use: Yes     Alcohol/week: 4.0 standard drinks of alcohol     Types: 1 Glasses of wine, 3 Shots of liquor per week     Comment: socially    Drug use: Never     Current Outpatient Medications   Medication Instructions    ALPRAZolam (XANAX) 0.25 mg, Oral, Daily PRN    amoxicillin-clavulanate 875-125mg (AUGMENTIN) 875-125 mg per tablet 1 tablet, Every 12 hours    buPROPion (WELLBUTRIN XL) 300 mg, Oral, Daily    busPIRone (BUSPAR) 20 mg, Oral, 2 times daily    diazePAM (VALIUM) 10 MG Tab Take by mouth.    ibuprofen (ADVIL,MOTRIN) 800 mg, Oral, Every 8 hours PRN    oxyCODONE-acetaminophen (PERCOCET) 5-325 mg per tablet 1 tablet, Oral, Every 4 hours PRN    traZODone (DESYREL) 50 mg, Oral, Nightly         ROS:     GENERAL: Denies unintentional weight gain or weight loss. Feeling well overall.   SKIN: Denies rash or lesions.   HEENT: Denies headaches, or vision changes.   CARDIOVASCULAR: Denies palpitations or chest pain.   RESPIRATORY: Denies shortness of breath or dyspnea on exertion.  BREASTS: Denies pain, lumps, or nipple discharge.   ABDOMEN: Denies abdominal pain, constipation, diarrhea, nausea, vomiting, change in appetite.  URINARY: Denies frequency, dysuria, hematuria.  NEUROLOGIC: Denies syncope or weakness.   PSYCHIATRIC: Denies depression, anxiety or mood swings.    Physical Exam:      PHYSICAL EXAM:  /76 (BP Location: Left arm, Patient Position: Sitting, BP Method: Medium (Manual))   Ht 5' 6" (1.676 m)   Wt 61.1 kg (134 lb 13 oz)   LMP 03/01/2024 (Exact Date)   BMI 21.76 kg/m²   Body mass index is 21.76 kg/m².     APPEARANCE: Well nourished, well developed, in no acute distress.  PSYCH: Appropriate mood and affect.  SKIN: No acne or hirsutism.  NECK: Neck symmetric without masses or thyromegaly.  NODES: No inguinal, axillary, or supraclavicular lymph node enlargement.  CHEST: Normal respiratory effort.    CARDIOVASCULAR:  Regular rate and " rhythm.  LUNGS:  Clear to auscultation bilaterally.  BREASTS: Symmetrical, no skin changes or visible lesions.  No palpable masses or nipple discharge bilaterally.  ABDOMEN: Soft.  No tenderness or masses.    PELVIC: Normal external genitalia without lesions.  Normal hair distribution.  Adequate perineal body, normal urethral meatus.  Vagina moist and well rugated without lesions or discharge.  Cervix pink, without lesions, discharge or tenderness.  No significant cystocele or rectocele.  Bimanual exam shows uterus to be normal size, regular, mobile and nontender.  Adnexa without masses or tenderness.    EXTREMITIES: No edema.  No tenderness to palpation.    2023 NEM, HPV neg  2023 U/S Uterus 14 x 7 x 9 cm with multiple fibroids, largest measuring 3.5 cm.  R and L ovaries not well visualized  2024 CBC 7>13/37<244  2023 TSH 1    ASSESSMENT/PLAN:     Abnormal uterine bleeding  Uterine fibroids    Discussed further management of symptoms with patient including expectant, medical, surgical options.  At this time patient has failed medical management and desires to proceed with surgical management.  Discussed surgical options with patient including but not limited to hysteroscopy, ablation, myomectomy, and hysterectomy.  Reviewed alteratives.  Patient desires hysterectomy.  Also reviewed fertility sparing options with patient and partner but patient expresses on numerous occasions that she no longer desires fertility.  R/B/A of hysterectomy reviewed with patient and  including but not limited to risk of postoperative pain and fever, bleeding requiring possible blood transfusion, infection, pelvic organ prolapse, leakage of urine, continued pain or bleeding, possible supracervical hysterectomy and need for continued pap smear, cuff dehiscence, painful intercourse, damage to any surrounding tissue areas including but not limited to bladder, ureters, bowel, uterus, fallopian tubes, ovaries.  Discussed that plan will  be to attempt procedure laparoscopically but also reviewed risk of larger abdominal incision or enlargement of incisions. Reviewed increased risks of scarring and injury due to surgical history. Also discussed morcellation if needed and risk of possible underlying or unidentified sarcoma.  Also discussed outcomes if cervix remains in place.  Discussed bilateral salpingectomy if possible.  Also discussed ovarian conservation vs oophorectomy.  Reviewed risk of reoperation or ovarian cancer.  Also discussed importance of ovarian hormones.  Patient desires ovarian conservation.  Review possible oophorectomy if ovaries damaged or appear abnormal during procedure.  Patient voiced understanding and desires to proceed.  Discussed possible blood transfusion if needed and reviewed risk of blood transfusion including but not limited to transfusion reaction and infection.  Patient amenable to blood if needed.  Also discussed cystoscopy with patient and reviewed risks and benefits.  All questions answered.  Consents signed.  Discussed routine postoperative care and recovery time with patient.  All questions answered.  To OR for laparoscopic hysterectomy, bilateral salpingectomy, cystoscopy.

## 2024-05-16 NOTE — H&P
[facility]  History & Physical  Gynecology     SUBJECTIVE:      Chief Complaint/Reason for Admission: Abnormal uterine bleeding, uterine fibroids     History of Present Illness:  Patient is a 48 y.o.  who presents with abnormal uterine bleeding.  Has a history of heavy cycles over the past few years that have worsened.  Initially did well on OCPs but presented this year with continued bleeding.  Underwent hysteroscopic myomectomy and IUD insertion.  IUD expelled postoperatively.  Has continued to have bleeding despite medication.  Desires treatment with hysterectomy.         (Not in a hospital admission)        Review of patient's allergies indicates:  No Known Allergies     Gynecology:  Menarche 14.  Cycles occur every 21 days with menses lasting 4 days.  Denies any history of abnormal pap smears or STIs.  OB History            3    Para   3    Term   3            AB        Living   3           SAB        IAB        Ectopic        Multiple        Live Births   3             Obstetric Comments   Menarche age 14                     Past Medical History:   Diagnosis Date    Abnormal Pap smear of cervix              Past Surgical History:   Procedure Laterality Date     SECTION         x3    ELBOW SURGERY Right      HEMORRHOID SURGERY        HYSTEROSCOPIC RESECTION OF MYOMA N/A 2024     Procedure: MYOMECTOMY, HYSTEROSCOPIC;  Surgeon: Tg Smith MD;  Location: [facility];  Service: OB/GYN;  Laterality: N/A;    INTRAUTERINE DEVICE INSERTION N/A 2024     Procedure: INSERTION, INTRAUTERINE DEVICE;  Surgeon: Tg Smith MD;  Location: [facility];  Service: OB/GYN;  Laterality: N/A;    TONSILLECTOMY   76    TUBAL LIGATION                 Family History   Problem Relation Name Age of Onset    Breast cancer Paternal Grandmother        Breast cancer Paternal Aunt        Diabetes Maternal Grandmother Yanira      Colon cancer Neg Hx        Ovarian cancer Neg Hx         "  Social History   Social History            Tobacco Use    Smoking status: Never    Smokeless tobacco: Never   Substance Use Topics    Alcohol use: Yes       Alcohol/week: 4.0 standard drinks of alcohol       Types: 1 Glasses of wine, 3 Shots of liquor per week       Comment: socially    Drug use: Never              Current Outpatient Medications   Medication Instructions    ALPRAZolam (XANAX) 0.25 mg, Oral, Daily PRN    amoxicillin-clavulanate 875-125mg (AUGMENTIN) 875-125 mg per tablet 1 tablet, Every 12 hours    buPROPion (WELLBUTRIN XL) 300 mg, Oral, Daily    busPIRone (BUSPAR) 20 mg, Oral, 2 times daily    diazePAM (VALIUM) 10 MG Tab Take by mouth.    ibuprofen (ADVIL,MOTRIN) 800 mg, Oral, Every 8 hours PRN    oxyCODONE-acetaminophen (PERCOCET) 5-325 mg per tablet 1 tablet, Oral, Every 4 hours PRN    traZODone (DESYREL) 50 mg, Oral, Nightly            ROS:      GENERAL: Denies unintentional weight gain or weight loss. Feeling well overall.   SKIN: Denies rash or lesions.   HEENT: Denies headaches, or vision changes.   CARDIOVASCULAR: Denies palpitations or chest pain.   RESPIRATORY: Denies shortness of breath or dyspnea on exertion.  BREASTS: Denies pain, lumps, or nipple discharge.   ABDOMEN: Denies abdominal pain, constipation, diarrhea, nausea, vomiting, change in appetite.  URINARY: Denies frequency, dysuria, hematuria.  NEUROLOGIC: Denies syncope or weakness.   PSYCHIATRIC: Denies depression, anxiety or mood swings.     Physical Exam:      PHYSICAL EXAM:  /76 (BP Location: Left arm, Patient Position: Sitting, BP Method: Medium (Manual))   Ht 5' 6" (1.676 m)   Wt 61.1 kg (134 lb 13 oz)   LMP 03/01/2024 (Exact Date)   BMI 21.76 kg/m²   Body mass index is 21.76 kg/m².      APPEARANCE: Well nourished, well developed, in no acute distress.  PSYCH: Appropriate mood and affect.  SKIN: No acne or hirsutism.  NECK: Neck symmetric without masses or thyromegaly.  NODES: No inguinal, axillary, or " supraclavicular lymph node enlargement.  CHEST: Normal respiratory effort.    CARDIOVASCULAR:  Regular rate and rhythm.  LUNGS:  Clear to auscultation bilaterally.  BREASTS: Symmetrical, no skin changes or visible lesions.  No palpable masses or nipple discharge bilaterally.  ABDOMEN: Soft.  No tenderness or masses.    PELVIC: Normal external genitalia without lesions.  Normal hair distribution.  Adequate perineal body, normal urethral meatus.  Vagina moist and well rugated without lesions or discharge.  Cervix pink, without lesions, discharge or tenderness.  No significant cystocele or rectocele.  Bimanual exam shows uterus to be normal size, regular, mobile and nontender.  Adnexa without masses or tenderness.    EXTREMITIES: No edema.  No tenderness to palpation.     2023 NEM, HPV neg  2023 U/S Uterus 14 x 7 x 9 cm with multiple fibroids, largest measuring 3.5 cm.  R and L ovaries not well visualized  2024 CBC 7>13/37<244  2023 TSH 1     ASSESSMENT/PLAN:      Abnormal uterine bleeding  Uterine fibroids     Discussed further management of symptoms with patient including expectant, medical, surgical options.  At this time patient has failed medical management and desires to proceed with surgical management.  Discussed surgical options with patient including but not limited to hysteroscopy, ablation, myomectomy, and hysterectomy.  Reviewed alteratives.  Patient desires hysterectomy.  Also reviewed fertility sparing options with patient and partner but patient expresses on numerous occasions that she no longer desires fertility.  R/B/A of hysterectomy reviewed with patient and  including but not limited to risk of postoperative pain and fever, bleeding requiring possible blood transfusion, infection, pelvic organ prolapse, leakage of urine, continued pain or bleeding, possible supracervical hysterectomy and need for continued pap smear, cuff dehiscence, painful intercourse, damage to any surrounding tissue  areas including but not limited to bladder, ureters, bowel, uterus, fallopian tubes, ovaries.  Discussed that plan will be to attempt procedure laparoscopically but also reviewed risk of larger abdominal incision or enlargement of incisions. Reviewed increased risks of scarring and injury due to surgical history. Also discussed morcellation if needed and risk of possible underlying or unidentified sarcoma.  Also discussed outcomes if cervix remains in place.  Discussed bilateral salpingectomy if possible.  Also discussed ovarian conservation vs oophorectomy.  Reviewed risk of reoperation or ovarian cancer.  Also discussed importance of ovarian hormones.  Patient desires ovarian conservation.  Review possible oophorectomy if ovaries damaged or appear abnormal during procedure.  Patient voiced understanding and desires to proceed.  Discussed possible blood transfusion if needed and reviewed risk of blood transfusion including but not limited to transfusion reaction and infection.  Patient amenable to blood if needed.  Also discussed cystoscopy with patient and reviewed risks and benefits.  All questions answered.  Consents signed.  Discussed routine postoperative care and recovery time with patient.  All questions answered.  To OR for laparoscopic hysterectomy, bilateral salpingectomy, cystoscopy.

## 2024-05-21 ENCOUNTER — HOSPITAL ENCOUNTER (OUTPATIENT)
Facility: OTHER | Age: 49
Discharge: HOME | End: 2024-05-21
Attending: STUDENT IN AN ORGANIZED HEALTH CARE EDUCATION/TRAINING PROGRAM | Admitting: STUDENT IN AN ORGANIZED HEALTH CARE EDUCATION/TRAINING PROGRAM
Payer: COMMERCIAL

## 2024-05-21 ENCOUNTER — ANESTHESIA (OUTPATIENT)
Dept: SURGERY | Facility: OTHER | Age: 49
End: 2024-05-21
Payer: COMMERCIAL

## 2024-05-21 DIAGNOSIS — Z01.818 PRE-OP TESTING: Primary | ICD-10-CM

## 2024-05-21 DIAGNOSIS — N93.9 ABNORMAL UTERINE BLEEDING (AUB): ICD-10-CM

## 2024-05-21 DIAGNOSIS — N93.9 ABNORMAL UTERINE BLEEDING: ICD-10-CM

## 2024-05-21 LAB
ABO + RH BLD: NORMAL
B-HCG UR QL: NEGATIVE
BASOPHILS # BLD AUTO: 0.04 K/UL (ref 0–0.2)
BASOPHILS NFR BLD: 0.9 % (ref 0–1.9)
BLD GP AB SCN CELLS X3 SERPL QL: NORMAL
CTP QC/QA: YES
DIFFERENTIAL METHOD BLD: ABNORMAL
EOSINOPHIL # BLD AUTO: 0.3 K/UL (ref 0–0.5)
EOSINOPHIL NFR BLD: 5.9 % (ref 0–8)
ERYTHROCYTE [DISTWIDTH] IN BLOOD BY AUTOMATED COUNT: 11.8 % (ref 11.5–14.5)
HCT VFR BLD AUTO: 36.5 % (ref 37–48.5)
HGB BLD-MCNC: 12 G/DL (ref 12–16)
IMM GRANULOCYTES # BLD AUTO: 0.01 K/UL (ref 0–0.04)
IMM GRANULOCYTES NFR BLD AUTO: 0.2 % (ref 0–0.5)
LYMPHOCYTES # BLD AUTO: 1.7 K/UL (ref 1–4.8)
LYMPHOCYTES NFR BLD: 37.1 % (ref 18–48)
MCH RBC QN AUTO: 29.3 PG (ref 27–31)
MCHC RBC AUTO-ENTMCNC: 32.9 G/DL (ref 32–36)
MCV RBC AUTO: 89 FL (ref 82–98)
MONOCYTES # BLD AUTO: 0.5 K/UL (ref 0.3–1)
MONOCYTES NFR BLD: 10.6 % (ref 4–15)
NEUTROPHILS # BLD AUTO: 2.1 K/UL (ref 1.8–7.7)
NEUTROPHILS NFR BLD: 45.3 % (ref 38–73)
NRBC BLD-RTO: 0 /100 WBC
PLATELET # BLD AUTO: 176 K/UL (ref 150–450)
PMV BLD AUTO: 9.8 FL (ref 9.2–12.9)
POCT GLUCOSE: 79 MG/DL (ref 70–110)
RBC # BLD AUTO: 4.09 M/UL (ref 4–5.4)
SPECIMEN OUTDATE: NORMAL
WBC # BLD AUTO: 4.61 K/UL (ref 3.9–12.7)

## 2024-05-21 PROCEDURE — 64488 TAP BLOCK BI INJECTION: CPT | Performed by: ANESTHESIOLOGY

## 2024-05-21 PROCEDURE — D9220A PRA ANESTHESIA: Mod: CRNA,,, | Performed by: NURSE ANESTHETIST, CERTIFIED REGISTERED

## 2024-05-21 PROCEDURE — 88307 TISSUE EXAM BY PATHOLOGIST: CPT | Performed by: STUDENT IN AN ORGANIZED HEALTH CARE EDUCATION/TRAINING PROGRAM

## 2024-05-21 PROCEDURE — 36000710: Performed by: STUDENT IN AN ORGANIZED HEALTH CARE EDUCATION/TRAINING PROGRAM

## 2024-05-21 PROCEDURE — C9290 INJ, BUPIVACAINE LIPOSOME: HCPCS | Performed by: ANESTHESIOLOGY

## 2024-05-21 PROCEDURE — 88307 TISSUE EXAM BY PATHOLOGIST: CPT | Mod: 26,,, | Performed by: STUDENT IN AN ORGANIZED HEALTH CARE EDUCATION/TRAINING PROGRAM

## 2024-05-21 PROCEDURE — 81025 URINE PREGNANCY TEST: CPT | Performed by: ANESTHESIOLOGY

## 2024-05-21 PROCEDURE — 71000015 HC POSTOP RECOV 1ST HR: Performed by: STUDENT IN AN ORGANIZED HEALTH CARE EDUCATION/TRAINING PROGRAM

## 2024-05-21 PROCEDURE — 25000003 PHARM REV CODE 250: Performed by: ANESTHESIOLOGY

## 2024-05-21 PROCEDURE — 37000009 HC ANESTHESIA EA ADD 15 MINS: Performed by: STUDENT IN AN ORGANIZED HEALTH CARE EDUCATION/TRAINING PROGRAM

## 2024-05-21 PROCEDURE — 85025 COMPLETE CBC W/AUTO DIFF WBC: CPT | Performed by: STUDENT IN AN ORGANIZED HEALTH CARE EDUCATION/TRAINING PROGRAM

## 2024-05-21 PROCEDURE — 64488 TAP BLOCK BI INJECTION: CPT | Mod: 59,,, | Performed by: ANESTHESIOLOGY

## 2024-05-21 PROCEDURE — P9045 ALBUMIN (HUMAN), 5%, 250 ML: HCPCS | Mod: JZ,JG | Performed by: NURSE ANESTHETIST, CERTIFIED REGISTERED

## 2024-05-21 PROCEDURE — 58573 TLH W/T/O UTERUS OVER 250 G: CPT | Mod: ,,, | Performed by: STUDENT IN AN ORGANIZED HEALTH CARE EDUCATION/TRAINING PROGRAM

## 2024-05-21 PROCEDURE — 63600175 PHARM REV CODE 636 W HCPCS: Performed by: NURSE ANESTHETIST, CERTIFIED REGISTERED

## 2024-05-21 PROCEDURE — 71000033 HC RECOVERY, INTIAL HOUR: Performed by: STUDENT IN AN ORGANIZED HEALTH CARE EDUCATION/TRAINING PROGRAM

## 2024-05-21 PROCEDURE — 25000003 PHARM REV CODE 250: Performed by: NURSE ANESTHETIST, CERTIFIED REGISTERED

## 2024-05-21 PROCEDURE — 71000039 HC RECOVERY, EACH ADD'L HOUR: Performed by: STUDENT IN AN ORGANIZED HEALTH CARE EDUCATION/TRAINING PROGRAM

## 2024-05-21 PROCEDURE — 82962 GLUCOSE BLOOD TEST: CPT | Performed by: STUDENT IN AN ORGANIZED HEALTH CARE EDUCATION/TRAINING PROGRAM

## 2024-05-21 PROCEDURE — 25000003 PHARM REV CODE 250: Performed by: STUDENT IN AN ORGANIZED HEALTH CARE EDUCATION/TRAINING PROGRAM

## 2024-05-21 PROCEDURE — 27201423 OPTIME MED/SURG SUP & DEVICES STERILE SUPPLY: Performed by: STUDENT IN AN ORGANIZED HEALTH CARE EDUCATION/TRAINING PROGRAM

## 2024-05-21 PROCEDURE — 58573 TLH W/T/O UTERUS OVER 250 G: CPT | Mod: 82,,, | Performed by: OBSTETRICS & GYNECOLOGY

## 2024-05-21 PROCEDURE — 63600175 PHARM REV CODE 636 W HCPCS: Performed by: ANESTHESIOLOGY

## 2024-05-21 PROCEDURE — C2628 CATHETER, OCCLUSION: HCPCS | Performed by: STUDENT IN AN ORGANIZED HEALTH CARE EDUCATION/TRAINING PROGRAM

## 2024-05-21 PROCEDURE — 36415 COLL VENOUS BLD VENIPUNCTURE: CPT | Performed by: STUDENT IN AN ORGANIZED HEALTH CARE EDUCATION/TRAINING PROGRAM

## 2024-05-21 PROCEDURE — 71000016 HC POSTOP RECOV ADDL HR: Performed by: STUDENT IN AN ORGANIZED HEALTH CARE EDUCATION/TRAINING PROGRAM

## 2024-05-21 PROCEDURE — 63600175 PHARM REV CODE 636 W HCPCS: Performed by: STUDENT IN AN ORGANIZED HEALTH CARE EDUCATION/TRAINING PROGRAM

## 2024-05-21 PROCEDURE — 36000711: Performed by: STUDENT IN AN ORGANIZED HEALTH CARE EDUCATION/TRAINING PROGRAM

## 2024-05-21 PROCEDURE — 86901 BLOOD TYPING SEROLOGIC RH(D): CPT | Performed by: STUDENT IN AN ORGANIZED HEALTH CARE EDUCATION/TRAINING PROGRAM

## 2024-05-21 PROCEDURE — 63600175 PHARM REV CODE 636 W HCPCS: Mod: JZ,JG | Performed by: ANESTHESIOLOGY

## 2024-05-21 PROCEDURE — 37000008 HC ANESTHESIA 1ST 15 MINUTES: Performed by: STUDENT IN AN ORGANIZED HEALTH CARE EDUCATION/TRAINING PROGRAM

## 2024-05-21 PROCEDURE — D9220A PRA ANESTHESIA: Mod: ANES,,, | Performed by: ANESTHESIOLOGY

## 2024-05-21 RX ORDER — SODIUM CHLORIDE, SODIUM LACTATE, POTASSIUM CHLORIDE, CALCIUM CHLORIDE 600; 310; 30; 20 MG/100ML; MG/100ML; MG/100ML; MG/100ML
INJECTION, SOLUTION INTRAVENOUS CONTINUOUS
Status: DISCONTINUED | OUTPATIENT
Start: 2024-05-21 | End: 2024-05-21 | Stop reason: HOSPADM

## 2024-05-21 RX ORDER — CEFAZOLIN SODIUM 1 G/3ML
2 INJECTION, POWDER, FOR SOLUTION INTRAMUSCULAR; INTRAVENOUS
Status: COMPLETED | OUTPATIENT
Start: 2024-05-21 | End: 2024-05-21

## 2024-05-21 RX ORDER — PROCHLORPERAZINE EDISYLATE 5 MG/ML
5 INJECTION INTRAMUSCULAR; INTRAVENOUS EVERY 10 MIN PRN
Status: DISCONTINUED | OUTPATIENT
Start: 2024-05-21 | End: 2024-05-21 | Stop reason: HOSPADM

## 2024-05-21 RX ORDER — SODIUM CHLORIDE 0.9 % (FLUSH) 0.9 %
3 SYRINGE (ML) INJECTION
Status: DISCONTINUED | OUTPATIENT
Start: 2024-05-21 | End: 2024-05-21 | Stop reason: HOSPADM

## 2024-05-21 RX ORDER — DEXAMETHASONE SODIUM PHOSPHATE 4 MG/ML
INJECTION, SOLUTION INTRA-ARTICULAR; INTRALESIONAL; INTRAMUSCULAR; INTRAVENOUS; SOFT TISSUE
Status: DISCONTINUED | OUTPATIENT
Start: 2024-05-21 | End: 2024-05-21

## 2024-05-21 RX ORDER — IBUPROFEN 800 MG/1
800 TABLET ORAL EVERY 8 HOURS PRN
Qty: 30 TABLET | Refills: 0 | Status: SHIPPED | OUTPATIENT
Start: 2024-05-21 | End: 2024-06-03

## 2024-05-21 RX ORDER — OXYCODONE HYDROCHLORIDE 5 MG/1
5 TABLET ORAL
Status: DISCONTINUED | OUTPATIENT
Start: 2024-05-21 | End: 2024-05-21 | Stop reason: HOSPADM

## 2024-05-21 RX ORDER — HYDROCODONE BITARTRATE AND ACETAMINOPHEN 5; 325 MG/1; MG/1
1 TABLET ORAL EVERY 4 HOURS PRN
Status: DISCONTINUED | OUTPATIENT
Start: 2024-05-21 | End: 2024-05-21 | Stop reason: HOSPADM

## 2024-05-21 RX ORDER — MUPIROCIN 20 MG/G
OINTMENT TOPICAL
Status: COMPLETED | OUTPATIENT
Start: 2024-05-21 | End: 2024-05-21

## 2024-05-21 RX ORDER — LIDOCAINE HYDROCHLORIDE 10 MG/ML
0.5 INJECTION, SOLUTION EPIDURAL; INFILTRATION; INTRACAUDAL; PERINEURAL
Status: DISCONTINUED | OUTPATIENT
Start: 2024-05-21 | End: 2024-05-21 | Stop reason: HOSPADM

## 2024-05-21 RX ORDER — FAMOTIDINE 20 MG/1
20 TABLET, FILM COATED ORAL
Status: COMPLETED | OUTPATIENT
Start: 2024-05-21 | End: 2024-05-21

## 2024-05-21 RX ORDER — ACETAMINOPHEN 500 MG
1000 TABLET ORAL
Status: COMPLETED | OUTPATIENT
Start: 2024-05-21 | End: 2024-05-21

## 2024-05-21 RX ORDER — MIDAZOLAM HYDROCHLORIDE 1 MG/ML
INJECTION INTRAMUSCULAR; INTRAVENOUS
Status: DISCONTINUED | OUTPATIENT
Start: 2024-05-21 | End: 2024-05-21

## 2024-05-21 RX ORDER — SODIUM CHLORIDE, SODIUM LACTATE, POTASSIUM CHLORIDE, CALCIUM CHLORIDE 600; 310; 30; 20 MG/100ML; MG/100ML; MG/100ML; MG/100ML
INJECTION, SOLUTION INTRAVENOUS CONTINUOUS
Status: DISCONTINUED | OUTPATIENT
Start: 2024-05-21 | End: 2024-05-21

## 2024-05-21 RX ORDER — CYCLOBENZAPRINE HCL 5 MG
10 TABLET ORAL ONCE
Status: COMPLETED | OUTPATIENT
Start: 2024-05-21 | End: 2024-05-21

## 2024-05-21 RX ORDER — BUPIVACAINE HYDROCHLORIDE 2.5 MG/ML
INJECTION, SOLUTION EPIDURAL; INFILTRATION; INTRACAUDAL
Status: COMPLETED | OUTPATIENT
Start: 2024-05-21 | End: 2024-05-21

## 2024-05-21 RX ORDER — FENTANYL CITRATE 50 UG/ML
INJECTION, SOLUTION INTRAMUSCULAR; INTRAVENOUS
Status: DISCONTINUED | OUTPATIENT
Start: 2024-05-21 | End: 2024-05-21

## 2024-05-21 RX ORDER — KETAMINE HCL IN 0.9 % NACL 50 MG/5 ML
SYRINGE (ML) INTRAVENOUS
Status: DISCONTINUED | OUTPATIENT
Start: 2024-05-21 | End: 2024-05-21

## 2024-05-21 RX ORDER — MEPERIDINE HYDROCHLORIDE 25 MG/ML
12.5 INJECTION INTRAMUSCULAR; INTRAVENOUS; SUBCUTANEOUS ONCE AS NEEDED
Status: DISCONTINUED | OUTPATIENT
Start: 2024-05-21 | End: 2024-05-21 | Stop reason: HOSPADM

## 2024-05-21 RX ORDER — ONDANSETRON HYDROCHLORIDE 2 MG/ML
4 INJECTION, SOLUTION INTRAVENOUS DAILY PRN
Status: DISCONTINUED | OUTPATIENT
Start: 2024-05-21 | End: 2024-05-21 | Stop reason: HOSPADM

## 2024-05-21 RX ORDER — PROPOFOL 10 MG/ML
VIAL (ML) INTRAVENOUS
Status: DISCONTINUED | OUTPATIENT
Start: 2024-05-21 | End: 2024-05-21

## 2024-05-21 RX ORDER — ROCURONIUM BROMIDE 10 MG/ML
INJECTION, SOLUTION INTRAVENOUS
Status: DISCONTINUED | OUTPATIENT
Start: 2024-05-21 | End: 2024-05-21

## 2024-05-21 RX ORDER — HYDROMORPHONE HYDROCHLORIDE 2 MG/ML
0.2 INJECTION, SOLUTION INTRAMUSCULAR; INTRAVENOUS; SUBCUTANEOUS EVERY 5 MIN PRN
Status: DISCONTINUED | OUTPATIENT
Start: 2024-05-21 | End: 2024-05-21

## 2024-05-21 RX ORDER — LIDOCAINE HYDROCHLORIDE 20 MG/ML
INJECTION INTRAVENOUS
Status: DISCONTINUED | OUTPATIENT
Start: 2024-05-21 | End: 2024-05-21

## 2024-05-21 RX ORDER — ONDANSETRON HYDROCHLORIDE 2 MG/ML
INJECTION, SOLUTION INTRAVENOUS
Status: DISCONTINUED | OUTPATIENT
Start: 2024-05-21 | End: 2024-05-21

## 2024-05-21 RX ORDER — LIDOCAINE HYDROCHLORIDE 10 MG/ML
0.5 INJECTION, SOLUTION EPIDURAL; INFILTRATION; INTRACAUDAL; PERINEURAL ONCE
Status: DISCONTINUED | OUTPATIENT
Start: 2024-05-21 | End: 2024-05-21 | Stop reason: HOSPADM

## 2024-05-21 RX ORDER — ALBUMIN HUMAN 50 G/1000ML
SOLUTION INTRAVENOUS
Status: DISCONTINUED | OUTPATIENT
Start: 2024-05-21 | End: 2024-05-21

## 2024-05-21 RX ORDER — PROCHLORPERAZINE EDISYLATE 5 MG/ML
5 INJECTION INTRAMUSCULAR; INTRAVENOUS EVERY 30 MIN PRN
Status: DISCONTINUED | OUTPATIENT
Start: 2024-05-21 | End: 2024-05-21 | Stop reason: HOSPADM

## 2024-05-21 RX ORDER — HYDROCODONE BITARTRATE AND ACETAMINOPHEN 5; 325 MG/1; MG/1
1 TABLET ORAL EVERY 4 HOURS PRN
Qty: 20 TABLET | Refills: 0 | Status: SHIPPED | OUTPATIENT
Start: 2024-05-21 | End: 2024-06-03

## 2024-05-21 RX ORDER — CELECOXIB 200 MG/1
400 CAPSULE ORAL
Status: COMPLETED | OUTPATIENT
Start: 2024-05-21 | End: 2024-05-21

## 2024-05-21 RX ORDER — HYDROMORPHONE HYDROCHLORIDE 2 MG/ML
0.4 INJECTION, SOLUTION INTRAMUSCULAR; INTRAVENOUS; SUBCUTANEOUS EVERY 5 MIN PRN
Status: DISCONTINUED | OUTPATIENT
Start: 2024-05-21 | End: 2024-05-21 | Stop reason: HOSPADM

## 2024-05-21 RX ORDER — HYDROMORPHONE HYDROCHLORIDE 2 MG/ML
0.4 INJECTION, SOLUTION INTRAMUSCULAR; INTRAVENOUS; SUBCUTANEOUS EVERY 5 MIN PRN
Status: DISCONTINUED | OUTPATIENT
Start: 2024-05-21 | End: 2024-05-21

## 2024-05-21 RX ORDER — PREGABALIN 50 MG/1
50 CAPSULE ORAL
Status: COMPLETED | OUTPATIENT
Start: 2024-05-21 | End: 2024-05-21

## 2024-05-21 RX ADMIN — PREGABALIN 50 MG: 50 CAPSULE ORAL at 05:05

## 2024-05-21 RX ADMIN — FAMOTIDINE 20 MG: 20 TABLET ORAL at 05:05

## 2024-05-21 RX ADMIN — CARBOXYMETHYLCELLULOSE SODIUM 2 DROP: 2.5 SOLUTION/ DROPS OPHTHALMIC at 07:05

## 2024-05-21 RX ADMIN — HYDROMORPHONE HYDROCHLORIDE 0.4 MG: 2 INJECTION INTRAMUSCULAR; INTRAVENOUS; SUBCUTANEOUS at 10:05

## 2024-05-21 RX ADMIN — ALBUMIN (HUMAN) 150 ML: 12.5 SOLUTION INTRAVENOUS at 08:05

## 2024-05-21 RX ADMIN — OXYCODONE HYDROCHLORIDE 5 MG: 5 TABLET ORAL at 09:05

## 2024-05-21 RX ADMIN — CYCLOBENZAPRINE HYDROCHLORIDE 10 MG: 5 TABLET, FILM COATED ORAL at 10:05

## 2024-05-21 RX ADMIN — Medication 50 MG: at 07:05

## 2024-05-21 RX ADMIN — LIDOCAINE HYDROCHLORIDE 100 MG: 20 INJECTION, SOLUTION INTRAVENOUS at 07:05

## 2024-05-21 RX ADMIN — BUPIVACAINE 20 ML: 13.3 INJECTION, SUSPENSION, LIPOSOMAL INFILTRATION at 06:05

## 2024-05-21 RX ADMIN — FENTANYL CITRATE 100 MCG: 50 INJECTION, SOLUTION INTRAMUSCULAR; INTRAVENOUS at 07:05

## 2024-05-21 RX ADMIN — MIDAZOLAM HYDROCHLORIDE 2 MG: 1 INJECTION INTRAMUSCULAR; INTRAVENOUS at 06:05

## 2024-05-21 RX ADMIN — ONDANSETRON HYDROCHLORIDE 4 MG: 2 INJECTION INTRAMUSCULAR; INTRAVENOUS at 09:05

## 2024-05-21 RX ADMIN — DEXAMETHASONE SODIUM PHOSPHATE 4 MG: 4 INJECTION, SOLUTION INTRAMUSCULAR; INTRAVENOUS at 07:05

## 2024-05-21 RX ADMIN — ROCURONIUM BROMIDE 25 MG: 10 SOLUTION INTRAVENOUS at 08:05

## 2024-05-21 RX ADMIN — ALBUMIN (HUMAN) 500 ML: 12.5 SOLUTION INTRAVENOUS at 09:05

## 2024-05-21 RX ADMIN — FENTANYL CITRATE 50 MCG: 50 INJECTION, SOLUTION INTRAMUSCULAR; INTRAVENOUS at 07:05

## 2024-05-21 RX ADMIN — HYDROMORPHONE HYDROCHLORIDE 0.4 MG: 2 INJECTION INTRAMUSCULAR; INTRAVENOUS; SUBCUTANEOUS at 09:05

## 2024-05-21 RX ADMIN — FENTANYL CITRATE 50 MCG: 50 INJECTION, SOLUTION INTRAMUSCULAR; INTRAVENOUS at 09:05

## 2024-05-21 RX ADMIN — ALBUMIN (HUMAN) 50 ML: 12.5 SOLUTION INTRAVENOUS at 07:05

## 2024-05-21 RX ADMIN — PROPOFOL 200 MG: 10 INJECTION, EMULSION INTRAVENOUS at 07:05

## 2024-05-21 RX ADMIN — SUGAMMADEX 243 MG: 100 INJECTION, SOLUTION INTRAVENOUS at 09:05

## 2024-05-21 RX ADMIN — MUPIROCIN: 20 OINTMENT TOPICAL at 05:05

## 2024-05-21 RX ADMIN — CELECOXIB 400 MG: 200 CAPSULE ORAL at 05:05

## 2024-05-21 RX ADMIN — ALBUMIN (HUMAN) 200 ML: 12.5 SOLUTION INTRAVENOUS at 08:05

## 2024-05-21 RX ADMIN — ALBUMIN (HUMAN) 100 ML: 12.5 SOLUTION INTRAVENOUS at 07:05

## 2024-05-21 RX ADMIN — GLYCOPYRROLATE 0.2 MG: 0.2 INJECTION, SOLUTION INTRAMUSCULAR; INTRAVITREAL at 07:05

## 2024-05-21 RX ADMIN — ROCURONIUM BROMIDE 50 MG: 10 SOLUTION INTRAVENOUS at 07:05

## 2024-05-21 RX ADMIN — SODIUM CHLORIDE, SODIUM LACTATE, POTASSIUM CHLORIDE, AND CALCIUM CHLORIDE: 600; 310; 30; 20 INJECTION, SOLUTION INTRAVENOUS at 06:05

## 2024-05-21 RX ADMIN — CEFAZOLIN 2 G: 330 INJECTION, POWDER, FOR SOLUTION INTRAMUSCULAR; INTRAVENOUS at 07:05

## 2024-05-21 RX ADMIN — ACETAMINOPHEN 1000 MG: 500 TABLET ORAL at 05:05

## 2024-05-21 RX ADMIN — OXYCODONE HYDROCHLORIDE 5 MG: 5 TABLET ORAL at 12:05

## 2024-05-21 RX ADMIN — FENTANYL CITRATE 100 MCG: 50 INJECTION, SOLUTION INTRAMUSCULAR; INTRAVENOUS at 06:05

## 2024-05-21 RX ADMIN — BUPIVACAINE HYDROCHLORIDE 40 ML: 2.5 INJECTION, SOLUTION EPIDURAL; INFILTRATION; INTRACAUDAL; PERINEURAL at 06:05

## 2024-05-21 NOTE — ANESTHESIA POSTPROCEDURE EVALUATION
Anesthesia Post Evaluation    Patient: Mary Kate Lima    Procedure(s) Performed: Procedure(s) (LRB):  HYSTERECTOMY, TOTAL, LAPAROSCOPIC (N/A)  SALPINGECTOMY, LAPAROSCOPIC (Bilateral)  CYSTOSCOPY    Final Anesthesia Type: general      Patient location during evaluation: PACU  Patient participation: Yes- Able to Participate  Level of consciousness: awake and alert  Post-procedure vital signs: reviewed and stable  Pain management: adequate  Airway patency: patent    PONV status at discharge: No PONV  Anesthetic complications: no      Cardiovascular status: blood pressure returned to baseline  Respiratory status: unassisted  Hydration status: euvolemic  Follow-up not needed.              Vitals Value Taken Time   /64 05/21/24 1145   Temp 36.6 °C (97.9 °F) 05/21/24 1045   Pulse 87 05/21/24 1145   Resp 18 05/21/24 1200   SpO2 100 % 05/21/24 1145         Event Time   Out of Recovery 10:40:00         Pain/Yesi Score: Pain Rating Prior to Med Admin: 5 (5/21/2024 12:00 PM)  Yesi Score: 10 (5/21/2024 11:45 AM)

## 2024-05-21 NOTE — OR NURSING
VSS on RA. Pain is tolerable per pt. X4 lap sites with steri strips and bandaids, CDI. PIV CDI. Meets PACU discharge criteria. Ready for transfer to phase II. Family notified.

## 2024-05-21 NOTE — ANESTHESIA PREPROCEDURE EVALUATION
05/21/2024  Mary Kate Lima is a 48 y.o., female.      Pre-op Assessment    I have reviewed the Patient Summary Reports.     I have reviewed the Nursing Notes. I have reviewed the NPO Status.   I have reviewed the Medications.     Review of Systems  Anesthesia Hx:  No problems with previous Anesthesia             Denies Family Hx of Anesthesia complications.    Denies Personal Hx of Anesthesia complications.                    Social:  Non-Smoker       Hematology/Oncology:  Hematology Normal   Oncology Normal                                   Cardiovascular:  Cardiovascular Normal Exercise tolerance: good                                           Pulmonary:  Pulmonary Normal                       Renal/:  Renal/ Normal                 Hepatic/GI:  Hepatic/GI Normal                 Musculoskeletal:  Musculoskeletal Normal                Neurological:  Neurology Normal                                      Endocrine:  Endocrine Normal            Psych:   anxiety                 Physical Exam  General: Well nourished and Cooperative    Airway:  Mallampati: II   Mouth Opening: Normal  TM Distance: Normal  Neck ROM: Normal ROM    Dental:  Intact      Anesthesia Plan  Type of Anesthesia, risks & benefits discussed:    Anesthesia Type: Gen Supraglottic Airway  Intra-op Monitoring Plan: Standard ASA Monitors  Post Op Pain Control Plan: multimodal analgesia  Induction:  IV  Airway Plan: Video  Informed Consent: Informed consent signed with the Patient and all parties understand the risks and agree with anesthesia plan.  All questions answered.   ASA Score: 2  Day of Surgery Review of History & Physical: H&P Update referred to the surgeon/provider.    Ready For Surgery From Anesthesia Perspective.     .

## 2024-05-21 NOTE — ANESTHESIA PROCEDURE NOTES
Intubation    Date/Time: 5/21/2024 7:06 AM    Performed by: Matt Farnsworth CRNA  Authorized by: Matt Farnsworth CRNA    Intubation:     Induction:  Intravenous    Intubated:  Postinduction    Mask Ventilation:  Easy mask    Attempts:  1    Attempted By:  CRNA    Method of Intubation:  Video laryngoscopy    Blade:  Ortiz 3    Laryngeal View Grade: Grade I - full view of cords      Difficult Airway Encountered?: No      Complications:  None    Airway Device:  Oral endotracheal tube    Airway Device Size:  7.5    Style/Cuff Inflation:  Cuffed    Inflation Amount (mL):  4    Tube secured:  22    Secured at:  The lips    Placement Verified By:  Capnometry    Complicating Factors:  None    Findings Post-Intubation:  BS equal bilateral and atraumatic/condition of teeth unchanged

## 2024-05-21 NOTE — TRANSFER OF CARE
"Anesthesia Transfer of Care Note    Patient: Mary Kate Lima    Procedure(s) Performed: Procedure(s) (LRB):  HYSTERECTOMY, TOTAL, LAPAROSCOPIC (N/A)  SALPINGECTOMY, LAPAROSCOPIC (Bilateral)  CYSTOSCOPY    Patient location: PACU    Anesthesia Type: general    Transport from OR: Transported from OR on 2-3 L/min O2 by NC with adequate spontaneous ventilation    Post pain: adequate analgesia    Post assessment: no apparent anesthetic complications    Post vital signs: stable    Level of consciousness: awake    Nausea/Vomiting: no nausea/vomiting    Complications: none    Transfer of care protocol was followed    Last vitals: Visit Vitals  /70 (BP Location: Right arm, Patient Position: Lying)   Pulse (!) 59   Temp 36.5 °C (97.7 °F) (Oral)   Resp 18   Ht 5' 6" (1.676 m)   Wt 60.8 kg (134 lb)   LMP 05/08/2024 (Approximate)   SpO2 100%   Breastfeeding No   BMI 21.63 kg/m²     "

## 2024-05-21 NOTE — DISCHARGE INSTRUCTIONS
Discharge Instructions for Laparoscopic Hysterectomy  You had a procedure called laparoscopic hysterectomy. A surgeon removed your uterus using instruments inserted through small incisions in your abdomen. These incisions may be tender or sore. You may also have pain in your upper back or shoulders. This is from the gas used to enlarge your abdomen to allow your doctor to see inside your pelvis and perform the procedure. This pain usually goes away in a day or two. It usually takes from 1-4 weeks to recover from laparoscopic hysterectomy. Remember, though, that recovery time varies from woman to woman. Here's what you can do to speed your recovery following surgery.    Home Care   Continue the coughing and deep breathing exercises that you learned in the hospital.  Take your medications exactly as directed by your doctor.  Avoid constipation.  Eat fruits, vegetables, and whole grains.  Drink 6-8 glasses of water a day, unless told to do otherwise.  Use a laxative or a mild stool softener if your doctor says it's okay.    You may shower in 24 hours and get your incisions wet. Dab your incisions dry with a clean towel.  It is OK if the pieces of tape come off, (steri strips). If they are still in place 1 week after surgery, you should take them off.  You should call your doctor if the incisions become reddened or have foul smelling or bloody drainage. Avoid baths, swimming pools and hot tubs until seen by your physician for a post-op follow up.    Don't use oils, powders, or lotions on your incisions.  You should not have any sexual activity for six weeks.  This can cause severe bleeding. You should not insert anything into the vagina for six weeks, no tampons or douching.  If you had both ovaries removed, report hot flashes, mood swings, and irritability to your doctor. There may be medications that can help you.    Activity  Ask your doctor when you can start driving again. It's usually okay to drive as soon  as you are free of pain and able to move comfortably from side to side. Don't drive while you are still taking narcotic pain medications.  Ask others to help with chores and errands while you recover.  Dont lift anything heavier than 10 pounds for 4 weeks.  Dont vacuum or do other strenuous activities until the doctor says it's okay.  Walk as often as you feel able.  Climb stairs slowly and pause after every few steps.    Follow-Up  Make a follow-up appointment as directed by our staff.     When to Call Your Doctor  Call your doctor right away if you have any of the following:  Fever above 100.4°F (38°C) or chills  Bright red vaginal bleeding or vaginal bleeding that soaks more than one sanitary pad per hour  A foul smelling discharge from the vagina  Trouble urinating or burning when you urinate  Severe pain or bloating in your abdomen  Redness, swelling, or drainage at your incision sites  Shortness of breath or chest pain           Your Surgeon Gave You EXPAREL® (bupivacaine liposome injectable suspension)    To help control your pain after surgery, your surgeon injected EXPAREL into your surgical incision just before the end of the procedure.   EXPAREL is a local analgesic that contains the local anesthetic bupivacaine. Local anesthetics provide pain relief by numbing the tissue  around the surgical site.   EXPAREL is specifically designed to release pain medication over time and can control pain for up to 72 hours.   In addition to EXPAREL, your surgeon may provide other pain medications to control your pain.   Each patient is different and responds differently to painmedication. Depending on how you respond to EXPAREL, you may require less  additional pain medication during your recovery.    Possible Side Effects    Side effects can occur with any medication and it is important not to ignore anything you may be experiencing. Some patients who  received EXPAREL experienced nausea, vomiting, or constipation.  Rarely, patients who receive bupivacaine (the active ingredient in  EXPAREL) have experienced numbness and tingling in their mouth or lips, lightheadedness, or anxiety. Speak with your doctor right  away if you think you may be experiencing any of these sensations, or if you have other questions regarding possible side effects.    Your Recovery    When your pain is under control, your body can better focus on healing. This is not the time to test your pain tolerance, or grin and  bear it.Work with your surgeon and nurse to make your recovery as speedy and pain-free as possible.   Follow the post-op orders your nurse gave you.   Eat a healthy diet and drink plenty of water. Surgery stresses your body; your body responds by needing more energy to heal.      Important Information About EXPAREL  Products that contain bupivacaine, like EXPAREL, may cause a temporary loss of  sensation or the ability to move in the area where bupivacaine was injected.    Date Administered: 05/21/2024      Other Forms of Bupivicaine should not be administered within 96hrs following administration of EXPAREL.

## 2024-05-21 NOTE — OPERATIVE NOTE ADDENDUM
Certification of Assistant at Surgery       Surgery Date: 5/21/2024     Participating Surgeons:  Surgeons and Role:     * Tg Smith MD - Primary     * Aliyah Chavez MD - Assisting    Procedures:  Procedure(s) (LRB):  HYSTERECTOMY, TOTAL, LAPAROSCOPIC (N/A)  SALPINGECTOMY, LAPAROSCOPIC (Bilateral)  CYSTOSCOPY    Assistant Surgeon's Certification of Necessity:  I understand that section 1842 (b) (6) (d) of the Social Security Act generally prohibits Medicare Part B reasonable charge payment for the services of assistants at surgery in teaching hospitals when qualified residents are available to furnish such services. I certify that the services for which payment is claimed were medically necessary, and that no qualified resident was available to perform the services. I further understand that these services are subject to post-payment review by the Medicare carrier.      Aliyah Chavez MD    05/21/2024  9:21 AM

## 2024-05-21 NOTE — ANESTHESIA PROCEDURE NOTES
Bilateral TAP    Patient location during procedure: holding area   Block not for primary anesthetic.  Reason for block: at surgeon's request and post-op pain management   Post-op Pain Location: abdomen   Timeout: 5/21/2024 6:32 AM   End time: 5/21/2024 6:39 AM    Staffing  Authorizing Provider: Adan Kimble MD  Performing Provider: Adan Kimble MD    Staffing  Performed by: Adan Kimble MD  Authorized by: Adan Kimble MD    Preanesthetic Checklist  Completed: patient identified, IV checked, site marked, risks and benefits discussed, surgical consent, monitors and equipment checked, pre-op evaluation and timeout performed  Peripheral Block  Patient position: supine  Prep: ChloraPrep and site prepped and draped  Patient monitoring: heart rate and continuous pulse ox  Block type: TAP  Laterality: bilateral  Injection technique: single shot  Needle  Needle type: Echogenic   Needle gauge: 20 G  Needle length: 4 in  Needle localization: anatomical landmarks and ultrasound guidance   -ultrasound image captured on disc.  Assessment  Injection assessment: negative aspiration, negative parasthesia and local visualized surrounding nerve  Paresthesia pain: none  Heart rate change: no  Slow fractionated injection: yes  Pain Tolerance: comfortable throughout block and no complaints  Medications:    Medications: bupivacaine (pf) (MARCAINE) injection 0.25% - Perineural   40 mL - 5/21/2024 6:39:00 AM    Additional Notes  Local visualized to spread between internal oblique and transversus abdominis  Each side injected with 20 cc 0.25% Marcaine plus 10 cc Exparel

## 2024-05-21 NOTE — OP NOTE
[facility]  Operative Note    SUMMARY     Date of Procedure: 5/21/2024     Procedure: Procedure(s) (LRB):  HYSTERECTOMY, TOTAL, LAPAROSCOPIC (N/A)  SALPINGECTOMY, LAPAROSCOPIC (Bilateral)  CYSTOSCOPY       Surgeon: Tg Smith M.D.    Assistant:  Aliyah Chavez M.D.    Pre-Operative Diagnosis: Abnormal uterine bleeding (AUB) [N93.9]    Post-Operative Diagnosis: Post-Op Diagnosis Codes:     * Abnormal uterine bleeding (AUB) [N93.9]    Anesthesia: General    Complications: No    Estimated Blood Loss (EBL): 150 mL    Findings: Intraoperative findings revealed uterus 12 week size with multiple fibroids.  Fallopian tube segments with evidence of prior BTL.  Normal appearing ovaries.  Dense scarring of bladder to lower uterine segment.      Procedure in Detail:  Ms. Lima was taken to the operating room with IVFs running and bilateral SCDs in place.  General anesthesia was obtained without difficulty. The patient was then prepped and draped in the normal sterile fashion. She was placed in the dorsal lithotomy position in Christopher stirrups. Her arms were tucked in the usual fashion. A time out was performed.  The cervix was grasped with a single tooth tenaculum and stay sutures placed at 3 & 9 o'clock. Uterus sounded to 10 cm. 10 cm JODEE tip and medium Koh cup were then assembled and placed in and around the cervix. A fowler was placed to gravity.         Attention was then turned abdomen, and a 5 mm umbilical skin incision was made with a knife. Towel clamps were used to elevate the abdomen. A 5 mm visiport trocar was used to enter the abdomen under direct visualization. Intraperitoneal placement was confirmed.   The abdomen was then insufflated to 15 mmHg. The patient was then placed in trendelenburg position. A 5 mm bladeless trocar was placed in the LLQ in the usual fashion.  A 5 mm bladeless trocar was placed in the LLQ lateral to the umbilicus in the usual fashion.  A 5 mm   bladeless trocar was placed in the RLQ in the usual fashion.       The abdomen was then evaluated and the findings were as noted above. Using a ligasure, the left fallopian tube was excised from the ovary.  The right fallopian tube was then excised from the right ovary. The bladder was then backfilled at this point to further delineate the surgical plane.  The left round ligament, utero-ovarian ligament and cardinal ligaments were taken down using the ligasure to the level of the uterine arteries. This was then repeated on the right. Dense adhesions were carefully lysed using electro cautery to dissect the bladder from the lower uterine segment.  The fowler catheter was then reattached and the bladder drained.  The bilateral uterine arteries were then ligated with the ligasure and the colpotomy was then created using monopolar cautery. The uterus and bilateral fallopian tubes were then pulled out via the vagina.        The v-lock suture was brought through the vagina into the abdominal cavity and grasped with the laparoscopic needle .  A glove with sponge was placed vaginally to maintain the pneumoperitoneum.  The vaginal cuff was then closed laparoscopically using the v-lock suture.  The uterosacral ligaments were incorporated into the vaginal cuff closure.  Good hemostasis was noted at the cuff and at all pedicles. The intra-abdominal pressure was lowered and good hemostasis was maintained.  The trocars were then removed. The skin incisions were closed using 4-0 Monocryl.      Cystoscopy was then performed. Brisk efflux of urine from bilateral ureteral orifices was noted.  Bladder dome and mucosa were intact without evidence of lesion, defect, or suture.  The instruments were removed from the vagina with excellent hemostasis and no active bleeding was noted. The patient tolerated the procedure well. Sponge lap and needle counts correct x 2.     A qualified resident was not available for this procedure.

## 2024-05-22 ENCOUNTER — PATIENT MESSAGE (OUTPATIENT)
Dept: OBSTETRICS AND GYNECOLOGY | Facility: CLINIC | Age: 49
End: 2024-05-22
Payer: COMMERCIAL

## 2024-05-22 VITALS
OXYGEN SATURATION: 100 % | HEIGHT: 66 IN | WEIGHT: 134 LBS | SYSTOLIC BLOOD PRESSURE: 113 MMHG | RESPIRATION RATE: 18 BRPM | HEART RATE: 87 BPM | DIASTOLIC BLOOD PRESSURE: 64 MMHG | TEMPERATURE: 98 F | BODY MASS INDEX: 21.53 KG/M2

## 2024-05-23 ENCOUNTER — TELEPHONE (OUTPATIENT)
Dept: OBSTETRICS AND GYNECOLOGY | Facility: CLINIC | Age: 49
End: 2024-05-23
Payer: COMMERCIAL

## 2024-05-23 NOTE — TELEPHONE ENCOUNTER
Phone call made to patient to follow up after surgery.  She is doing well.  No issues or concerns.  Ambulating, voiding, tolerating diet, passing flatus.  Still with some gas pain.  Has not taken anything this am.  Reviewed meds to take - Pain well controlled with meds.  On bowel regimen.  She will call with any issus or concerns.  All questions answered.

## 2024-05-24 LAB
FINAL PATHOLOGIC DIAGNOSIS: NORMAL
GROSS: NORMAL
Lab: NORMAL

## 2024-05-25 ENCOUNTER — PATIENT MESSAGE (OUTPATIENT)
Dept: OBSTETRICS AND GYNECOLOGY | Facility: CLINIC | Age: 49
End: 2024-05-25
Payer: COMMERCIAL

## 2024-05-27 ENCOUNTER — OFFICE VISIT (OUTPATIENT)
Dept: OBSTETRICS AND GYNECOLOGY | Facility: CLINIC | Age: 49
End: 2024-05-27
Attending: STUDENT IN AN ORGANIZED HEALTH CARE EDUCATION/TRAINING PROGRAM
Payer: COMMERCIAL

## 2024-05-27 ENCOUNTER — TELEPHONE (OUTPATIENT)
Dept: OBSTETRICS AND GYNECOLOGY | Facility: CLINIC | Age: 49
End: 2024-05-27

## 2024-05-27 VITALS
DIASTOLIC BLOOD PRESSURE: 64 MMHG | BODY MASS INDEX: 21.25 KG/M2 | SYSTOLIC BLOOD PRESSURE: 114 MMHG | WEIGHT: 132.25 LBS | HEIGHT: 66 IN

## 2024-05-27 DIAGNOSIS — Z98.890 POST-OPERATIVE STATE: Primary | ICD-10-CM

## 2024-05-27 PROCEDURE — 1159F MED LIST DOCD IN RCRD: CPT | Mod: CPTII,S$GLB,, | Performed by: STUDENT IN AN ORGANIZED HEALTH CARE EDUCATION/TRAINING PROGRAM

## 2024-05-27 PROCEDURE — 99024 POSTOP FOLLOW-UP VISIT: CPT | Mod: S$GLB,,, | Performed by: STUDENT IN AN ORGANIZED HEALTH CARE EDUCATION/TRAINING PROGRAM

## 2024-05-27 PROCEDURE — 3074F SYST BP LT 130 MM HG: CPT | Mod: CPTII,S$GLB,, | Performed by: STUDENT IN AN ORGANIZED HEALTH CARE EDUCATION/TRAINING PROGRAM

## 2024-05-27 PROCEDURE — 3078F DIAST BP <80 MM HG: CPT | Mod: CPTII,S$GLB,, | Performed by: STUDENT IN AN ORGANIZED HEALTH CARE EDUCATION/TRAINING PROGRAM

## 2024-05-27 PROCEDURE — 99999 PR PBB SHADOW E&M-EST. PATIENT-LVL III: CPT | Mod: PBBFAC,,, | Performed by: STUDENT IN AN ORGANIZED HEALTH CARE EDUCATION/TRAINING PROGRAM

## 2024-05-27 NOTE — PROGRESS NOTES
"  Subjective:       Mary Kate Lima is a 48 y.o.  who presents to the clinic 1 weeks status post total laparoscopic hysterectomy for abnormal uterine bleeding. Eating a regular diet without difficulty. Bowel movements are normal. Some back pain - taking ibuprofen occasionally.  Has had some bruising on her R side.  No bleeding.    The patient's allergies, and past medical and surgical histories were reviewed.    Review of Systems  Pertinent items are noted in HPI.      Objective:      /64   Ht 5' 6" (1.676 m)   Wt 60 kg (132 lb 4.4 oz)   LMP 2024 (Exact Date)   BMI 21.35 kg/m²     APPEARANCE: Well nourished, well developed, in no acute distress.  PSYCH: Appropriate mood and affect.  NECK:  Supple, no thyromegaly.  BREASTS:  No masses, no nipple discharge.  CARDIOVASCULAR:  Regular rate and rhythm.  LUNGS:  Clear to auscultation bilaterally.  ABDOMEN:  Soft, nontender.  Incision c/d/I, healing well.  Bruising along R side and mons.  No fluctuance, no induration, no erythema.  Lightening and turning yellow.    GENITOURINARY:  deferred  EXTREMITIES: No edema.         Assessment:      Doing well postoperatively.  Operative findings again reviewed. Pathology report discussed.      Plan:      1. Continue any current medications.  Reviewed postop care and instructions.  Bruising improving and patient doing well clinically.  Will continue to monitor closely  2. Wound care discussed.  3. Activity restrictions: no lifting more than 10 pounds  4. Anticipated return to work: 4 weeks.  5. Follow up: 1-2 weeks as scheduled      "

## 2024-05-27 NOTE — TELEPHONE ENCOUNTER
Spoke with patient.  Feeling well.  Will come in today for eval.  Please add ehr to PM at [phone].  She is going to head this way shortly and we can see her when she gets here.

## 2024-06-03 ENCOUNTER — LAB VISIT (OUTPATIENT)
Dept: LAB | Facility: OTHER | Age: 49
End: 2024-06-03
Attending: NURSE PRACTITIONER
Payer: COMMERCIAL

## 2024-06-03 ENCOUNTER — OFFICE VISIT (OUTPATIENT)
Dept: OBSTETRICS AND GYNECOLOGY | Facility: CLINIC | Age: 49
End: 2024-06-03
Attending: STUDENT IN AN ORGANIZED HEALTH CARE EDUCATION/TRAINING PROGRAM
Payer: COMMERCIAL

## 2024-06-03 ENCOUNTER — OFFICE VISIT (OUTPATIENT)
Dept: OBSTETRICS AND GYNECOLOGY | Facility: CLINIC | Age: 49
End: 2024-06-03
Payer: COMMERCIAL

## 2024-06-03 VITALS
HEIGHT: 66 IN | SYSTOLIC BLOOD PRESSURE: 98 MMHG | DIASTOLIC BLOOD PRESSURE: 62 MMHG | WEIGHT: 131.38 LBS | BODY MASS INDEX: 21.11 KG/M2

## 2024-06-03 VITALS
SYSTOLIC BLOOD PRESSURE: 106 MMHG | DIASTOLIC BLOOD PRESSURE: 73 MMHG | BODY MASS INDEX: 21.37 KG/M2 | WEIGHT: 132.38 LBS

## 2024-06-03 DIAGNOSIS — N95.1 MENOPAUSAL SYMPTOMS: Primary | ICD-10-CM

## 2024-06-03 DIAGNOSIS — N95.1 MENOPAUSAL SYMPTOMS: ICD-10-CM

## 2024-06-03 DIAGNOSIS — Z09 POSTOP CHECK: Primary | ICD-10-CM

## 2024-06-03 DIAGNOSIS — Z12.31 SCREENING MAMMOGRAM FOR BREAST CANCER: ICD-10-CM

## 2024-06-03 DIAGNOSIS — Z90.710 HISTORY OF TOTAL HYSTERECTOMY: ICD-10-CM

## 2024-06-03 DIAGNOSIS — R68.82 DECREASED LIBIDO: ICD-10-CM

## 2024-06-03 LAB
ESTRADIOL SERPL-MCNC: <10 PG/ML
FSH SERPL-ACNC: 21.44 MIU/ML
LH SERPL-ACNC: 10.3 MIU/ML
TESTOST SERPL-MCNC: 16 NG/DL (ref 5–73)
TSH SERPL DL<=0.005 MIU/L-ACNC: 0.79 UIU/ML (ref 0.4–4)

## 2024-06-03 PROCEDURE — 84443 ASSAY THYROID STIM HORMONE: CPT | Performed by: NURSE PRACTITIONER

## 2024-06-03 PROCEDURE — 3078F DIAST BP <80 MM HG: CPT | Mod: CPTII,S$GLB,, | Performed by: STUDENT IN AN ORGANIZED HEALTH CARE EDUCATION/TRAINING PROGRAM

## 2024-06-03 PROCEDURE — 84402 ASSAY OF FREE TESTOSTERONE: CPT | Performed by: NURSE PRACTITIONER

## 2024-06-03 PROCEDURE — 1159F MED LIST DOCD IN RCRD: CPT | Mod: CPTII,S$GLB,, | Performed by: NURSE PRACTITIONER

## 2024-06-03 PROCEDURE — 1160F RVW MEDS BY RX/DR IN RCRD: CPT | Mod: CPTII,S$GLB,, | Performed by: NURSE PRACTITIONER

## 2024-06-03 PROCEDURE — 3074F SYST BP LT 130 MM HG: CPT | Mod: CPTII,S$GLB,, | Performed by: STUDENT IN AN ORGANIZED HEALTH CARE EDUCATION/TRAINING PROGRAM

## 2024-06-03 PROCEDURE — 82670 ASSAY OF TOTAL ESTRADIOL: CPT | Performed by: NURSE PRACTITIONER

## 2024-06-03 PROCEDURE — 99999 PR PBB SHADOW E&M-EST. PATIENT-LVL III: CPT | Mod: PBBFAC,,, | Performed by: STUDENT IN AN ORGANIZED HEALTH CARE EDUCATION/TRAINING PROGRAM

## 2024-06-03 PROCEDURE — 3008F BODY MASS INDEX DOCD: CPT | Mod: CPTII,S$GLB,, | Performed by: NURSE PRACTITIONER

## 2024-06-03 PROCEDURE — 3078F DIAST BP <80 MM HG: CPT | Mod: CPTII,S$GLB,, | Performed by: NURSE PRACTITIONER

## 2024-06-03 PROCEDURE — 3074F SYST BP LT 130 MM HG: CPT | Mod: CPTII,S$GLB,, | Performed by: NURSE PRACTITIONER

## 2024-06-03 PROCEDURE — 83001 ASSAY OF GONADOTROPIN (FSH): CPT | Performed by: NURSE PRACTITIONER

## 2024-06-03 PROCEDURE — 83002 ASSAY OF GONADOTROPIN (LH): CPT | Performed by: NURSE PRACTITIONER

## 2024-06-03 PROCEDURE — 99024 POSTOP FOLLOW-UP VISIT: CPT | Mod: S$GLB,,, | Performed by: STUDENT IN AN ORGANIZED HEALTH CARE EDUCATION/TRAINING PROGRAM

## 2024-06-03 PROCEDURE — 99214 OFFICE O/P EST MOD 30 MIN: CPT | Mod: 24,S$GLB,, | Performed by: NURSE PRACTITIONER

## 2024-06-03 PROCEDURE — 84403 ASSAY OF TOTAL TESTOSTERONE: CPT | Performed by: NURSE PRACTITIONER

## 2024-06-03 PROCEDURE — 99999 PR PBB SHADOW E&M-EST. PATIENT-LVL III: CPT | Mod: PBBFAC,,, | Performed by: NURSE PRACTITIONER

## 2024-06-03 NOTE — PROGRESS NOTES
Subjective:      Mary Kate Lima is a 48 y.o. female who presents to discuss hormone replacement therapy. Recent TLH-BS due to AUB 2 weeks ago, she has noted hot flashes/night sweats since surgery. Currently desires treatment of low libido.     The patient is not taking hormone replacement therapy. Patient denies post-menopausal vaginal bleeding. The patient is sexually active.  She denies the following contraindications to HRT:  Vaginal bleeding, history of VTE/PE, thrombophilia,  breast cancer, or active liver disease.       Established with PCP, Dr. Mancilla.     Pap smear: 3/27/2023  Mammogram: 2023      Lab Visit on 06/03/2024   Component Date Value Ref Range Status    TSH 06/03/2024 0.794  0.400 - 4.000 uIU/mL Final   Admission on 05/21/2024, Discharged on 05/21/2024   Component Date Value Ref Range Status    Group & Rh 05/21/2024 O POS   Final    Indirect Gisselle 05/21/2024 NEG   Final    Specimen Outdate 05/21/2024 05/24/2024 23:59   Final    WBC 05/21/2024 4.61  3.90 - 12.70 K/uL Final    RBC 05/21/2024 4.09  4.00 - 5.40 M/uL Final    Hemoglobin 05/21/2024 12.0  12.0 - 16.0 g/dL Final    Hematocrit 05/21/2024 36.5 (L)  37.0 - 48.5 % Final    MCV 05/21/2024 89  82 - 98 fL Final    MCH 05/21/2024 29.3  27.0 - 31.0 pg Final    MCHC 05/21/2024 32.9  32.0 - 36.0 g/dL Final    RDW 05/21/2024 11.8  11.5 - 14.5 % Final    Platelets 05/21/2024 176  150 - 450 K/uL Final    MPV 05/21/2024 9.8  9.2 - 12.9 fL Final    Immature Granulocytes 05/21/2024 0.2  0.0 - 0.5 % Final    Gran # (ANC) 05/21/2024 2.1  1.8 - 7.7 K/uL Final    Immature Grans (Abs) 05/21/2024 0.01  0.00 - 0.04 K/uL Final    Lymph # 05/21/2024 1.7  1.0 - 4.8 K/uL Final    Mono # 05/21/2024 0.5  0.3 - 1.0 K/uL Final    Eos # 05/21/2024 0.3  0.0 - 0.5 K/uL Final    Baso # 05/21/2024 0.04  0.00 - 0.20 K/uL Final    nRBC 05/21/2024 0  0 /100 WBC Final    Gran % 05/21/2024 45.3  38.0 - 73.0 % Final    Lymph % 05/21/2024 37.1  18.0 - 48.0 % Final    Mono %  2024 10.6  4.0 - 15.0 % Final    Eosinophil % 2024 5.9  0.0 - 8.0 % Final    Basophil % 2024 0.9  0.0 - 1.9 % Final    Differential Method 2024 Automated   Final    POCT Glucose 2024 79  70 - 110 mg/dL Final    POC Preg Test, Ur 2024 Negative  Negative Final     Acceptable 2024 Yes   Final    Final Pathologic Diagnosis 2024    Final                    Value:Uterus, bilateral fallopian tubes, hysterectomy and bilateral salpingectomy:      - Uterus          - Cervix: unremarkable          - Endometrium: secretory           - Myometrium: adenomyosis and multiple leiomyomas           - Serosa: unremarkable      - Fallopian tubes: benign       Gross 2024    Final                    Value:Pathology ID: [id number]     :  1975    SURGERY MRN:  [id number]/PATHOLOGY MRN:  [id number]     PART 1:  SPECIMEN DESIGNATION: Received fresh and subsequently placed in formalin designated as &quot;cervix, uterus, bilateral fallopian tubes&quot;.    COMPONENTS: The specimen consists of a 271 g total hysterectomy (13.5 cm superior to inferior x 7.8 cm right to left x 6.2 cm anterior to posterior) with attached partial right fallopian tube (received in 2 pieces reapproximated to measure approximately   4.0 cm in length x 0.6 cm in diameter) and left fallopian tube (6.2 cm in length x 0.6 cm in average diameter).    UTERINE SEROSA: The serosa is pink-tan, smooth and glistening.    CERVIX: The attached cervix (3.4 cm in diameter) has pink-tan to tan-yellow, glistening ectocervical mucosa. The cervical os is round and measures 0.5 cm in diameter. The endocervical canal (3.6 cm in length x 0.8 cm in average diameter) has pink-tan   slightly nodular mucosa.    ENDOMETRIUM: The endometrial                           cavity (approximately 3.5 cm cornu to cornu x 6.0 cm in length) has red-pink to red-brown slightly nodular endometrium, with an average thickness measuring 0.2  cm. No endometrial masses are identified.    MYOMETRIUM: The myometrium is pink-tan, mildly trabeculated with an average thickness measuring 2.6 cm.  Multiple subserosal and intramural myometrial masses are present ranging from 0.5-3.2 cm in greatest dimension.  The masses are serially sectioned to   reveal a tan-white, whorled cut surface, with the largest mass having sparse central areas of hemorrhage.  No areas of calcification are present.    FALLOPIAN TUBES: The bilateral fallopian tubes have a purple-gray slightly fibrotic serosal surface, and an and are received centrally disrupted consistent with a previous tubal ligation.  The fimbriated ends are unremarkable. Cross-sections reveal an   unremarkable patent lumen (0.1 cm).    CASSETTE SUMMARY: Representative sections are submitted as follows:  CVH--1-A:  Anterior                           cervix  SXY--1-B:  Posterior cervix  VXD--1-C:  Anterior endomyometrial cross-section with serosa  CZZ--1-D:  Posterior endomyometrial cross-section with serosa  YAQ--1-E:  Myometrial masses, including mass with central area of hemorrhage  YCV--1-F:  Right fallopian tube  ARK--1-G:  Left fallopian tube    Date Received/Opened:  05/21/2024  Date Grossed:  05/22/2024     -Pham Hassan MS, PA([facility])      Disclaimer 05/21/2024 Unless the case is a 'gross only' or additional testing only, the final diagnosis for each specimen is based on a microscopic examination of appropriate tissue sections.   Final   Admission on 04/02/2024, Discharged on 04/02/2024   Component Date Value Ref Range Status    WBC 04/02/2024 6.85  3.90 - 12.70 K/uL Final    RBC 04/02/2024 4.27  4.00 - 5.40 M/uL Final    Hemoglobin 04/02/2024 12.5  12.0 - 16.0 g/dL Final    Hematocrit 04/02/2024 37.2  37.0 - 48.5 % Final    MCV 04/02/2024 87  82 - 98 fL Final    MCH 04/02/2024 29.3  27.0 - 31.0 pg Final    MCHC 04/02/2024 33.6  32.0 - 36.0 g/dL Final    RDW  2024 13.6  11.5 - 14.5 % Final    Platelets 2024 244  150 - 450 K/uL Final    MPV 2024 9.6  9.2 - 12.9 fL Final    Immature Granulocytes 2024 0.4  0.0 - 0.5 % Final    Gran # (ANC) 2024 4.2  1.8 - 7.7 K/uL Final    Immature Grans (Abs) 2024 0.03  0.00 - 0.04 K/uL Final    Lymph # 2024 1.9  1.0 - 4.8 K/uL Final    Mono # 2024 0.6  0.3 - 1.0 K/uL Final    Eos # 2024 0.2  0.0 - 0.5 K/uL Final    Baso # 2024 0.05  0.00 - 0.20 K/uL Final    nRBC 2024 0  0 /100 WBC Final    Gran % 2024 60.8  38.0 - 73.0 % Final    Lymph % 2024 27.0  18.0 - 48.0 % Final    Mono % 2024 8.5  4.0 - 15.0 % Final    Eosinophil % 2024 2.6  0.0 - 8.0 % Final    Basophil % 2024 0.7  0.0 - 1.9 % Final    Differential Method 2024 Automated   Final    Group & Rh 2024 O POS   Final    Indirect Gisselle 2024 NEG   Final    Specimen Outdate 2024 23:59   Final    POC Preg Test, Ur 2024 Negative  Negative Final     Acceptable 2024 Yes   Final    Final Pathologic Diagnosis 2024    Final                    Value:1. UTERUS, HYSTEROSCOPIC MYOMECTOMY:  -  Submucosal leiomyoma.  -  Focal benign endometrium with exogenous progestin effect.  -  No evidence of atypia or malignancy.      2. ENDOMETRIAL CURETTAGE:  -  Polypoid fragments of endometrium with exogenous progestin effect and focal tubal metaplasia; intermixed with blood clot.  -  Small fragments of benign endocervix with mild chronic inflammation and strips of benign endocervical epithelium intermixed with mucus.  -  No evidence of atypia, dysplasia, endometrial hyperplasia, or malignancy.    COMMENT:  This patient is currently taking birth control pills which correlates with the exogenous progestin effect seen in the endometrium.      Gross 2024    Final                    Value:Pathology ID: UBS-     :  1975    SURGERY  MRN:  [id number]/PATHOLOGY MRN:  [id number]     PART 1:  Received in formalin labeled as &quot;uterine fibroid&quot; is a mesh surgical vacuum sleeve containing an aggregate of tan-white rubbery tissue (8.5 x 8.0 x 1.3 cm).  No areas of yellow discoloration are grossly identified.  Representative sections are   submitted in ROP--1-A and HBC--1-B.    SURGERY MRN:  [id number]/PATHOLOGY MRN:  [id number]     PART 2:  Received in formalin labeled as &quot;endometrial curettings&quot; is an aggregate of pink-tan to pink-gray soft tissue with intermixed blood clot material (3.5 x 3.5 x 0.3 cm), entirely submitted in ENF--2-A and ENW--2-B.      -Pham Hassan, MS, PA([facility])         Disclaimer 04/02/2024 Unless the case is a 'gross only' or additional testing only, the final diagnosis for each specimen is based on a microscopic examination of appropriate tissue sections.   Final   Lab Visit on 03/18/2024   Component Date Value Ref Range Status    WBC 03/18/2024 6.67  3.90 - 12.70 K/uL Final    RBC 03/18/2024 4.34  4.00 - 5.40 M/uL Final    Hemoglobin 03/18/2024 12.6  12.0 - 16.0 g/dL Final    Hematocrit 03/18/2024 38.8  37.0 - 48.5 % Final    MCV 03/18/2024 89  82 - 98 fL Final    MCH 03/18/2024 29.0  27.0 - 31.0 pg Final    MCHC 03/18/2024 32.5  32.0 - 36.0 g/dL Final    RDW 03/18/2024 16.3 (H)  11.5 - 14.5 % Final    Platelets 03/18/2024 256  150 - 450 K/uL Final    MPV 03/18/2024 10.7  9.2 - 12.9 fL Final    Immature Granulocytes 03/18/2024 0.3  0.0 - 0.5 % Final    Gran # (ANC) 03/18/2024 4.6  1.8 - 7.7 K/uL Final    Immature Grans (Abs) 03/18/2024 0.02  0.00 - 0.04 K/uL Final    Lymph # 03/18/2024 1.4  1.0 - 4.8 K/uL Final    Mono # 03/18/2024 0.5  0.3 - 1.0 K/uL Final    Eos # 03/18/2024 0.1  0.0 - 0.5 K/uL Final    Baso # 03/18/2024 0.04  0.00 - 0.20 K/uL Final    nRBC 03/18/2024 0  0 /100 WBC Final    Gran % 03/18/2024 68.5  38.0 - 73.0 % Final    Lymph % 03/18/2024 21.6  18.0 - 48.0 % Final    Mono %  2024 6.9  4.0 - 15.0 % Final    Eosinophil % 2024 2.1  0.0 - 8.0 % Final    Basophil % 2024 0.6  0.0 - 1.9 % Final    Differential Method 2024 Automated   Final    Sodium 2024 138  136 - 145 mmol/L Final    Potassium 2024 5.1  3.5 - 5.1 mmol/L Final    Chloride 2024 107  95 - 110 mmol/L Final    CO2 2024 23  23 - 29 mmol/L Final    Glucose 2024 90  70 - 110 mg/dL Final    BUN 2024 11  6 - 20 mg/dL Final    Creatinine 2024 0.9  0.5 - 1.4 mg/dL Final    Calcium 2024 9.9  8.7 - 10.5 mg/dL Final    Total Protein 2024 7.1  6.0 - 8.4 g/dL Final    Albumin 2024 4.1  3.5 - 5.2 g/dL Final    Total Bilirubin 2024 0.4  0.1 - 1.0 mg/dL Final    Alkaline Phosphatase 2024 38 (L)  55 - 135 U/L Final    AST 2024 18  10 - 40 U/L Final    ALT 2024 16  10 - 44 U/L Final    eGFR 2024 >60.0  >60 mL/min/1.73 m^2 Final    Anion Gap 2024 8  8 - 16 mmol/L Final    Ferritin 2024 55  20.0 - 300.0 ng/mL Final    Iron 2024 146  30 - 160 ug/dL Final    Transferrin 2024 286  200 - 375 mg/dL Final    TIBC 2024 423  250 - 450 ug/dL Final    Saturated Iron 2024 35  20 - 50 % Final       Past Medical History:   Diagnosis Date    Abnormal Pap smear of cervix      Past Surgical History:   Procedure Laterality Date     SECTION      x3    CYSTOSCOPY  2024    Procedure: CYSTOSCOPY;  Surgeon: Tg Smith MD;  Location: [facility];  Service: OB/GYN;;    ELBOW SURGERY Right     HEMORRHOID SURGERY      HYSTEROSCOPIC RESECTION OF MYOMA N/A 2024    Procedure: MYOMECTOMY, HYSTEROSCOPIC;  Surgeon: Tg Smith MD;  Location: [facility] OR;  Service: OB/GYN;  Laterality: N/A;    INTRAUTERINE DEVICE INSERTION N/A 2024    Procedure: INSERTION, INTRAUTERINE DEVICE;  Surgeon: Tg Smith MD;  Location: [facility] OR;  Service: OB/GYN;  Laterality: N/A;    LAPAROSCOPIC SALPINGECTOMY  Bilateral 2024    Procedure: SALPINGECTOMY, LAPAROSCOPIC;  Surgeon: Tg Smith MD;  Location: [facility] OR;  Service: OB/GYN;  Laterality: Bilateral;    LAPAROSCOPIC TOTAL HYSTERECTOMY N/A 2024    Procedure: HYSTERECTOMY, TOTAL, LAPAROSCOPIC;  Surgeon: Tg Smith MD;  Location: [facility] OR;  Service: OB/GYN;  Laterality: N/A;    TONSILLECTOMY  76    TUBAL LIGATION       Social History     Tobacco Use    Smoking status: Never    Smokeless tobacco: Never   Substance Use Topics    Alcohol use: Yes     Alcohol/week: 4.0 standard drinks of alcohol     Types: 1 Glasses of wine, 3 Shots of liquor per week     Comment: socially    Drug use: Never     Family History   Problem Relation Name Age of Onset    Breast cancer Paternal Grandmother      Breast cancer Paternal Aunt      Diabetes Maternal Grandmother Yanira     Colon cancer Neg Hx      Ovarian cancer Neg Hx       OB History    Para Term  AB Living   3 3 3     3   SAB IAB Ectopic Multiple Live Births           3      # Outcome Date GA Lbr Gerber/2nd Weight Sex Type Anes PTL Lv   3 Term 06 38w0d  3.799 kg (8 lb 6 oz) F CS-Unspec EPI  ROSIE   2 Term 00 38w0d  3.345 kg (7 lb 6 oz) F CS-Unspec EPI  ROSIE   1 Term 98 39w0d  2.92 kg (6 lb 7 oz) M CS-Unspec EPI  ROSIE      Obstetric Comments   Menarche age 14       Current Outpatient Medications:     ALPRAZolam (XANAX) 0.25 MG tablet, Take 1 tablet (0.25 mg total) by mouth daily as needed for Anxiety (anxiety, panic attack)., Disp: 30 tablet, Rfl: 0    buPROPion (WELLBUTRIN XL) 300 MG 24 hr tablet, Take 1 tablet (300 mg total) by mouth once daily., Disp: 30 tablet, Rfl: 11    busPIRone (BUSPAR) 10 MG tablet, Take 2 tablets (20 mg total) by mouth 2 (two) times daily., Disp: 120 tablet, Rfl: 11    ondansetron (ZOFRAN) 4 MG tablet, Take 1 tablet (4 mg total) by mouth every 6 (six) hours as needed for Nausea., Disp: 20 tablet, Rfl: 0    traZODone (DESYREL) 50 MG tablet, Take 1 tablet  (50 mg total) by mouth every evening., Disp: 30 tablet, Rfl: 11  No current facility-administered medications for this visit.    Facility-Administered Medications Ordered in Other Visits:     dextrose 5 % in lactated ringers infusion, , Intravenous, Continuous, Tg Smith MD    Vitals:    06/03/24 0948   BP: 106/73   Weight: 60.1 kg (132 lb 6.4 oz)     Body mass index is 21.37 kg/m².    Assessment:    Menopausal symptoms  -     FOLLICLE STIMULATING HORMONE; Future; Expected date: 06/03/2024  -     ESTRADIOL; Future; Expected date: 06/03/2024  -     Testosterone, free; Future; Expected date: 06/03/2024  -     TESTOSTERONE; Future; Expected date: 06/03/2024  -     TSH; Future; Expected date: 06/03/2024  -     LUTEINIZING HORMONE; Future; Expected date: 06/03/2024    Screening mammogram for breast cancer  -     Mammo Digital Screening Bilat w/ Marcellus; Future; Expected date: 06/03/2024    History of total hysterectomy        Plan:   Risks and benefits of hormone replacement therapy were discussed.  Hormone replacement therapy options, including bioidentical versus non-bioidentical hormones, as well as alternatives discussed.    HRT lab work. Discussed some minor ovary dysregulation expected post hysterectomy, plan to reassess at next visit.  Screening mammogram ordered.    Follow up in 4 weeks  Will recheck labs once on typical optimal dose or if having side effects.  Instructed patient to call if she experiences any side effects or has any questions.       Porsche Thapa, [id number]

## 2024-06-06 LAB — TESTOST FREE SERPL-MCNC: <0.4 PG/ML

## 2024-06-06 NOTE — PROGRESS NOTES
"  Subjective:       Mary Kate Lima is a 48 y.o.  who presents to the clinic 2 weeks status post total laparoscopic hysterectomy for abnormal uterine bleeding. Eating a regular diet without difficulty. Bowel movements are normal. Minimal pain.  Bruising has improved greatly, almost resolved.  No bleeding.    The patient's allergies, and past medical and surgical histories were reviewed.    Review of Systems  Pertinent items are noted in HPI.      Objective:      BP 98/62 (BP Location: Left arm, Patient Position: Sitting, BP Method: Small (Manual))   Ht 5' 6" (1.676 m)   Wt 59.6 kg (131 lb 6.3 oz)   LMP 2024 (Exact Date)   BMI 21.21 kg/m²     APPEARANCE: Well nourished, well developed, in no acute distress.  PSYCH: Appropriate mood and affect.  NECK:  Supple, no thyromegaly.  BREASTS:  No masses, no nipple discharge.  CARDIOVASCULAR:  Regular rate and rhythm.  LUNGS:  Clear to auscultation bilaterally.  ABDOMEN:  Soft, nontender.  Incision c/d/I, healing well.  Bruisingresolved.  No fluctuance, no induration, no erythema.  Lightening and turning yellow.    GENITOURINARY:  deferred  EXTREMITIES: No edema.         Assessment:      Doing well postoperatively.  Operative findings again reviewed. Pathology report discussed.      Plan:      1. Continue any current medications.  Reviewed postop care and instructions.    2. Wound care discussed.  3. Activity restrictions: no lifting more than 10 pounds  4. Anticipated return to work: 4 weeks.  5. Follow up:  4 weeks      "

## 2024-06-10 ENCOUNTER — HOSPITAL ENCOUNTER (OUTPATIENT)
Dept: RADIOLOGY | Facility: HOSPITAL | Age: 49
Discharge: HOME | End: 2024-06-10
Attending: NURSE PRACTITIONER
Payer: COMMERCIAL

## 2024-06-10 VITALS — HEIGHT: 66 IN | BODY MASS INDEX: 21.05 KG/M2 | WEIGHT: 131 LBS

## 2024-06-10 DIAGNOSIS — Z12.31 SCREENING MAMMOGRAM FOR BREAST CANCER: ICD-10-CM

## 2024-06-10 PROCEDURE — 77067 SCR MAMMO BI INCL CAD: CPT | Mod: TC

## 2024-06-10 PROCEDURE — 77063 BREAST TOMOSYNTHESIS BI: CPT | Mod: TC

## 2024-06-10 PROCEDURE — 77063 BREAST TOMOSYNTHESIS BI: CPT | Mod: 26,,, | Performed by: RADIOLOGY

## 2024-06-10 PROCEDURE — 77067 SCR MAMMO BI INCL CAD: CPT | Mod: 26,,, | Performed by: RADIOLOGY

## 2024-07-01 ENCOUNTER — OFFICE VISIT (OUTPATIENT)
Dept: OBSTETRICS AND GYNECOLOGY | Facility: CLINIC | Age: 49
End: 2024-07-01
Attending: STUDENT IN AN ORGANIZED HEALTH CARE EDUCATION/TRAINING PROGRAM
Payer: COMMERCIAL

## 2024-07-01 VITALS
WEIGHT: 133.38 LBS | DIASTOLIC BLOOD PRESSURE: 76 MMHG | HEIGHT: 66 IN | BODY MASS INDEX: 21.44 KG/M2 | SYSTOLIC BLOOD PRESSURE: 118 MMHG

## 2024-07-01 DIAGNOSIS — Z98.890 POST-OPERATIVE STATE: Primary | ICD-10-CM

## 2024-07-01 PROCEDURE — 99024 POSTOP FOLLOW-UP VISIT: CPT | Mod: S$GLB,,, | Performed by: STUDENT IN AN ORGANIZED HEALTH CARE EDUCATION/TRAINING PROGRAM

## 2024-07-01 PROCEDURE — 1159F MED LIST DOCD IN RCRD: CPT | Mod: CPTII,S$GLB,, | Performed by: STUDENT IN AN ORGANIZED HEALTH CARE EDUCATION/TRAINING PROGRAM

## 2024-07-01 PROCEDURE — 99999 PR PBB SHADOW E&M-EST. PATIENT-LVL III: CPT | Mod: PBBFAC,,, | Performed by: STUDENT IN AN ORGANIZED HEALTH CARE EDUCATION/TRAINING PROGRAM

## 2024-07-01 PROCEDURE — 3078F DIAST BP <80 MM HG: CPT | Mod: CPTII,S$GLB,, | Performed by: STUDENT IN AN ORGANIZED HEALTH CARE EDUCATION/TRAINING PROGRAM

## 2024-07-01 PROCEDURE — 3074F SYST BP LT 130 MM HG: CPT | Mod: CPTII,S$GLB,, | Performed by: STUDENT IN AN ORGANIZED HEALTH CARE EDUCATION/TRAINING PROGRAM

## 2024-07-01 NOTE — PROGRESS NOTES
"Subjective:       Mary Kate Lima is a 48 y.o.  who presents to the clinic 6 weeks status post total laparoscopic hysterectomy for abnormal uterine bleeding. Eating a regular diet without difficulty. Bowel movements are normal. The patient is not having any pain.    The patient's allergies, and past medical and surgical histories were reviewed.    Review of Systems  Pertinent items are noted in HPI.      Objective:      /76 (BP Location: Left arm, Patient Position: Sitting, BP Method: Small (Manual))   Ht 5' 6" (1.676 m)   Wt 60.5 kg (133 lb 6.1 oz)   LMP 2024 (Exact Date)   BMI 21.53 kg/m²     APPEARANCE: Well nourished, well developed, in no acute distress.  PSYCH: Appropriate mood and affect.  NECK:  Supple, no thyromegaly.  BREASTS:  No masses, no nipple discharge.  CARDIOVASCULAR:  Regular rate and rhythm.  LUNGS:  Clear to auscultation bilaterally.  ABDOMEN:  Soft, nontender.  Incision c/d/i.    GENITOURINARY:  External genitalia normal in appearance, normal perineal body, normal urethral meatus.  Vagina with scant discharge, no blood.  Vaginal cuff healing well.  Adnexa without masses or TTP.    EXTREMITIES: No edema.    Pathology:    1 mo ago    Final Pathologic Diagnosis Uterus, bilateral fallopian tubes, hysterectomy and bilateral salpingectomy:      - Uterus          - Cervix: unremarkable          - Endometrium: secretory          - Myometrium: adenomyosis and multiple leiomyomas          - Serosa: unremarkable      - Fallopian tubes: benign          Assessment:      Doing well postoperatively.  Operative findings again reviewed. Pathology report discussed.      Plan:      1. Continue any current medications.  2. Wound care discussed.  3. Activity restrictions:  pelvic rest and no lifting > 10 lbs x 2 more weeks  4. Anticipated return to work: now.  5. Follow up:annual or sooner if needed.        "

## 2024-07-09 ENCOUNTER — OFFICE VISIT (OUTPATIENT)
Dept: OBSTETRICS AND GYNECOLOGY | Facility: CLINIC | Age: 49
End: 2024-07-09
Payer: COMMERCIAL

## 2024-07-09 DIAGNOSIS — N95.1 PERIMENOPAUSAL SYMPTOMS: Primary | ICD-10-CM

## 2024-07-09 DIAGNOSIS — R68.82 LOW LIBIDO: ICD-10-CM

## 2024-07-09 RX ORDER — PROGESTERONE 100 MG/1
100 CAPSULE ORAL NIGHTLY
Qty: 30 CAPSULE | Refills: 11 | Status: SHIPPED | OUTPATIENT
Start: 2024-07-09 | End: 2025-07-09

## 2024-07-09 RX ORDER — TESTOSTERONE CYPIONATE 1000 MG/10ML
26 INJECTION, SOLUTION INTRAMUSCULAR
Status: SHIPPED | OUTPATIENT
Start: 2024-07-09 | End: 2024-10-07

## 2024-07-09 RX ORDER — ESTRADIOL 0.03 MG/D
1 FILM, EXTENDED RELEASE TRANSDERMAL
Qty: 8 PATCH | Refills: 11 | Status: SHIPPED | OUTPATIENT
Start: 2024-07-11 | End: 2025-07-11

## 2024-07-09 NOTE — PROGRESS NOTES
Subjective:      Mary Kate Lima is a 48 y.o. female who is here for follow-up of hormone replacement therapy via virtual visit. She is SP TL-BS, 6 weeks post op. Persistent hot flashes, night sweats, insomnia, mood irritability, and absence of libido. Desires treatment of continued symptoms.     Lab Visit on 06/03/2024   Component Date Value Ref Range Status    Follicle Stimulating Hormone 06/03/2024 21.44  See Text mIU/mL Final    Estradiol 06/03/2024 <10 (A)  See Text pg/mL Final    Testosterone, Free 06/03/2024 <0.4  pg/mL Final    Testosterone, Total 06/03/2024 16  5 - 73 ng/dL Final    TSH 06/03/2024 0.794  0.400 - 4.000 uIU/mL Final    LH 06/03/2024 10.3  See Text mIU/mL Final   Admission on 05/21/2024, Discharged on 05/21/2024   Component Date Value Ref Range Status    Group & Rh 05/21/2024 O POS   Final    Indirect Gisselle 05/21/2024 NEG   Final    Specimen Outdate 05/21/2024 05/24/2024 23:59   Final    WBC 05/21/2024 4.61  3.90 - 12.70 K/uL Final    RBC 05/21/2024 4.09  4.00 - 5.40 M/uL Final    Hemoglobin 05/21/2024 12.0  12.0 - 16.0 g/dL Final    Hematocrit 05/21/2024 36.5 (L)  37.0 - 48.5 % Final    MCV 05/21/2024 89  82 - 98 fL Final    MCH 05/21/2024 29.3  27.0 - 31.0 pg Final    MCHC 05/21/2024 32.9  32.0 - 36.0 g/dL Final    RDW 05/21/2024 11.8  11.5 - 14.5 % Final    Platelets 05/21/2024 176  150 - 450 K/uL Final    MPV 05/21/2024 9.8  9.2 - 12.9 fL Final    Immature Granulocytes 05/21/2024 0.2  0.0 - 0.5 % Final    Gran # (ANC) 05/21/2024 2.1  1.8 - 7.7 K/uL Final    Immature Grans (Abs) 05/21/2024 0.01  0.00 - 0.04 K/uL Final    Lymph # 05/21/2024 1.7  1.0 - 4.8 K/uL Final    Mono # 05/21/2024 0.5  0.3 - 1.0 K/uL Final    Eos # 05/21/2024 0.3  0.0 - 0.5 K/uL Final    Baso # 05/21/2024 0.04  0.00 - 0.20 K/uL Final    nRBC 05/21/2024 0  0 /100 WBC Final    Gran % 05/21/2024 45.3  38.0 - 73.0 % Final    Lymph % 05/21/2024 37.1  18.0 - 48.0 % Final    Mono % 05/21/2024 10.6  4.0 - 15.0 % Final     Eosinophil % 2024 5.9  0.0 - 8.0 % Final    Basophil % 2024 0.9  0.0 - 1.9 % Final    Differential Method 2024 Automated   Final    POCT Glucose 2024 79  70 - 110 mg/dL Final    POC Preg Test, Ur 2024 Negative  Negative Final     Acceptable 2024 Yes   Final    Final Pathologic Diagnosis 2024    Final                    Value:Uterus, bilateral fallopian tubes, hysterectomy and bilateral salpingectomy:      - Uterus          - Cervix: unremarkable          - Endometrium: secretory           - Myometrium: adenomyosis and multiple leiomyomas           - Serosa: unremarkable      - Fallopian tubes: benign       Gross 2024    Final                    Value:Pathology ID: [id number]     :  1975    SURGERY MRN:  [id number]/PATHOLOGY MRN:  [id number]     PART 1:  SPECIMEN DESIGNATION: Received fresh and subsequently placed in formalin designated as &quot;cervix, uterus, bilateral fallopian tubes&quot;.    COMPONENTS: The specimen consists of a 271 g total hysterectomy (13.5 cm superior to inferior x 7.8 cm right to left x 6.2 cm anterior to posterior) with attached partial right fallopian tube (received in 2 pieces reapproximated to measure approximately   4.0 cm in length x 0.6 cm in diameter) and left fallopian tube (6.2 cm in length x 0.6 cm in average diameter).    UTERINE SEROSA: The serosa is pink-tan, smooth and glistening.    CERVIX: The attached cervix (3.4 cm in diameter) has pink-tan to tan-yellow, glistening ectocervical mucosa. The cervical os is round and measures 0.5 cm in diameter. The endocervical canal (3.6 cm in length x 0.8 cm in average diameter) has pink-tan   slightly nodular mucosa.    ENDOMETRIUM: The endometrial                           cavity (approximately 3.5 cm cornu to cornu x 6.0 cm in length) has red-pink to red-brown slightly nodular endometrium, with an average thickness measuring 0.2 cm. No endometrial masses are  identified.    MYOMETRIUM: The myometrium is pink-tan, mildly trabeculated with an average thickness measuring 2.6 cm.  Multiple subserosal and intramural myometrial masses are present ranging from 0.5-3.2 cm in greatest dimension.  The masses are serially sectioned to   reveal a tan-white, whorled cut surface, with the largest mass having sparse central areas of hemorrhage.  No areas of calcification are present.    FALLOPIAN TUBES: The bilateral fallopian tubes have a purple-gray slightly fibrotic serosal surface, and an and are received centrally disrupted consistent with a previous tubal ligation.  The fimbriated ends are unremarkable. Cross-sections reveal an   unremarkable patent lumen (0.1 cm).    CASSETTE SUMMARY: Representative sections are submitted as follows:  HKK--1-A:  Anterior                           cervix  LEE--1-B:  Posterior cervix  JGR--1-C:  Anterior endomyometrial cross-section with serosa  MJY--1-D:  Posterior endomyometrial cross-section with serosa  TWL--1-E:  Myometrial masses, including mass with central area of hemorrhage  LPC--1-F:  Right fallopian tube  HFJ--1-G:  Left fallopian tube    Date Received/Opened:  2024  Date Grossed:  2024     -Pham Hassan MS, PA([facility])      Disclaimer 2024 Unless the case is a 'gross only' or additional testing only, the final diagnosis for each specimen is based on a microscopic examination of appropriate tissue sections.   Final       Past Medical History:   Diagnosis Date    Abnormal Pap smear of cervix      Past Surgical History:   Procedure Laterality Date     SECTION      x3    CYSTOSCOPY  2024    Procedure: CYSTOSCOPY;  Surgeon: Tg Smith MD;  Location: [facility];  Service: OB/GYN;;    ELBOW SURGERY Right     HEMORRHOID SURGERY      HYSTEROSCOPIC RESECTION OF MYOMA N/A 2024    Procedure: MYOMECTOMY, HYSTEROSCOPIC;  Surgeon: Tg Smith MD;   Location: [facility] OR;  Service: OB/GYN;  Laterality: N/A;    INTRAUTERINE DEVICE INSERTION N/A 2024    Procedure: INSERTION, INTRAUTERINE DEVICE;  Surgeon: Tg Smith MD;  Location: [facility] OR;  Service: OB/GYN;  Laterality: N/A;    LAPAROSCOPIC SALPINGECTOMY Bilateral 2024    Procedure: SALPINGECTOMY, LAPAROSCOPIC;  Surgeon: Tg Smith MD;  Location: [facility] OR;  Service: OB/GYN;  Laterality: Bilateral;    LAPAROSCOPIC TOTAL HYSTERECTOMY N/A 2024    Procedure: HYSTERECTOMY, TOTAL, LAPAROSCOPIC;  Surgeon: Tg Smith MD;  Location: [facility] OR;  Service: OB/GYN;  Laterality: N/A;    TONSILLECTOMY  78    TUBAL LIGATION       Social History     Tobacco Use    Smoking status: Never    Smokeless tobacco: Never   Substance Use Topics    Alcohol use: Yes     Alcohol/week: 4.0 standard drinks of alcohol     Types: 1 Glasses of wine, 3 Shots of liquor per week     Comment: socially    Drug use: Never     Family History   Problem Relation Name Age of Onset    Breast cancer Paternal Grandmother      Breast cancer Paternal Aunt      Diabetes Maternal Grandmother Yanira     Colon cancer Neg Hx      Ovarian cancer Neg Hx       OB History    Para Term  AB Living   3 3 3     3   SAB IAB Ectopic Multiple Live Births           3      # Outcome Date GA Lbr Gerber/2nd Weight Sex Type Anes PTL Lv   3 Term 06 38w0d  3.799 kg (8 lb 6 oz) F CS-Unspec EPI  ROSIE   2 Term 00 38w0d  3.345 kg (7 lb 6 oz) F CS-Unspec EPI  ROSIE   1 Term 98 39w0d  2.92 kg (6 lb 7 oz) M CS-Unspec EPI  ROSIE      Obstetric Comments   Menarche age 14       Current Outpatient Medications:     ALPRAZolam (XANAX) 0.25 MG tablet, Take 1 tablet (0.25 mg total) by mouth daily as needed for Anxiety (anxiety, panic attack)., Disp: 30 tablet, Rfl: 0    buPROPion (WELLBUTRIN XL) 300 MG 24 hr tablet, Take 1 tablet (300 mg total) by mouth once daily., Disp: 30 tablet, Rfl: 11    busPIRone (BUSPAR) 10 MG tablet, Take 2  tablets (20 mg total) by mouth 2 (two) times daily., Disp: 120 tablet, Rfl: 11    [START ON 7/11/2024] estradioL (VIVELLE-DOT) 0.025 mg/24 hr, Place 1 patch onto the skin twice a week., Disp: 8 patch, Rfl: 11    ondansetron (ZOFRAN) 4 MG tablet, Take 1 tablet (4 mg total) by mouth every 6 (six) hours as needed for Nausea., Disp: 20 tablet, Rfl: 0    progesterone (PROMETRIUM) 100 MG capsule, Take 1 capsule (100 mg total) by mouth nightly., Disp: 30 capsule, Rfl: 11    traZODone (DESYREL) 50 MG tablet, Take 1 tablet (50 mg total) by mouth every evening., Disp: 30 tablet, Rfl: 11    Current Facility-Administered Medications:     testosterone cypionate injection 26 mg, 26 mg, Intramuscular, Q30 Days,     Facility-Administered Medications Ordered in Other Visits:     dextrose 5 % in lactated ringers infusion, , Intravenous, Continuous, Tg Smith MD    There were no vitals filed for this visit.  There is no height or weight on file to calculate BMI.       Assessment:    Perimenopausal symptoms  -     progesterone (PROMETRIUM) 100 MG capsule; Take 1 capsule (100 mg total) by mouth nightly.  Dispense: 30 capsule; Refill: 11  -     estradioL (VIVELLE-DOT) 0.025 mg/24 hr; Place 1 patch onto the skin twice a week.  Dispense: 8 patch; Refill: 11  -     Prior authorization Order    Low libido  -     testosterone cypionate injection 26 mg  -     Prior authorization Order        Plan:   Risks and benefits of hormone replacement therapy were discussed.  Hormone replacement therapy options, including bioidentical versus non-bioidentical hormones, as well as alternatives discussed.    Start:  Vivelle dot  0.025 mg/d  twice weekly  Progesterone 100 mg orally QPM  Testosterone cypionate  26 mg Im monthly.  Patient is aware this is off-label use in women, and FDA black box warnings were reviewed.    We spent a significant amt of time discussing estrogen replacement therapy.  We discussed the risks and benefits including  breast cancer and embolic risk, osteoporosis and heart and colon health benefits.  Pt understands that there are many different ways to take estrogen and many different doses and that she does not have to take long term unless she chooses.  She understands that if she still has her uterus, she will need to take progesterone with this to decrease risk of endometrial cancer.We discussed side effects that might include but not limited to headaches, edema, nausea.      Follow up in 2 months.  Instructed patient to call if she experiences any side effects or has any questions.       SANTOS Paul    The patient location is: [place]  The chief complaint leading to consultation is: HRT FU    Visit type: audiovisual    Face to Face time with patient: 20 minutes  20 minutes of total time spent on the encounter, which includes face to face time and non-face to face time preparing to see the patient (eg, review of tests), Obtaining and/or reviewing separately obtained history, Documenting clinical information in the electronic or other health record, Independently interpreting results (not separately reported) and communicating results to the patient/family/caregiver, or Care coordination (not separately reported).       Each patient to whom he or she provides medical services by telemedicine is:  (1) informed of the relationship between the physician and patient and the respective role of any other health care provider with respect to management of the patient; and (2) notified that he or she may decline to receive medical services by telemedicine and may withdraw from such care at any time.

## 2024-07-11 ENCOUNTER — PATIENT MESSAGE (OUTPATIENT)
Dept: OBSTETRICS AND GYNECOLOGY | Facility: CLINIC | Age: 49
End: 2024-07-11
Payer: COMMERCIAL

## 2024-07-18 ENCOUNTER — TELEPHONE (OUTPATIENT)
Dept: OBSTETRICS AND GYNECOLOGY | Facility: CLINIC | Age: 49
End: 2024-07-18
Payer: COMMERCIAL

## 2024-07-18 NOTE — TELEPHONE ENCOUNTER
----- Message from Porsche Thapa NP sent at 7/9/2024  9:46 AM CDT -----  Please schedule for testosterone injection, needs 8 week virtual FU

## 2024-07-19 ENCOUNTER — TELEPHONE (OUTPATIENT)
Dept: OBSTETRICS AND GYNECOLOGY | Facility: CLINIC | Age: 49
End: 2024-07-19
Payer: COMMERCIAL

## 2024-07-24 ENCOUNTER — CLINICAL SUPPORT (OUTPATIENT)
Dept: OBSTETRICS AND GYNECOLOGY | Facility: CLINIC | Age: 49
End: 2024-07-24
Payer: COMMERCIAL

## 2024-07-24 DIAGNOSIS — N95.1 PERIMENOPAUSAL SYMPTOMS: Primary | ICD-10-CM

## 2024-07-24 PROCEDURE — 99999 PR PBB SHADOW E&M-EST. PATIENT-LVL I: CPT | Mod: PBBFAC,,,

## 2024-07-24 PROCEDURE — 96372 THER/PROPH/DIAG INJ SC/IM: CPT | Mod: S$GLB,,, | Performed by: NURSE PRACTITIONER

## 2024-07-24 RX ORDER — TESTOSTERONE CYPIONATE 200 MG/ML
26 INJECTION, SOLUTION INTRAMUSCULAR
Status: SHIPPED | OUTPATIENT
Start: 2024-07-24 | End: 2024-10-16

## 2024-07-24 RX ADMIN — TESTOSTERONE CYPIONATE 26 MG: 200 INJECTION, SOLUTION INTRAMUSCULAR at 08:07

## 2024-07-24 NOTE — PROGRESS NOTES
Here for hormone therapy injection, no complaints at this time, Injection given as ordered, tolerated well, no report of pain prior to or after injection. Return to clinic as scheduled.     Site - [facility]     Testosterone  26  mg # 1    Clinic Supplied Medication

## 2024-08-05 NOTE — PLAN OF CARE
Mary Kate Lima has met all discharge criteria from Phase II. Vital Signs are stable, ambulating  without difficulty. Discharge instructions given, patient verbalized understanding. Discharged from facility via wheelchair in stable condition.     
Patient prefers to have Gilberto () present for discharge.  
no

## 2024-08-07 ENCOUNTER — E-VISIT (OUTPATIENT)
Dept: PRIMARY CARE CLINIC | Facility: CLINIC | Age: 49
End: 2024-08-07
Payer: COMMERCIAL

## 2024-08-07 ENCOUNTER — PATIENT MESSAGE (OUTPATIENT)
Dept: OBSTETRICS AND GYNECOLOGY | Facility: CLINIC | Age: 49
End: 2024-08-07
Payer: COMMERCIAL

## 2024-08-07 DIAGNOSIS — N30.00 ACUTE CYSTITIS WITHOUT HEMATURIA: Primary | ICD-10-CM

## 2024-08-07 RX ORDER — SULFAMETHOXAZOLE AND TRIMETHOPRIM 800; 160 MG/1; MG/1
1 TABLET ORAL 2 TIMES DAILY
Qty: 6 TABLET | Refills: 0 | Status: SHIPPED | OUTPATIENT
Start: 2024-08-07

## 2024-08-21 ENCOUNTER — PATIENT MESSAGE (OUTPATIENT)
Dept: OBSTETRICS AND GYNECOLOGY | Facility: CLINIC | Age: 49
End: 2024-08-21
Payer: COMMERCIAL

## 2024-08-22 RX ORDER — ONDANSETRON 4 MG/1
4 TABLET, FILM COATED ORAL EVERY 6 HOURS PRN
Qty: 20 TABLET | Refills: 0 | Status: SHIPPED | OUTPATIENT
Start: 2024-08-22

## 2024-08-22 RX ORDER — ONDANSETRON 4 MG/1
4 TABLET, ORALLY DISINTEGRATING ORAL 2 TIMES DAILY
Status: CANCELLED | OUTPATIENT
Start: 2024-08-22

## 2024-08-26 ENCOUNTER — CLINICAL SUPPORT (OUTPATIENT)
Dept: OBSTETRICS AND GYNECOLOGY | Facility: CLINIC | Age: 49
End: 2024-08-26
Payer: COMMERCIAL

## 2024-08-26 DIAGNOSIS — N95.1 PERIMENOPAUSAL SYMPTOMS: Primary | ICD-10-CM

## 2024-08-26 PROCEDURE — 96372 THER/PROPH/DIAG INJ SC/IM: CPT | Mod: S$GLB,,, | Performed by: NURSE PRACTITIONER

## 2024-08-26 PROCEDURE — 99999 PR PBB SHADOW E&M-EST. PATIENT-LVL I: CPT | Mod: PBBFAC,,,

## 2024-08-26 RX ADMIN — TESTOSTERONE CYPIONATE 26 MG: 200 INJECTION, SOLUTION INTRAMUSCULAR at 03:08

## 2024-08-26 NOTE — PROGRESS NOTES
Here for hormone therapy injection, no complaints at this time, Injection given as ordered, tolerated well, no report of pain prior to or after injection. Return to clinic as scheduled.     Site - [facility]    Testosterone  26 mg # 2    Clinic Supplied Medication

## 2024-09-17 ENCOUNTER — OFFICE VISIT (OUTPATIENT)
Dept: OBSTETRICS AND GYNECOLOGY | Facility: CLINIC | Age: 49
End: 2024-09-17
Payer: COMMERCIAL

## 2024-09-17 DIAGNOSIS — N95.1 MENOPAUSAL SYMPTOMS: Primary | ICD-10-CM

## 2024-09-17 PROCEDURE — 99214 OFFICE O/P EST MOD 30 MIN: CPT | Mod: 95,,, | Performed by: NURSE PRACTITIONER

## 2024-09-17 PROCEDURE — 1159F MED LIST DOCD IN RCRD: CPT | Mod: CPTII,95,, | Performed by: NURSE PRACTITIONER

## 2024-09-17 PROCEDURE — 1160F RVW MEDS BY RX/DR IN RCRD: CPT | Mod: CPTII,95,, | Performed by: NURSE PRACTITIONER

## 2024-09-17 NOTE — PROGRESS NOTES
Subjective:      Mary Kate Lima is a 48 y.o. female who is here for follow-up of hormone replacement therapy via virtual visit. She is SP hysterectomy performed in 2024, ovary sparing. Activation of menopausal symptoms following the procedure. Recent lab work with absence of Estradiol, FSH in perimenopausal range. Initiated on Vivelle Dot 0.025 mg/24 hour PTSW and Prometrium 100 mg PO QHS, Testosterone Cypionate 26 mg IM monthly.    Resolution of hot flashes/night sweats, insomnia- however headaches and nausea reported. Unsure if headaches occur with new patch placement. No current change to libido.     No visits with results within 3 Month(s) from this visit.   Latest known visit with results is:   Lab Visit on 2024   Component Date Value Ref Range Status    Follicle Stimulating Hormone 2024 21.44  See Text mIU/mL Final    Estradiol 2024 <10 (A)  See Text pg/mL Final    Testosterone, Free 2024 <0.4  pg/mL Final    Testosterone, Total 2024 16  5 - 73 ng/dL Final    TSH 2024 0.794  0.400 - 4.000 uIU/mL Final    LH 2024 10.3  See Text mIU/mL Final       Past Medical History:   Diagnosis Date    Abnormal Pap smear of cervix      Past Surgical History:   Procedure Laterality Date     SECTION      x3    CYSTOSCOPY  2024    Procedure: CYSTOSCOPY;  Surgeon: Tg Smith MD;  Location: [facility];  Service: OB/GYN;;    ELBOW SURGERY Right     HEMORRHOID SURGERY      HYSTEROSCOPIC RESECTION OF MYOMA N/A 2024    Procedure: MYOMECTOMY, HYSTEROSCOPIC;  Surgeon: Tg Smith MD;  Location: [facility] OR;  Service: OB/GYN;  Laterality: N/A;    INTRAUTERINE DEVICE INSERTION N/A 2024    Procedure: INSERTION, INTRAUTERINE DEVICE;  Surgeon: Tg Smith MD;  Location: [facility];  Service: OB/GYN;  Laterality: N/A;    LAPAROSCOPIC SALPINGECTOMY Bilateral 2024    Procedure: SALPINGECTOMY, LAPAROSCOPIC;  Surgeon: Tg Smith MD;   Location: [facility] OR;  Service: OB/GYN;  Laterality: Bilateral;    LAPAROSCOPIC TOTAL HYSTERECTOMY N/A 2024    Procedure: HYSTERECTOMY, TOTAL, LAPAROSCOPIC;  Surgeon: Tg Smith MD;  Location: [facility] OR;  Service: OB/GYN;  Laterality: N/A;    TONSILLECTOMY  78    TUBAL LIGATION       Social History     Tobacco Use    Smoking status: Never    Smokeless tobacco: Never   Substance Use Topics    Alcohol use: Yes     Alcohol/week: 4.0 standard drinks of alcohol     Types: 1 Glasses of wine, 3 Shots of liquor per week     Comment: socially    Drug use: Never     Family History   Problem Relation Name Age of Onset    Breast cancer Paternal Grandmother      Breast cancer Paternal Aunt      Diabetes Maternal Grandmother Yanira     Colon cancer Neg Hx      Ovarian cancer Neg Hx       OB History    Para Term  AB Living   3 3 3     3   SAB IAB Ectopic Multiple Live Births           3      # Outcome Date GA Lbr Gerber/2nd Weight Sex Type Anes PTL Lv   3 Term 06 38w0d  3.799 kg (8 lb 6 oz) F CS-Unspec EPI  ROSIE   2 Term 00 38w0d  3.345 kg (7 lb 6 oz) F CS-Unspec EPI  ROSIE   1 Term 98 39w0d  2.92 kg (6 lb 7 oz) M CS-Unspec EPI  ROSIE      Obstetric Comments   Menarche age 14       Current Outpatient Medications:     ALPRAZolam (XANAX) 0.25 MG tablet, Take 1 tablet (0.25 mg total) by mouth daily as needed for Anxiety (anxiety, panic attack)., Disp: 30 tablet, Rfl: 0    buPROPion (WELLBUTRIN XL) 300 MG 24 hr tablet, Take 1 tablet (300 mg total) by mouth once daily., Disp: 30 tablet, Rfl: 11    busPIRone (BUSPAR) 10 MG tablet, Take 2 tablets (20 mg total) by mouth 2 (two) times daily., Disp: 120 tablet, Rfl: 11    estradioL (VIVELLE-DOT) 0.025 mg/24 hr, Place 1 patch onto the skin twice a week., Disp: 8 patch, Rfl: 11    ondansetron (ZOFRAN) 4 MG tablet, Take 1 tablet (4 mg total) by mouth every 6 (six) hours as needed for Nausea., Disp: 20 tablet, Rfl: 0    progesterone (PROMETRIUM) 100 MG  capsule, Take 1 capsule (100 mg total) by mouth nightly., Disp: 30 capsule, Rfl: 11    sulfamethoxazole-trimethoprim 800-160mg (BACTRIM DS) 800-160 mg Tab, Take 1 tablet by mouth 2 (two) times daily., Disp: 6 tablet, Rfl: 0    traZODone (DESYREL) 50 MG tablet, Take 1 tablet (50 mg total) by mouth every evening., Disp: 30 tablet, Rfl: 11    Current Facility-Administered Medications:     testosterone cypionate injection 26 mg, 26 mg, Intramuscular, Q30 Days,     testosterone cypionate injection 26 mg, 26 mg, Intramuscular, Q28 Days, Porsche Thapa NP, 26 mg at 08/26/24 1535    Facility-Administered Medications Ordered in Other Visits:     dextrose 5 % in lactated ringers infusion, , Intravenous, Continuous, Tg Smith MD    There were no vitals filed for this visit.  There is no height or weight on file to calculate BMI.       Assessment:    Menopausal symptoms        Plan:   Risks and benefits of hormone replacement therapy were discussed.  Hormone replacement therapy options, including bioidentical versus non-bioidentical hormones, as well as alternatives discussed.    Decrease:  Vivelle Dot decreased to 0.025 mg 1/2 patch twice weekly    Will continue 100 mg Prometrium nightly.  Can explore Testosterone dose changes once side effect improvement to ensure solely related to Estradiol.  Strict instructions to notify if worsening of side effects.    Follow up in 6 weeks.  Instructed patient to call if she experiences any side effects or has any questions.       SANTOS Paul    The patient location is: [place]  The chief complaint leading to consultation is: HRT FU    Visit type: audiovisual    Face to Face time with patient: 20 minutes  20 minutes of total time spent on the encounter, which includes face to face time and non-face to face time preparing to see the patient (eg, review of tests), Obtaining and/or reviewing separately obtained history, Documenting clinical information in the  electronic or other health record, Independently interpreting results (not separately reported) and communicating results to the patient/family/caregiver, or Care coordination (not separately reported).     Each patient to whom he or she provides medical services by telemedicine is:  (1) informed of the relationship between the physician and patient and the respective role of any other health care provider with respect to management of the patient; and (2) notified that he or she may decline to receive medical services by telemedicine and may withdraw from such care at any time.

## 2024-09-24 ENCOUNTER — CLINICAL SUPPORT (OUTPATIENT)
Dept: OBSTETRICS AND GYNECOLOGY | Facility: CLINIC | Age: 49
End: 2024-09-24
Payer: COMMERCIAL

## 2024-09-24 ENCOUNTER — TELEPHONE (OUTPATIENT)
Dept: OBSTETRICS AND GYNECOLOGY | Facility: CLINIC | Age: 49
End: 2024-09-24
Payer: COMMERCIAL

## 2024-09-24 DIAGNOSIS — N95.1 MENOPAUSAL SYMPTOMS: Primary | ICD-10-CM

## 2024-09-24 PROCEDURE — 99999 PR PBB SHADOW E&M-EST. PATIENT-LVL I: CPT | Mod: PBBFAC,,,

## 2024-09-24 PROCEDURE — 96372 THER/PROPH/DIAG INJ SC/IM: CPT | Mod: S$GLB,,, | Performed by: NURSE PRACTITIONER

## 2024-09-24 RX ADMIN — TESTOSTERONE CYPIONATE 26 MG: 200 INJECTION, SOLUTION INTRAMUSCULAR at 03:09

## 2024-09-24 NOTE — PROGRESS NOTES
Here for hormone therapy injection, no complaints at this time, Injection given as ordered, tolerated well, no report of pain prior to or after injection. Return to clinic as scheduled.     Site - RB    Testosterone  26 mg # 3    Clinic Supplied Medication

## 2024-09-24 NOTE — TELEPHONE ENCOUNTER
----- Message from Porsche Thapa NP sent at 9/17/2024  9:38 AM CDT -----  Needs 6 weeks FU virtual

## 2024-10-15 ENCOUNTER — LAB VISIT (OUTPATIENT)
Dept: LAB | Facility: HOSPITAL | Age: 49
End: 2024-10-15
Attending: NURSE PRACTITIONER
Payer: COMMERCIAL

## 2024-10-15 DIAGNOSIS — N95.1 MENOPAUSAL SYMPTOMS: ICD-10-CM

## 2024-10-15 LAB
ESTRADIOL SERPL-MCNC: 43 PG/ML
PROGEST SERPL-MCNC: 3.7 NG/ML
TESTOST SERPL-MCNC: 26 NG/DL (ref 5–73)

## 2024-10-15 PROCEDURE — 84144 ASSAY OF PROGESTERONE: CPT | Performed by: NURSE PRACTITIONER

## 2024-10-15 PROCEDURE — 84402 ASSAY OF FREE TESTOSTERONE: CPT | Performed by: NURSE PRACTITIONER

## 2024-10-15 PROCEDURE — 36415 COLL VENOUS BLD VENIPUNCTURE: CPT | Performed by: NURSE PRACTITIONER

## 2024-10-15 PROCEDURE — 82670 ASSAY OF TOTAL ESTRADIOL: CPT | Performed by: NURSE PRACTITIONER

## 2024-10-15 PROCEDURE — 84403 ASSAY OF TOTAL TESTOSTERONE: CPT | Performed by: NURSE PRACTITIONER

## 2024-10-17 LAB — TESTOST FREE SERPL-MCNC: 0.7 PG/ML

## 2024-10-18 RX ORDER — ONDANSETRON 4 MG/1
4 TABLET, FILM COATED ORAL EVERY 6 HOURS PRN
Qty: 20 TABLET | Refills: 0 | Status: SHIPPED | OUTPATIENT
Start: 2024-10-18

## 2024-10-18 RX ORDER — ONDANSETRON 4 MG/1
4 TABLET, ORALLY DISINTEGRATING ORAL 2 TIMES DAILY
OUTPATIENT
Start: 2024-10-18

## 2024-10-22 ENCOUNTER — CLINICAL SUPPORT (OUTPATIENT)
Dept: OBSTETRICS AND GYNECOLOGY | Facility: CLINIC | Age: 49
End: 2024-10-22
Payer: COMMERCIAL

## 2024-10-22 DIAGNOSIS — N95.1 MENOPAUSAL SYMPTOMS: Primary | ICD-10-CM

## 2024-10-22 PROCEDURE — 99999 PR PBB SHADOW E&M-EST. PATIENT-LVL I: CPT | Mod: PBBFAC,,,

## 2024-10-22 RX ORDER — TESTOSTERONE CYPIONATE 200 MG/ML
26 INJECTION, SOLUTION INTRAMUSCULAR
Status: SHIPPED | OUTPATIENT
Start: 2024-10-23 | End: 2025-01-15

## 2024-10-22 RX ADMIN — TESTOSTERONE CYPIONATE 26 MG: 200 INJECTION, SOLUTION INTRAMUSCULAR at 04:10

## 2024-10-22 NOTE — PROGRESS NOTES
Here for hormone therapy injection, no complaints at this time, Injection given as ordered, tolerated well, no report of pain prior to or after injection. Return to clinic as scheduled.     Site - [facility]    Testosterone  26 mg    Clinic Supplied Medication

## 2024-11-05 ENCOUNTER — OFFICE VISIT (OUTPATIENT)
Dept: OBSTETRICS AND GYNECOLOGY | Facility: CLINIC | Age: 49
End: 2024-11-05
Payer: COMMERCIAL

## 2024-11-05 ENCOUNTER — PATIENT MESSAGE (OUTPATIENT)
Dept: OBSTETRICS AND GYNECOLOGY | Facility: CLINIC | Age: 49
End: 2024-11-05

## 2024-11-05 DIAGNOSIS — N95.1 PERIMENOPAUSAL SYMPTOMS: ICD-10-CM

## 2024-11-05 DIAGNOSIS — N95.1 MENOPAUSAL SYMPTOMS: ICD-10-CM

## 2024-11-05 PROCEDURE — 1160F RVW MEDS BY RX/DR IN RCRD: CPT | Mod: CPTII,95,, | Performed by: NURSE PRACTITIONER

## 2024-11-05 PROCEDURE — 1159F MED LIST DOCD IN RCRD: CPT | Mod: CPTII,95,, | Performed by: NURSE PRACTITIONER

## 2024-11-05 PROCEDURE — 99214 OFFICE O/P EST MOD 30 MIN: CPT | Mod: 95,,, | Performed by: NURSE PRACTITIONER

## 2024-11-05 RX ORDER — PROGESTERONE 100 MG/1
200 CAPSULE ORAL NIGHTLY
Qty: 60 CAPSULE | Refills: 11 | Status: SHIPPED | OUTPATIENT
Start: 2024-11-05 | End: 2025-11-05

## 2024-11-05 RX ORDER — TESTOSTERONE CYPIONATE 200 MG/ML
50 INJECTION, SOLUTION INTRAMUSCULAR
Status: SHIPPED | OUTPATIENT
Start: 2024-11-20 | End: 2025-03-12

## 2024-11-06 NOTE — PROGRESS NOTES
Subjective:      Mary Kate Lima is a 48 y.o. female who is here for follow-up of hormone replacement therapy via virtual visit. She is SP hysterectomy. Currently taking Prometrium 100 mg PO QHS, Vivelle Dot 0.025 mg/24 hr PTSW twice weekly, and Testosterone Cypionate. Persistent daily headaches with use of current dose Vivelle dose, she attempted use of 1/2 patch- headaches improved but menopausal symptoms returned. Insomnia reported.    No change in libido at current Testosterone injection dose.     Lab Visit on 10/15/2024   Component Date Value Ref Range Status    Testosterone, Free 10/15/2024 0.7  pg/mL Final    Testosterone, Total 10/15/2024 26  5 - 73 ng/dL Final    Estradiol 10/15/2024 43  See Text pg/mL Final    Progesterone 10/15/2024 3.7  See Text ng/mL Final       Past Medical History:   Diagnosis Date    Abnormal Pap smear of cervix      Past Surgical History:   Procedure Laterality Date     SECTION      x3    CYSTOSCOPY  2024    Procedure: CYSTOSCOPY;  Surgeon: Tg Smith MD;  Location: [facility];  Service: OB/GYN;;    ELBOW SURGERY Right     HEMORRHOID SURGERY      HYSTEROSCOPIC RESECTION OF MYOMA N/A 2024    Procedure: MYOMECTOMY, HYSTEROSCOPIC;  Surgeon: Tg Smith MD;  Location: [facility] OR;  Service: OB/GYN;  Laterality: N/A;    INTRAUTERINE DEVICE INSERTION N/A 2024    Procedure: INSERTION, INTRAUTERINE DEVICE;  Surgeon: Tg Smith MD;  Location: [facility] OR;  Service: OB/GYN;  Laterality: N/A;    LAPAROSCOPIC SALPINGECTOMY Bilateral 2024    Procedure: SALPINGECTOMY, LAPAROSCOPIC;  Surgeon: Tg Smith MD;  Location: [facility] OR;  Service: OB/GYN;  Laterality: Bilateral;    LAPAROSCOPIC TOTAL HYSTERECTOMY N/A 2024    Procedure: HYSTERECTOMY, TOTAL, LAPAROSCOPIC;  Surgeon: Tg Smith MD;  Location: [facility] OR;  Service: OB/GYN;  Laterality: N/A;    TONSILLECTOMY  78    TUBAL LIGATION       Social History     Tobacco Use     Smoking status: Never    Smokeless tobacco: Never   Substance Use Topics    Alcohol use: Yes     Alcohol/week: 4.0 standard drinks of alcohol     Types: 1 Glasses of wine, 3 Shots of liquor per week     Comment: socially    Drug use: Never     Family History   Problem Relation Name Age of Onset    Breast cancer Paternal Grandmother      Breast cancer Paternal Aunt      Diabetes Maternal Grandmother Yanira     Colon cancer Neg Hx      Ovarian cancer Neg Hx       OB History    Para Term  AB Living   3 3 3     3   SAB IAB Ectopic Multiple Live Births           3      # Outcome Date GA Lbr Gerber/2nd Weight Sex Type Anes PTL Lv   3 Term 06 38w0d  3.799 kg (8 lb 6 oz) F CS-Unspec EPI  ROSIE   2 Term 00 38w0d  3.345 kg (7 lb 6 oz) F CS-Unspec EPI  ROSIE   1 Term 98 39w0d  2.92 kg (6 lb 7 oz) M CS-Unspec EPI  ROSIE      Obstetric Comments   Menarche age 14       Current Outpatient Medications:     ALPRAZolam (XANAX) 0.25 MG tablet, Take 1 tablet (0.25 mg total) by mouth daily as needed for Anxiety (anxiety, panic attack)., Disp: 30 tablet, Rfl: 0    buPROPion (WELLBUTRIN XL) 300 MG 24 hr tablet, Take 1 tablet (300 mg total) by mouth once daily., Disp: 30 tablet, Rfl: 11    busPIRone (BUSPAR) 10 MG tablet, Take 2 tablets (20 mg total) by mouth 2 (two) times daily., Disp: 120 tablet, Rfl: 11    estradioL (VIVELLE-DOT) 0.025 mg/24 hr, Place 1 patch onto the skin twice a week., Disp: 8 patch, Rfl: 11    ondansetron (ZOFRAN) 4 MG tablet, Take 1 tablet (4 mg total) by mouth every 6 (six) hours as needed for Nausea., Disp: 20 tablet, Rfl: 0    progesterone (PROMETRIUM) 100 MG capsule, Take 2 capsules (200 mg total) by mouth nightly., Disp: 60 capsule, Rfl: 11    sulfamethoxazole-trimethoprim 800-160mg (BACTRIM DS) 800-160 mg Tab, Take 1 tablet by mouth 2 (two) times daily., Disp: 6 tablet, Rfl: 0    traZODone (DESYREL) 50 MG tablet, Take 1 tablet (50 mg total) by mouth every evening., Disp: 30 tablet, Rfl:  11    Current Facility-Administered Medications:     [START ON 11/20/2024] testosterone cypionate injection 50 mg, 50 mg, Intramuscular, Q28 Days,     Facility-Administered Medications Ordered in Other Visits:     dextrose 5 % in lactated ringers infusion, , Intravenous, Continuous, Tg Smith MD    There were no vitals filed for this visit.  There is no height or weight on file to calculate BMI.       Assessment:    Menopausal symptoms  -     testosterone cypionate injection 50 mg    Perimenopausal symptoms  -     progesterone (PROMETRIUM) 100 MG capsule; Take 2 capsules (200 mg total) by mouth nightly.  Dispense: 60 capsule; Refill: 11        Plan:   Risks and benefits of hormone replacement therapy were discussed.  Hormone replacement therapy options, including bioidentical versus non-bioidentical hormones, as well as alternatives discussed.    Increase:  Progesterone to 200 mg orally QPM  Testosterone cypionate to 50 mg Im monthly.  Patient is aware this is off-label use in women, and FDA black box warnings were reviewed.    Can attempt solely changing HRT patch once weekly, recommended taking Mag Glycinate at bedtime additionally.    Follow up in 4 weeks  Instructed patient to call if she experiences any side effects or has any questions.       SANTOS Paul    The patient location is: 20 minutes  The chief complaint leading to consultation is: HRT FU    Visit type: audiovisual    Face to Face time with patient: 20 minutes  20 minutes of total time spent on the encounter, which includes face to face time and non-face to face time preparing to see the patient (eg, review of tests), Obtaining and/or reviewing separately obtained history, Documenting clinical information in the electronic or other health record, Independently interpreting results (not separately reported) and communicating results to the patient/family/caregiver, or Care coordination (not separately reported).       Each patient  to whom he or she provides medical services by telemedicine is:  (1) informed of the relationship between the physician and patient and the respective role of any other health care provider with respect to management of the patient; and (2) notified that he or she may decline to receive medical services by telemedicine and may withdraw from such care at any time.    Notes:

## 2024-11-12 RX ORDER — ONDANSETRON 4 MG/1
4 TABLET, FILM COATED ORAL DAILY PRN
Qty: 30 TABLET | Refills: 1 | Status: SHIPPED | OUTPATIENT
Start: 2024-11-12 | End: 2025-11-12

## 2024-11-15 RX ORDER — TRAZODONE HYDROCHLORIDE 50 MG/1
50 TABLET ORAL NIGHTLY
Qty: 30 TABLET | Refills: 11 | Status: SHIPPED | OUTPATIENT
Start: 2024-11-15

## 2024-11-19 ENCOUNTER — PATIENT MESSAGE (OUTPATIENT)
Dept: ADMINISTRATIVE | Facility: HOSPITAL | Age: 49
End: 2024-11-19
Payer: COMMERCIAL

## 2024-11-19 ENCOUNTER — CLINICAL SUPPORT (OUTPATIENT)
Dept: OBSTETRICS AND GYNECOLOGY | Facility: CLINIC | Age: 49
End: 2024-11-19
Payer: COMMERCIAL

## 2024-11-19 DIAGNOSIS — N95.1 MENOPAUSAL SYMPTOMS: Primary | ICD-10-CM

## 2024-11-19 PROCEDURE — 99999 PR PBB SHADOW E&M-EST. PATIENT-LVL I: CPT | Mod: PBBFAC,,,

## 2024-11-19 PROCEDURE — 96372 THER/PROPH/DIAG INJ SC/IM: CPT | Mod: S$GLB,,, | Performed by: NURSE PRACTITIONER

## 2024-11-19 RX ADMIN — TESTOSTERONE CYPIONATE 50 MG: 200 INJECTION, SOLUTION INTRAMUSCULAR at 03:11

## 2024-11-19 NOTE — PROGRESS NOTES
Here for hormone therapy injection, no complaints at this time, Injection given as ordered, tolerated well, no report of pain prior to or after injection. Return to clinic as scheduled.     Site - RB    Testosterone   50 mg # 1    Clinic Supplied Medication

## 2024-12-06 RX ORDER — BUPROPION HYDROCHLORIDE 300 MG/1
300 TABLET ORAL DAILY
Qty: 30 TABLET | Refills: 11 | Status: SHIPPED | OUTPATIENT
Start: 2024-12-06 | End: 2025-12-06

## 2024-12-17 ENCOUNTER — PATIENT MESSAGE (OUTPATIENT)
Dept: OBSTETRICS AND GYNECOLOGY | Facility: CLINIC | Age: 49
End: 2024-12-17

## 2024-12-17 ENCOUNTER — CLINICAL SUPPORT (OUTPATIENT)
Dept: OBSTETRICS AND GYNECOLOGY | Facility: CLINIC | Age: 49
End: 2024-12-17
Payer: COMMERCIAL

## 2024-12-17 DIAGNOSIS — N95.1 MENOPAUSAL SYMPTOMS: Primary | ICD-10-CM

## 2024-12-17 PROCEDURE — 96372 THER/PROPH/DIAG INJ SC/IM: CPT | Mod: S$GLB,,, | Performed by: NURSE PRACTITIONER

## 2024-12-17 PROCEDURE — 99999 PR PBB SHADOW E&M-EST. PATIENT-LVL I: CPT | Mod: PBBFAC,,,

## 2024-12-17 RX ADMIN — TESTOSTERONE CYPIONATE 50 MG: 200 INJECTION, SOLUTION INTRAMUSCULAR at 03:12

## 2024-12-19 DIAGNOSIS — N95.1 MENOPAUSAL SYMPTOMS: Primary | ICD-10-CM

## 2024-12-19 RX ORDER — ESTRADIOL 0.25 MG/.25G
1 GEL TOPICAL DAILY
Qty: 30 PACKET | Refills: 11 | Status: SHIPPED | OUTPATIENT
Start: 2024-12-19 | End: 2025-12-19

## 2025-01-14 ENCOUNTER — CLINICAL SUPPORT (OUTPATIENT)
Dept: OBSTETRICS AND GYNECOLOGY | Facility: CLINIC | Age: 50
End: 2025-01-14
Payer: COMMERCIAL

## 2025-01-14 DIAGNOSIS — N95.1 MENOPAUSAL SYMPTOMS: Primary | ICD-10-CM

## 2025-01-14 PROCEDURE — 96372 THER/PROPH/DIAG INJ SC/IM: CPT | Mod: S$GLB,,, | Performed by: NURSE PRACTITIONER

## 2025-01-14 PROCEDURE — 99999 PR PBB SHADOW E&M-EST. PATIENT-LVL I: CPT | Mod: PBBFAC,,,

## 2025-01-14 RX ADMIN — TESTOSTERONE CYPIONATE 50 MG: 200 INJECTION, SOLUTION INTRAMUSCULAR at 03:01

## 2025-01-14 NOTE — PROGRESS NOTES
Here for hormone therapy injection, no complaints at this time, Injection given as ordered, tolerated well, no report of pain prior to or after injection. Return to clinic as scheduled.     Site - RB     Testosterone  50 mg # 3      Clinic Supplied Medication

## 2025-01-15 ENCOUNTER — OFFICE VISIT (OUTPATIENT)
Dept: PSYCHIATRY | Facility: CLINIC | Age: 50
End: 2025-01-15
Payer: COMMERCIAL

## 2025-01-15 DIAGNOSIS — F40.10 SOCIAL ANXIETY DISORDER: Primary | ICD-10-CM

## 2025-01-15 DIAGNOSIS — F33.41 RECURRENT MAJOR DEPRESSION IN PARTIAL REMISSION: ICD-10-CM

## 2025-01-15 PROCEDURE — 98006 SYNCH AUDIO-VIDEO EST MOD 30: CPT | Mod: 95,,, | Performed by: STUDENT IN AN ORGANIZED HEALTH CARE EDUCATION/TRAINING PROGRAM

## 2025-01-15 RX ORDER — BUPROPION HYDROCHLORIDE 300 MG/1
300 TABLET ORAL DAILY
Qty: 30 TABLET | Refills: 11 | Status: SHIPPED | OUTPATIENT
Start: 2025-01-15 | End: 2026-01-15

## 2025-01-15 RX ORDER — TRAZODONE HYDROCHLORIDE 50 MG/1
100 TABLET ORAL NIGHTLY
Qty: 60 TABLET | Refills: 11 | Status: SHIPPED | OUTPATIENT
Start: 2025-01-15

## 2025-01-15 RX ORDER — ALPRAZOLAM 0.25 MG/1
0.25 TABLET ORAL DAILY PRN
Qty: 30 TABLET | Refills: 0 | Status: SHIPPED | OUTPATIENT
Start: 2025-01-15 | End: 2025-02-14

## 2025-01-15 NOTE — PROGRESS NOTES
[facility]  DEPARTMENT OF PSYCHIATRY    ESTABLISHED OUTPATIENT VISIT     EXAMINING PRACTITIONER: Deepti Lopez MD      KEY:     I[]I Y = II[x][]II = Yes / Present / Present Though Denies / Endorses  I[]I N = II[][x]II = No / Absent / Absent Though Endorses / Denies  I[]I U = II[][]II = Unknown / Unable to Assess/Enact / Unwilling to Participate/Provide  I[]I A = II[x][x]II = Ambiguity / Uncertainty of Accuracy Exists  I[]I D = Denial or Minimization is Suspected/Evident  I[]I N/A = Non-Applicable    CHIEF COMPLAINT:     Patient Name: Mary Kate Lima  YOB: 1975  MRN: [id number]    Mary Kate Lima is a 49 y.o. female who is being seen by me for a follow up visit.  Mary Kate Lima presents with the chief complaint f/u for depression     CHART REVIEW:     Available documentation has been reviewed, and pertinent elements of the chart have been incorporated into this evaluation where appropriate.    The patient's last encounter with me was on: 5/07/2024    PRESENTATION:     HPI, PSYCHIATRIC ROS & APPLICABLE MEDICAL ROS:     The patient location is: [place]  The chief complaint leading to consultation is: f/u    Visit type: audiovisual    Each patient to whom he or she provides medical services by telemedicine is:  (1) informed of the relationship between the physician and patient and the respective role of any other health care provider with respect to management of the patient; and (2) notified that he or she may decline to receive medical services by telemedicine and may withdraw from such care at any time.    Notes:      Pt presents for follow up- last seen 5/2024. She was having a worsening of insomnia - waking up at 1:30-2oclck wide awake. Hot flashes have improved. She has a new position and is much happier in this new position. Going through menopause. She feels happier and much better.  Kids are good,  is good.   Still taking the wellbutrin, no issues.  Sometimes takes  the xanax but infrequently, maybe once a week- no refills since May.  Her depression and anxiety are much better than they were.   Had hysterectomy in May.    NO new health problems or health diagnoses.   No thouhgts of suicide or self harm.                .  CURRENT PSYCHOTROPIC REGIMEN:     Wellbutrin 300 mg XL  Buspar 10 mg BID  Trazodone 50      HISTORY:       PAST PSYCHOTROPIC TRIALS:     cymbalta, trintellix, wellbutrin, buspar      ASSESSMENT:     III[x]III  DIAGNOSES AND PROBLEMS:     Major depressive disorder, moderate, recurrent  Social anxiety disorder  Insomnia                       .  PLAN:     IMPRESSION AND RECOMMENDATIONS:     MANAGEMENT PLAN, TREATMENT GOALS, THERAPEUTIC TECHNIQUES/APPROACHES & CLINICAL REASONING:  - continue wellbutrin 300 mg XL as  pt feels it has worsened her anxiety and possible caused sexual issues. Reviewed risks, including the risk for seizure, with the patient   Pt has stopped buspirone and doing well.   - with some increased insomnia possibly 2/2 menopausal sx, which she is also treating- ok to increase trazodone to 100 mg qhs. Reviewed risks, benefits, SE including serotonin syndrome  -ok to take hydroxyzine PRN for anxiety  - wrote short term RX for alprazolam 0.25 mg for episodes of severe anxiety. Informed pt of the risks of continuous Benzodiazepine use including tolerance, dependence and withdrawals that may be life threatening upon abrupt cessation. Also advised not to take Benzodiazepines with Opiates, alcohol, or other sedatives and also not to drive or operate heavy machinery while using Benzodiazepines.   - RTC 3 months or sooner PRN.  - f/u with therapy  - reviewed ER precautions and emergency numbers     No SI, HI, AVH. RTC 2 month. Reviewed ED precautions. Reviewed risks, benefits, SE of medication and all pt concerns and questions addressed.             .  III[x]III  PRESCRIPTION DRUG MANAGEMENT:     Prescription Drug Management entails the review,  "recommendation, or consideration without recommendation of medications, and as such was employed during the encounter.    Discussed, to the extent possible, diagnosis, risks and benefits of proposed treatment vs alternative treatments vs no treatment, potential side effects of these treatments and the inherent unpredictability of treatment. The patient expresses understanding of the above and displays the capacity to agree with this treatment given said understanding. Patient also agrees that, currently, the benefits outweigh the risks and would like to pursue treatment at this time.     Written material has additionally been provided, via the AVS or other pre-printed handouts.      EXAMINATION:     VITALS:     LMP 03/01/2024 (Exact Date)     MENTAL STATUS EXAMINATION:     Mental Status Exam:  Appearance: unremarkable, age appropriate  Behavior/Cooperation: lppropriate normal, cooperative  Speech: appropriate rate, volume and tone normal tone, normal rate, normal pitch, normal volume  Language: uses words appropriately; NO aphasia or dysarthria  Mood: "a lot better"  Affect:  congruent with mood and appropriate to situation/content   Thought Process: normal and logical  Thought Content: normal, no suicidality, no homicidality, delusions, or paranoia  Level of Consciousness: Alert and Oriented x3  Memory:  Intact  Attention/concentration: appropriate for age/education.   Fund of Knowledge: appears adequate  Insight:  Intact  Judgment: Intact      RISK:     MITIGATION:     Risk Mitigation Strategies, Harm Reduction Techniques, and Safety Netting are important interventions that can reduce acute and chronic risk.  Written material has been provided to supplement, augment, and reinforce any discussions and interventions, via the AVS or other pre-printed handouts.    PRESCRIPTION DRUG MONITORING:     LA/MS [facility] AWARE  Site reviewed - No recent discrepancies or irregularities are noted.    MEDICAL DECISION MAKING: "     Shared medical decision making and informed consent are the hallmark and bedrock of good clinical care, and as such have been employed and obtained, respectively, to the degree possible.  Written material has been provided to supplement, augment, and reinforce any discussions and interventions, via the AVS or other pre-printed handouts.    Additional psychoeducation has been provided, as warranted.      LEVEL OF MEDICAL DECISION MAKING AND TIME:     [facility] 4  Deepti Lopez MD  Department of Psychiatry  [facility]      DATA:     DIAGNOSTIC TESTING:     The chart was reviewed for recent diagnostic procedures and investigations, and pertinent results are noted below.    WEIGHTS & VITALS:     Wt Readings from Last 2 Encounters:   07/01/24 60.5 kg (133 lb 6.1 oz)   06/10/24 59.4 kg (131 lb)     BP Readings from Last 1 Encounters:   07/01/24 118/76     Pulse Readings from Last 1 Encounters:   05/21/24 87        PERTINENT LABORATORY RESULTS:     Blood Counts, Electrolytes & Glucose: (i.e. WBC, ANC, Hemoglobin, Hematocrit, MCV, Platelets)  Lab Results   Component Value Date    WBC 4.61 05/21/2024    GRAN 2.1 05/21/2024    GRAN 45.3 05/21/2024    HGB 12.0 05/21/2024    HCT 36.5 (L) 05/21/2024    MCV 89 05/21/2024     05/21/2024     03/18/2024    K 5.1 03/18/2024    CALCIUM 9.9 03/18/2024    CO2 23 03/18/2024    ANIONGAP 8 03/18/2024    GLU 90 03/18/2024       Renal, Liver, Pancreas, Thyroid, Parathyroid, Prolactin, CPK, Lipids & Vitamin Levels: (i.e. Cr, BUN, Anion Gap, GFR, Urine Specific Gravity, Urine Protein, Microalburnin, AST, ALT, GGT, Alk Phos,Total Bili, Total Protein, Albumin, Ammonia, INR, Amylase, Lipase, TSH, Total T3, Total T4, Free T4 PTH, Prolactin, CPK, Cholesterol, Triglycerides, LDH, HDL, Vitamin B12, Folate, Vitamin D)  Lab Results   Component Value Date    CREATININE 0.9 03/18/2024    BUN 11 03/18/2024    EGFRNORACEVR >60.0 03/18/2024    AST 18 03/18/2024    ALT 16 03/18/2024     "ALKPHOS 38 (L) 03/18/2024    BILITOT 0.4 03/18/2024    ALBUMIN 4.1 03/18/2024    TSH 0.794 06/03/2024    CHOL 190 12/01/2023    TRIG 68 12/01/2023    LDLCALC 98.4 12/01/2023    HDL 78 (H) 12/01/2023       Infection Diseases, Pregnancy Screenings & Drug Levels: (i.e. Hepatitis Panel, HIV, Syphilis, Urine & Blood Pregnancy Screens, beta hCG, Lithium, Valproic Acid, Carbamazepine, Lamotrigine, Phenytoin, Phenobarbital, Clozapine, Norclozapine, Clozapine + Norclozapine)   Lab Results   Component Value Date    HEPCAB Non-reactive 12/01/2023    [id number] Non-reactive 12/01/2023       Addiction: (i.e. Urine Toxicology, Blood Alcohol, PETH, EtG, EtS, CDT, Buprenorphine, Norbuprenorphine)  No results found for: "PCDSOALCOHOL", "PCDSOBENZOD", "BARBITURATES", "PCDSCOMETHA", "OPIATESCREEN", "COCAINEMETAB", "AMPHETAMINES", "MARIJUANATHC", "PCDSOPHENCYN", "PCDSUBUPRE", "PCDSUFENTANY", "PCDSUOXYCOD", "PCDSUTRAMA", "[id number]", "PETH", "ALCOHOLMEDIC", "THEYLGLUCU", "ETHYLSULF", "CDT", "BUPRENORPH", "NORBUPRENOR"    CARDIAC:     Results for orders placed or performed in visit on 11/30/23   IN OFFICE EKG 12-LEAD (to [place])    Collection Time: 11/30/23  2:11 PM    Narrative    Test Reason : R00.2,    Vent. Rate : 071 BPM     Atrial Rate : 071 BPM     P-R Int : 118 ms          QRS Dur : 076 ms      QT Int : 406 ms       P-R-T Axes : 054 055 023 degrees     QTc Int : 441 ms    Sinus rhythm with Premature supraventricular complexes  Otherwise normal ECG  No previous ECGs available  Confirmed by Selina Levine MD ([id number]) on 12/5/2023 9:30:16 AM    Referred By:             Confirmed By:Selina Levine MD                 "

## 2025-02-04 ENCOUNTER — LAB VISIT (OUTPATIENT)
Dept: LAB | Facility: HOSPITAL | Age: 50
End: 2025-02-04
Attending: NURSE PRACTITIONER
Payer: COMMERCIAL

## 2025-02-04 DIAGNOSIS — N95.1 MENOPAUSAL SYMPTOMS: ICD-10-CM

## 2025-02-04 LAB
ESTRADIOL SERPL-MCNC: 139 PG/ML
PROGEST SERPL-MCNC: 8.4 NG/ML
TESTOST SERPL-MCNC: 51 NG/DL (ref 5–73)

## 2025-02-04 PROCEDURE — 84144 ASSAY OF PROGESTERONE: CPT | Performed by: NURSE PRACTITIONER

## 2025-02-04 PROCEDURE — 84402 ASSAY OF FREE TESTOSTERONE: CPT | Performed by: NURSE PRACTITIONER

## 2025-02-04 PROCEDURE — 82670 ASSAY OF TOTAL ESTRADIOL: CPT | Performed by: NURSE PRACTITIONER

## 2025-02-04 PROCEDURE — 84403 ASSAY OF TOTAL TESTOSTERONE: CPT | Performed by: NURSE PRACTITIONER

## 2025-02-07 LAB — TESTOST FREE SERPL-MCNC: 0.6 PG/ML

## 2025-02-10 ENCOUNTER — PATIENT MESSAGE (OUTPATIENT)
Dept: OBSTETRICS AND GYNECOLOGY | Facility: CLINIC | Age: 50
End: 2025-02-10
Payer: COMMERCIAL

## 2025-02-11 ENCOUNTER — PATIENT MESSAGE (OUTPATIENT)
Dept: OBSTETRICS AND GYNECOLOGY | Facility: CLINIC | Age: 50
End: 2025-02-11

## 2025-02-11 ENCOUNTER — TELEPHONE (OUTPATIENT)
Dept: OBSTETRICS AND GYNECOLOGY | Facility: CLINIC | Age: 50
End: 2025-02-11

## 2025-02-11 ENCOUNTER — OFFICE VISIT (OUTPATIENT)
Dept: OBSTETRICS AND GYNECOLOGY | Facility: CLINIC | Age: 50
End: 2025-02-11
Payer: COMMERCIAL

## 2025-02-11 ENCOUNTER — CLINICAL SUPPORT (OUTPATIENT)
Dept: OBSTETRICS AND GYNECOLOGY | Facility: CLINIC | Age: 50
End: 2025-02-11
Payer: COMMERCIAL

## 2025-02-11 DIAGNOSIS — L68.0 ANDROGEN-DEPENDENT HIRSUTISM: ICD-10-CM

## 2025-02-11 DIAGNOSIS — N95.1 MENOPAUSAL SYMPTOMS: Primary | ICD-10-CM

## 2025-02-11 DIAGNOSIS — R68.82 LOW LIBIDO: ICD-10-CM

## 2025-02-11 PROCEDURE — 1159F MED LIST DOCD IN RCRD: CPT | Mod: CPTII,95,, | Performed by: NURSE PRACTITIONER

## 2025-02-11 PROCEDURE — 98006 SYNCH AUDIO-VIDEO EST MOD 30: CPT | Mod: 95,,, | Performed by: NURSE PRACTITIONER

## 2025-02-11 PROCEDURE — 1160F RVW MEDS BY RX/DR IN RCRD: CPT | Mod: CPTII,95,, | Performed by: NURSE PRACTITIONER

## 2025-02-11 PROCEDURE — 99999 PR PBB SHADOW E&M-EST. PATIENT-LVL I: CPT | Mod: PBBFAC,,,

## 2025-02-11 RX ORDER — SPIRONOLACTONE 50 MG/1
50 TABLET, FILM COATED ORAL DAILY
Qty: 30 TABLET | Refills: 11 | Status: SHIPPED | OUTPATIENT
Start: 2025-02-11 | End: 2026-02-11

## 2025-02-11 RX ORDER — TESTOSTERONE CYPIONATE 200 MG/ML
76 INJECTION, SOLUTION INTRAMUSCULAR
Status: SHIPPED | OUTPATIENT
Start: 2025-02-11 | End: 2025-06-03

## 2025-02-11 RX ADMIN — TESTOSTERONE CYPIONATE 76 MG: 200 INJECTION, SOLUTION INTRAMUSCULAR at 03:02

## 2025-02-11 NOTE — PROGRESS NOTES
Here for hormone therapy injection, no complaints at this time, Injection given as ordered, tolerated well, no report of pain prior to or after injection. Return to clinic as scheduled.     Site - [facility]     Testosterone  76 mg # 1    Clinic Supplied Medication

## 2025-02-11 NOTE — PROGRESS NOTES
Subjective:      Mary Kate Lima is a 49 y.o. female who is here for follow-up of hormone replacement therapy via virtual visit. Current use of Divigel 0.25/0.25 gram (0.1%) GelPk daily, Prometrium 100 mg PO QHS, and Testosterone Cypionate 50 mg IM monthly. Excellent control of hot flashes, night sweats with Divigel, resolution of headaches since switch from Vivelle. Continued absence of libido despite monthly Testosterone injections, acne to back additionally.    Lab Visit on 2025   Component Date Value Ref Range Status    Testosterone, Free 2025 0.6  pg/mL Final    Testosterone, Total 2025 51  5 - 73 ng/dL Final    Estradiol 2025 139  See Text pg/mL Final    Progesterone 2025 8.4  See Text ng/mL Final       Past Medical History:   Diagnosis Date    Abnormal Pap smear of cervix      Past Surgical History:   Procedure Laterality Date     SECTION      x3    CYSTOSCOPY  2024    Procedure: CYSTOSCOPY;  Surgeon: Tg Smith MD;  Location: [facility];  Service: OB/GYN;;    ELBOW SURGERY Right     HEMORRHOID SURGERY      HYSTEROSCOPIC RESECTION OF MYOMA N/A 2024    Procedure: MYOMECTOMY, HYSTEROSCOPIC;  Surgeon: Tg Smith MD;  Location: [facility] OR;  Service: OB/GYN;  Laterality: N/A;    INTRAUTERINE DEVICE INSERTION N/A 2024    Procedure: INSERTION, INTRAUTERINE DEVICE;  Surgeon: Tg Smith MD;  Location: [facility] OR;  Service: OB/GYN;  Laterality: N/A;    LAPAROSCOPIC SALPINGECTOMY Bilateral 2024    Procedure: SALPINGECTOMY, LAPAROSCOPIC;  Surgeon: Tg Smith MD;  Location: [facility] OR;  Service: OB/GYN;  Laterality: Bilateral;    LAPAROSCOPIC TOTAL HYSTERECTOMY N/A 2024    Procedure: HYSTERECTOMY, TOTAL, LAPAROSCOPIC;  Surgeon: Tg Smith MD;  Location: [facility] OR;  Service: OB/GYN;  Laterality: N/A;    TONSILLECTOMY  78    TUBAL LIGATION       Social History     Tobacco Use    Smoking status: Never    Smokeless  tobacco: Never   Substance Use Topics    Alcohol use: Yes     Alcohol/week: 4.0 standard drinks of alcohol     Types: 1 Glasses of wine, 3 Shots of liquor per week     Comment: socially    Drug use: Never     Family History   Problem Relation Name Age of Onset    Breast cancer Paternal Grandmother      Breast cancer Paternal Aunt      Diabetes Maternal Grandmother Yanira     Colon cancer Neg Hx      Ovarian cancer Neg Hx       OB History    Para Term  AB Living   3 3 3     3   SAB IAB Ectopic Multiple Live Births           3      # Outcome Date GA Lbr Gerber/2nd Weight Sex Type Anes PTL Lv   3 Term 06 38w0d  3.799 kg (8 lb 6 oz) F CS-Unspec EPI  ROSIE   2 Term 00 38w0d  3.345 kg (7 lb 6 oz) F CS-Unspec EPI  ROSIE   1 Term 98 39w0d  2.92 kg (6 lb 7 oz) M CS-Unspec EPI  ROSIE      Obstetric Comments   Menarche age 14       Current Outpatient Medications:     ALPRAZolam (XANAX) 0.25 MG tablet, Take 1 tablet (0.25 mg total) by mouth daily as needed for Anxiety (anxiety, panic attack)., Disp: 30 tablet, Rfl: 0    buPROPion (WELLBUTRIN XL) 300 MG 24 hr tablet, Take 1 tablet (300 mg total) by mouth once daily., Disp: 30 tablet, Rfl: 11    busPIRone (BUSPAR) 10 MG tablet, Take 2 tablets (20 mg total) by mouth 2 (two) times daily., Disp: 120 tablet, Rfl: 11    estradioL (DIVIGEL) 0.25 mg/0.25 gram (0.1 %) GlPk, Place 1 packet onto the skin once daily., Disp: 30 packet, Rfl: 11    ondansetron (ZOFRAN) 4 MG tablet, Take 1 tablet (4 mg total) by mouth every 6 (six) hours as needed for Nausea., Disp: 20 tablet, Rfl: 0    ondansetron (ZOFRAN) 4 MG tablet, Take 1 tablet (4 mg total) by mouth daily as needed for Nausea., Disp: 30 tablet, Rfl: 1    progesterone (PROMETRIUM) 100 MG capsule, Take 2 capsules (200 mg total) by mouth nightly., Disp: 60 capsule, Rfl: 11    spironolactone (ALDACTONE) 50 MG tablet, Take 1 tablet (50 mg total) by mouth once daily., Disp: 30 tablet, Rfl: 11     sulfamethoxazole-trimethoprim 800-160mg (BACTRIM DS) 800-160 mg Tab, Take 1 tablet by mouth 2 (two) times daily., Disp: 6 tablet, Rfl: 0    traZODone (DESYREL) 50 MG tablet, Take 2 tablets (100 mg total) by mouth every evening., Disp: 60 tablet, Rfl: 11    Current Facility-Administered Medications:     testosterone cypionate injection 76 mg, 76 mg, Intramuscular, Q28 Days,     Facility-Administered Medications Ordered in Other Visits:     dextrose 5 % in lactated ringers infusion, , Intravenous, Continuous, Tg Smith MD    There were no vitals filed for this visit.  There is no height or weight on file to calculate BMI.       Assessment:    Menopausal symptoms    Low libido  -     testosterone cypionate injection 76 mg    Androgen-dependent hirsutism  -     spironolactone (ALDACTONE) 50 MG tablet; Take 1 tablet (50 mg total) by mouth once daily.  Dispense: 30 tablet; Refill: 11        Plan:   Risks and benefits of hormone replacement therapy were discussed.  Hormone replacement therapy options, including bioidentical versus non-bioidentical hormones, as well as alternatives discussed.    Increase:  Testosterone cypionate to 76 mg Im monthly.  Patient is aware this is off-label use in women, and FDA black box warnings were reviewed.    Start:  Aldactone 50 mg PO QD.     Follow up in 3 months.  Instructed patient to call if she experiences any side effects or has any questions.       SANTOS Paul    The patient location is: [place]  The chief complaint leading to consultation is: HRT FU    Visit type: audiovisual    Face to Face time with patient: 20 minutes  20 minutes of total time spent on the encounter, which includes face to face time and non-face to face time preparing to see the patient (eg, review of tests), Obtaining and/or reviewing separately obtained history, Documenting clinical information in the electronic or other health record, Independently interpreting results (not separately  reported) and communicating results to the patient/family/caregiver, or Care coordination (not separately reported).       Each patient to whom he or she provides medical services by telemedicine is:  (1) informed of the relationship between the physician and patient and the respective role of any other health care provider with respect to management of the patient; and (2) notified that he or she may decline to receive medical services by telemedicine and may withdraw from such care at any time.

## 2025-03-11 ENCOUNTER — CLINICAL SUPPORT (OUTPATIENT)
Dept: OBSTETRICS AND GYNECOLOGY | Facility: CLINIC | Age: 50
End: 2025-03-11
Payer: COMMERCIAL

## 2025-03-11 DIAGNOSIS — N95.1 MENOPAUSAL SYMPTOMS: Primary | ICD-10-CM

## 2025-03-11 PROCEDURE — 99999 PR PBB SHADOW E&M-EST. PATIENT-LVL I: CPT | Mod: PBBFAC,,,

## 2025-03-11 PROCEDURE — 96372 THER/PROPH/DIAG INJ SC/IM: CPT | Mod: S$GLB,,, | Performed by: NURSE PRACTITIONER

## 2025-03-11 RX ADMIN — TESTOSTERONE CYPIONATE 76 MG: 200 INJECTION, SOLUTION INTRAMUSCULAR at 03:03

## 2025-03-11 NOTE — PROGRESS NOTES
Here for hormone therapy injection, no complaints at this time, Injection given as ordered, tolerated well, no report of pain prior to or after injection. Return to clinic as scheduled.     Site - RB    Testosterone  76 mg # 2    Clinic Supplied Medication

## 2025-04-03 ENCOUNTER — E-VISIT (OUTPATIENT)
Dept: PRIMARY CARE CLINIC | Facility: CLINIC | Age: 50
End: 2025-04-03
Payer: COMMERCIAL

## 2025-04-03 DIAGNOSIS — K59.00 CONSTIPATION, UNSPECIFIED CONSTIPATION TYPE: Primary | ICD-10-CM

## 2025-04-04 RX ORDER — PLECANATIDE 3 MG/1
3 TABLET ORAL DAILY
Qty: 30 TABLET | Refills: 0 | Status: SHIPPED | OUTPATIENT
Start: 2025-04-04

## 2025-04-04 NOTE — PROGRESS NOTES
Patient ID: Mary Kate Lima is a 49 y.o. female.    Chief Complaint: General Illness (Entered automatically based on patient selection in [employer].) and Constipation    The patient initiated a request through [employer] on 4/3/2025 for evaluation and management with a chief complaint of General Illness (Entered automatically based on patient selection in [employer].) and Constipation     I evaluated the questionnaire submission on 4/3/25.    [facility] Peq Evisit Supergroup-Common Problems    4/3/2025  9:43 AM CDT - Filed by Patient   What do you need help with? Bowel Movement Problems   Do you agree to participate in an E-Visit? Yes   If you have any of the following symptoms, please present to your local emergency room or call 911:  I acknowledge   Do you have any of the following pregnancy-related conditions? None   What is the main issue you would like addressed today? Bowel move t issues   Do you have any of the following symptoms? Belly bloating;  Constipation;  Feeling like you didnt finish;  Stomach cramps;  Stool that is hard;  Straining or squeezing to pass   Do you have any blood in your stool? No   Have you had unintentional weight loss? No   Did your bowel problem begin after you started a new medication? No   When was your last bowel movement? Yestwrday but very little, 6 days before that   How many times per week do you normally have a bowel movement? Less than 3   I would like to address: Medication for Bowel Movement Problems   How long have you had these symptoms? More than a month   Does anything make your symptoms better? No   Does anything make your symptoms worse? No   Do you want to address a new or existing medication? I would like to start a new medication that I do not already take   What is the name of the medication that you would like to start? Trulance   Have you taken a similar medication in the past? No   Why are you requesting this particular medication? go to the bathroom very little  even  after laxatives, stool softeners, magnesium citrate amd suppository    What medical condition is the  medication intended to treat? IBS?   Provide any additional information you feel is important.    Please attach any relevant images or files    Are you able to take your vital signs? No         Encounter Diagnosis   Name Primary?    Constipation, unspecified constipation type Yes        No orders of the defined types were placed in this encounter.           No follow-ups on file.      E-Visit Time Tracking:    Day 1 Time (in minutes): 5    Total Time (in minutes): 5

## 2025-04-08 ENCOUNTER — CLINICAL SUPPORT (OUTPATIENT)
Dept: OBSTETRICS AND GYNECOLOGY | Facility: CLINIC | Age: 50
End: 2025-04-08
Payer: COMMERCIAL

## 2025-04-08 DIAGNOSIS — N95.1 MENOPAUSAL SYMPTOMS: Primary | ICD-10-CM

## 2025-04-08 PROCEDURE — 96372 THER/PROPH/DIAG INJ SC/IM: CPT | Mod: S$GLB,,, | Performed by: NURSE PRACTITIONER

## 2025-04-08 PROCEDURE — 99999 PR PBB SHADOW E&M-EST. PATIENT-LVL I: CPT | Mod: PBBFAC,,,

## 2025-04-08 RX ADMIN — TESTOSTERONE CYPIONATE 76 MG: 200 INJECTION, SOLUTION INTRAMUSCULAR at 03:04

## 2025-04-08 NOTE — PROGRESS NOTES
Here for hormone therapy injection, no complaints at this time, Injection given as ordered, tolerated well, no report of pain prior to or after injection. Return to clinic as scheduled.     Site - LB    Testosterone  76 mg # 3    Clinic Supplied Medication

## 2025-04-29 ENCOUNTER — LAB VISIT (OUTPATIENT)
Dept: LAB | Facility: HOSPITAL | Age: 50
End: 2025-04-29
Attending: NURSE PRACTITIONER
Payer: COMMERCIAL

## 2025-04-29 DIAGNOSIS — N95.1 MENOPAUSAL SYMPTOMS: ICD-10-CM

## 2025-04-29 LAB
ESTRADIOL SERPL HS-MCNC: 131 PG/ML
PROGEST SERPL-MCNC: 12.6 NG/ML
TESTOST SERPL-MCNC: 113 NG/DL (ref 5–73)

## 2025-04-29 PROCEDURE — 84403 ASSAY OF TOTAL TESTOSTERONE: CPT

## 2025-04-29 PROCEDURE — 84402 ASSAY OF FREE TESTOSTERONE: CPT

## 2025-04-29 PROCEDURE — 82670 ASSAY OF TOTAL ESTRADIOL: CPT

## 2025-04-29 PROCEDURE — 84144 ASSAY OF PROGESTERONE: CPT

## 2025-04-29 PROCEDURE — 36415 COLL VENOUS BLD VENIPUNCTURE: CPT

## 2025-05-01 DIAGNOSIS — K59.00 CONSTIPATION, UNSPECIFIED CONSTIPATION TYPE: ICD-10-CM

## 2025-05-01 NOTE — TELEPHONE ENCOUNTER
No care due was identified.  [facility] Embedded Care Due Messages. Reference number: [id number].   5/01/2025 3:00:01 PM CDT

## 2025-05-01 NOTE — TELEPHONE ENCOUNTER
----- Message from Kathy sent at 5/1/2025  2:56 PM CDT -----  Contact: [facility]   Requesting an RX refill or new RX.Is this a refill or new RX: RX name and strength (copy/paste from chart):  plecanatide (TRULANCE) 3 mg TabIs this a 30 day or 90 day RX: Pharmacy name and phone # (copy/paste from chart):  [facility] - [address] [place] [address]Euavp: [phone] Fax: [phone]

## 2025-05-02 LAB — W FREE TESTOSTERONE: 1 PG/ML

## 2025-05-02 RX ORDER — PLECANATIDE 3 MG/1
3 TABLET ORAL DAILY
Qty: 30 TABLET | Refills: 0 | Status: SHIPPED | OUTPATIENT
Start: 2025-05-02

## 2025-05-05 ENCOUNTER — RESULTS FOLLOW-UP (OUTPATIENT)
Dept: OBSTETRICS AND GYNECOLOGY | Facility: CLINIC | Age: 50
End: 2025-05-05

## 2025-05-06 ENCOUNTER — CLINICAL SUPPORT (OUTPATIENT)
Dept: OBSTETRICS AND GYNECOLOGY | Facility: CLINIC | Age: 50
End: 2025-05-06
Payer: COMMERCIAL

## 2025-05-06 DIAGNOSIS — N95.1 MENOPAUSAL SYMPTOMS: Primary | ICD-10-CM

## 2025-05-06 PROCEDURE — 99999 PR PBB SHADOW E&M-EST. PATIENT-LVL I: CPT | Mod: PBBFAC,,,

## 2025-05-06 PROCEDURE — 96372 THER/PROPH/DIAG INJ SC/IM: CPT | Mod: S$GLB,,, | Performed by: NURSE PRACTITIONER

## 2025-05-06 RX ADMIN — TESTOSTERONE CYPIONATE 76 MG: 200 INJECTION, SOLUTION INTRAMUSCULAR at 03:05

## 2025-05-06 NOTE — PROGRESS NOTES
Here for hormone therapy injection, no complaints at this time, Injection given as ordered, tolerated well, no report of pain prior to or after injection. Return to clinic as scheduled.     Site -RB    Testosterone 76 mg      Clinic Supplied Medication

## 2025-05-13 ENCOUNTER — OFFICE VISIT (OUTPATIENT)
Dept: OBSTETRICS AND GYNECOLOGY | Facility: CLINIC | Age: 50
End: 2025-05-13
Payer: COMMERCIAL

## 2025-05-13 ENCOUNTER — PATIENT MESSAGE (OUTPATIENT)
Dept: OBSTETRICS AND GYNECOLOGY | Facility: CLINIC | Age: 50
End: 2025-05-13

## 2025-05-13 DIAGNOSIS — R68.82 LOW LIBIDO: ICD-10-CM

## 2025-05-13 PROCEDURE — 98007 SYNCH AUDIO-VIDEO EST HI 40: CPT | Mod: 95,,, | Performed by: NURSE PRACTITIONER

## 2025-05-13 RX ORDER — TESTOSTERONE CYPIONATE 200 MG/ML
100 INJECTION, SOLUTION INTRAMUSCULAR
Status: SHIPPED | OUTPATIENT
Start: 2025-05-13 | End: 2025-09-02

## 2025-05-13 NOTE — PROGRESS NOTES
Subjective:      Mary Kate Lima is a 49 y.o. female who is here for follow-up of hormone replacement therapy via virtual visit. She is SP hysterectomy with onset of menopausal symptoms in the past 1-2 years. Current use of Divigel 0.25 mg GlPk twice weekly, Prometrium 200 mg PO QHS, and Testosterone Cypionate 76 mg monthly.     Menopausal symptoms are (finally) much better. Resolution of headaches that were occurring. Twice weekly Divigel providing adequate VMS improvement.    Complete absence of libido, no unwanted androgen effects with the injections and desires libido to improve.    Lab Visit on 2025   Component Date Value Ref Range Status    Free Testosterone 2025 1.0  pg/mL Final    Testosterone Total 2025 113 (H)  5 - 73 ng/dL Final    Estradiol Level 2025 131  See comment pg/mL Final    Progesterone Level 2025 12.6  See Text ng/mL Final       Past Medical History:   Diagnosis Date    Abnormal Pap smear of cervix      Past Surgical History:   Procedure Laterality Date     SECTION      x3    CYSTOSCOPY  2024    Procedure: CYSTOSCOPY;  Surgeon: Tg Smith MD;  Location: [facility];  Service: OB/GYN;;    ELBOW SURGERY Right     HEMORRHOID SURGERY      HYSTEROSCOPIC RESECTION OF MYOMA N/A 2024    Procedure: MYOMECTOMY, HYSTEROSCOPIC;  Surgeon: Tg Smith MD;  Location: [facility] OR;  Service: OB/GYN;  Laterality: N/A;    INTRAUTERINE DEVICE INSERTION N/A 2024    Procedure: INSERTION, INTRAUTERINE DEVICE;  Surgeon: Tg Smith MD;  Location: [facility] OR;  Service: OB/GYN;  Laterality: N/A;    LAPAROSCOPIC SALPINGECTOMY Bilateral 2024    Procedure: SALPINGECTOMY, LAPAROSCOPIC;  Surgeon: Tg Smith MD;  Location: [facility] OR;  Service: OB/GYN;  Laterality: Bilateral;    LAPAROSCOPIC TOTAL HYSTERECTOMY N/A 2024    Procedure: HYSTERECTOMY, TOTAL, LAPAROSCOPIC;  Surgeon: Tg Smith MD;  Location: [facility] OR;   Service: OB/GYN;  Laterality: N/A;    TONSILLECTOMY  78    TUBAL LIGATION       Social History[1]  Family History   Problem Relation Name Age of Onset    Breast cancer Paternal Grandmother      Breast cancer Paternal Aunt      Diabetes Maternal Grandmother Yanira     Colon cancer Neg Hx      Ovarian cancer Neg Hx       OB History    Para Term  AB Living   3 3 3   3   SAB IAB Ectopic Multiple Live Births       3      # Outcome Date GA Lbr Gerber/2nd Weight Sex Type Anes PTL Lv   3 Term 06 38w0d  3.799 kg (8 lb 6 oz) F CS-Unspec EPI  ROSIE   2 Term 00 38w0d  3.345 kg (7 lb 6 oz) F CS-Unspec EPI  ROSIE   1 Term 98 39w0d  2.92 kg (6 lb 7 oz) M CS-Unspec EPI  ROSIE      Obstetric Comments   Menarche age 14     Current Medications[2]    There were no vitals filed for this visit.  There is no height or weight on file to calculate BMI.       Assessment:    Low libido  -     testosterone cypionate injection 100 mg  -     Testosterone, free; Future; Expected date: 2025  -     Testosterone; Future; Expected date: 2025        Plan:   Risks and benefits of hormone replacement therapy were discussed.  Hormone replacement therapy options, including bioidentical versus non-bioidentical hormones, as well as alternatives discussed.    Increase:  Testosterone cypionate to 100 mg Im monthly.  Patient is aware this is off-label use in women, and FDA black box warnings were reviewed.    Continue current Prometrium/Divigel regimen.    Follow up in 4 months. Needs WWE for , notified.  Instructed patient to call if she experiences any side effects or has any questions.       SANTOS Paul    The patient location is: [place]  The chief complaint leading to consultation is: HRT FU    Visit type: audiovisual    Face to Face time with patient: 15 minutes  20 minutes of total time spent on the encounter, which includes face to face time and non-face to face time preparing to see the patient (eg,  review of tests), Obtaining and/or reviewing separately obtained history, Documenting clinical information in the electronic or other health record, Independently interpreting results (not separately reported) and communicating results to the patient/family/caregiver, or Care coordination (not separately reported).       Each patient to whom he or she provides medical services by telemedicine is:  (1) informed of the relationship between the physician and patient and the respective role of any other health care provider with respect to management of the patient; and (2) notified that he or she may decline to receive medical services by telemedicine and may withdraw from such care at any time.           [1]   Social History  Tobacco Use    Smoking status: Never    Smokeless tobacco: Never   Substance Use Topics    Alcohol use: Yes     Alcohol/week: 4.0 standard drinks of alcohol     Types: 1 Glasses of wine, 3 Shots of liquor per week     Comment: socially    Drug use: Never   [2]   Current Outpatient Medications:     ALPRAZolam (XANAX) 0.25 MG tablet, Take 1 tablet (0.25 mg total) by mouth daily as needed for Anxiety (anxiety, panic attack)., Disp: 30 tablet, Rfl: 0    buPROPion (WELLBUTRIN XL) 300 MG 24 hr tablet, Take 1 tablet (300 mg total) by mouth once daily., Disp: 30 tablet, Rfl: 11    estradioL (DIVIGEL) 0.25 mg/0.25 gram (0.1 %) GlPk, Place 1 packet onto the skin once daily., Disp: 30 packet, Rfl: 11    ondansetron (ZOFRAN) 4 MG tablet, Take 1 tablet (4 mg total) by mouth every 6 (six) hours as needed for Nausea., Disp: 20 tablet, Rfl: 0    ondansetron (ZOFRAN) 4 MG tablet, Take 1 tablet (4 mg total) by mouth daily as needed for Nausea., Disp: 30 tablet, Rfl: 1    plecanatide (TRULANCE) 3 mg Tab, Take 1 tablet (3 mg total) by mouth once daily., Disp: 30 tablet, Rfl: 0    progesterone (PROMETRIUM) 100 MG capsule, Take 2 capsules (200 mg total) by mouth nightly., Disp: 60 capsule, Rfl: 11    spironolactone  (ALDACTONE) 50 MG tablet, Take 1 tablet (50 mg total) by mouth once daily., Disp: 30 tablet, Rfl: 11    traZODone (DESYREL) 50 MG tablet, Take 2 tablets (100 mg total) by mouth every evening., Disp: 60 tablet, Rfl: 11    Current Facility-Administered Medications:     testosterone cypionate injection 100 mg, 100 mg, Intramuscular, Q28 Days,     Facility-Administered Medications Ordered in Other Visits:     dextrose 5 % in lactated ringers infusion, , Intravenous, Continuous, Tg Smith MD

## 2025-05-16 ENCOUNTER — TELEPHONE (OUTPATIENT)
Dept: OBSTETRICS AND GYNECOLOGY | Facility: CLINIC | Age: 50
End: 2025-05-16
Payer: COMMERCIAL

## 2025-05-16 NOTE — TELEPHONE ENCOUNTER
----- Message from Porsche Thapa NP sent at 5/13/2025  1:16 PM CDT -----  Please schedule for Testosterone injections x 4 and HRT FU with me in 4 months.

## 2025-05-29 DIAGNOSIS — K59.00 CONSTIPATION, UNSPECIFIED CONSTIPATION TYPE: ICD-10-CM

## 2025-05-29 RX ORDER — PLECANATIDE 3 MG/1
1 TABLET ORAL
Qty: 30 TABLET | Refills: 0 | Status: SHIPPED | OUTPATIENT
Start: 2025-05-29

## 2025-05-29 NOTE — TELEPHONE ENCOUNTER
Care Due:                  Date            Visit Type   Department     Provider  --------------------------------------------------------------------------------                                MYCHART                              FOLLOWUP/OF  [facility]  Last Visit: 05-      [facility]ICE VISIT   ISAIAH Almanza  Next Visit: None Scheduled  None         None Found                                                            Last  Test          Frequency    Reason                     Performed    Due Date  --------------------------------------------------------------------------------    Office Visit  12 months..  plecanatide..............  05- 04-    [facility] Embedded Care Due Messages. Reference number: [id number].   5/29/2025 1:37:45 PM CDT

## 2025-06-05 ENCOUNTER — CLINICAL SUPPORT (OUTPATIENT)
Dept: OBSTETRICS AND GYNECOLOGY | Facility: CLINIC | Age: 50
End: 2025-06-05
Payer: COMMERCIAL

## 2025-06-05 ENCOUNTER — PATIENT MESSAGE (OUTPATIENT)
Dept: OBSTETRICS AND GYNECOLOGY | Facility: CLINIC | Age: 50
End: 2025-06-05

## 2025-06-05 DIAGNOSIS — Z12.31 SCREENING MAMMOGRAM FOR BREAST CANCER: Primary | ICD-10-CM

## 2025-06-05 PROCEDURE — 99999 PR PBB SHADOW E&M-EST. PATIENT-LVL I: CPT | Mod: PBBFAC,,,

## 2025-06-05 RX ADMIN — TESTOSTERONE CYPIONATE 100 MG: 200 INJECTION, SOLUTION INTRAMUSCULAR at 10:06

## 2025-06-12 ENCOUNTER — HOSPITAL ENCOUNTER (OUTPATIENT)
Dept: RADIOLOGY | Facility: HOSPITAL | Age: 50
Discharge: HOME | End: 2025-06-12
Attending: NURSE PRACTITIONER
Payer: COMMERCIAL

## 2025-06-12 VITALS — HEIGHT: 66 IN | BODY MASS INDEX: 21.38 KG/M2 | WEIGHT: 133 LBS

## 2025-06-12 DIAGNOSIS — Z12.31 SCREENING MAMMOGRAM FOR BREAST CANCER: ICD-10-CM

## 2025-06-12 PROCEDURE — 77063 BREAST TOMOSYNTHESIS BI: CPT | Mod: TC

## 2025-06-16 ENCOUNTER — RESULTS FOLLOW-UP (OUTPATIENT)
Dept: OBSTETRICS AND GYNECOLOGY | Facility: CLINIC | Age: 50
End: 2025-06-16

## 2025-06-30 ENCOUNTER — OFFICE VISIT (OUTPATIENT)
Dept: PRIMARY CARE CLINIC | Facility: CLINIC | Age: 50
End: 2025-06-30
Payer: COMMERCIAL

## 2025-06-30 VITALS
DIASTOLIC BLOOD PRESSURE: 74 MMHG | HEART RATE: 63 BPM | SYSTOLIC BLOOD PRESSURE: 98 MMHG | HEIGHT: 66 IN | WEIGHT: 137.56 LBS | RESPIRATION RATE: 18 BRPM | OXYGEN SATURATION: 99 % | BODY MASS INDEX: 22.11 KG/M2

## 2025-06-30 DIAGNOSIS — Z00.00 ANNUAL PHYSICAL EXAM: Primary | ICD-10-CM

## 2025-06-30 PROCEDURE — 1159F MED LIST DOCD IN RCRD: CPT | Mod: CPTII,S$GLB,, | Performed by: INTERNAL MEDICINE

## 2025-06-30 PROCEDURE — 3008F BODY MASS INDEX DOCD: CPT | Mod: CPTII,S$GLB,, | Performed by: INTERNAL MEDICINE

## 2025-06-30 PROCEDURE — 1160F RVW MEDS BY RX/DR IN RCRD: CPT | Mod: CPTII,S$GLB,, | Performed by: INTERNAL MEDICINE

## 2025-06-30 PROCEDURE — 99396 PREV VISIT EST AGE 40-64: CPT | Mod: S$GLB,,, | Performed by: INTERNAL MEDICINE

## 2025-06-30 PROCEDURE — 99999 PR PBB SHADOW E&M-EST. PATIENT-LVL IV: CPT | Mod: PBBFAC,,, | Performed by: INTERNAL MEDICINE

## 2025-06-30 PROCEDURE — 3078F DIAST BP <80 MM HG: CPT | Mod: CPTII,S$GLB,, | Performed by: INTERNAL MEDICINE

## 2025-06-30 PROCEDURE — 3074F SYST BP LT 130 MM HG: CPT | Mod: CPTII,S$GLB,, | Performed by: INTERNAL MEDICINE

## 2025-06-30 RX ORDER — LINACLOTIDE 290 UG/1
290 CAPSULE, GELATIN COATED ORAL
COMMUNITY
Start: 2025-06-09

## 2025-06-30 NOTE — PROGRESS NOTES
[facility] Note    Chief Complaint      Chief Complaint   Patient presents with    Annual Exam       History of Present Illness      Mary Kate Lima is a 49 y.o. female who presents today for   Chief Complaint   Patient presents with    Annual Exam   .  Patient comes to appointment here for annual preventative visit with me . Has colonoscopy scheduled soon with dr mukund hunter . She is uptodate  with mammogram . She is complaint with diet and is getting regular exercise . She is uptodate with gyn dr mustafa .    Problem List Items Addressed This Visit       Annual physical exam - Primary    Overview   pe documented needs full screening labs   Cont current regimen   Will make recs when all reviewed               Past Medical History:  Past Medical History:   Diagnosis Date    Abnormal Pap smear of cervix     Anxiety     Depression        Past Surgical History:  Past Surgical History:   Procedure Laterality Date     SECTION      x3    CYSTOSCOPY  2024    Procedure: CYSTOSCOPY;  Surgeon: Tg Mustafa MD;  Location: [facility];  Service: OB/GYN;;    ELBOW SURGERY Right     HEMORRHOID SURGERY      HYSTERECTOMY      HYSTEROSCOPIC RESECTION OF MYOMA N/A 2024    Procedure: MYOMECTOMY, HYSTEROSCOPIC;  Surgeon: Tg Mustafa MD;  Location: [facility];  Service: OB/GYN;  Laterality: N/A;    INTRAUTERINE DEVICE INSERTION N/A 2024    Procedure: INSERTION, INTRAUTERINE DEVICE;  Surgeon: Tg Mustafa MD;  Location: [facility];  Service: OB/GYN;  Laterality: N/A;    LAPAROSCOPIC SALPINGECTOMY Bilateral 2024    Procedure: SALPINGECTOMY, LAPAROSCOPIC;  Surgeon: Tg Mustafa MD;  Location: [facility] OR;  Service: OB/GYN;  Laterality: Bilateral;    LAPAROSCOPIC TOTAL HYSTERECTOMY N/A 2024    Procedure: HYSTERECTOMY, TOTAL, LAPAROSCOPIC;  Surgeon: Tg Mustafa MD;  Location: [facility] OR;  Service: OB/GYN;  Laterality: N/A;     TONSILLECTOMY  78    TUBAL LIGATION         Family History:  family history includes Breast cancer in her paternal aunt and paternal grandmother; Diabetes in her maternal grandmother.    Social History:  Social History[1]    Review of Systems:   Review of Systems   Constitutional:  Negative for fever and weight loss.   HENT:  Negative for congestion, hearing loss and sore throat.    Eyes:  Negative for blurred vision.   Respiratory:  Negative for cough and shortness of breath.    Cardiovascular:  Negative for chest pain, palpitations, claudication and leg swelling.   Gastrointestinal:  Negative for abdominal pain, constipation, diarrhea and heartburn.   Genitourinary:  Negative for dysuria.   Musculoskeletal:  Negative for back pain and myalgias.   Skin:  Negative for rash.   Neurological:  Negative for focal weakness and headaches.   Psychiatric/Behavioral:  Negative for depression and suicidal ideas. The patient is not nervous/anxious.          Medications:  Encounter Medications[2]     Allergies:  Review of patient's allergies indicates:  No Known Allergies      Physical Exam         Vitals:    06/30/25 1521   BP: 98/74   Pulse: 63   Resp: 18         Physical Exam  Constitutional:       Appearance: She is well-developed.   Eyes:      Pupils: Pupils are equal, round, and reactive to light.   Neck:      Thyroid: No thyromegaly.   Cardiovascular:      Rate and Rhythm: Normal rate.      Heart sounds: Normal heart sounds. No murmur heard.     No friction rub. No gallop.   Pulmonary:      Breath sounds: Normal breath sounds.   Abdominal:      General: Bowel sounds are normal.      Palpations: Abdomen is soft.   Musculoskeletal:         General: Normal range of motion.      Cervical back: Normal range of motion.   Lymphadenopathy:      Cervical: No cervical adenopathy.   Skin:     General: Skin is warm.      Findings: No rash.   Neurological:      Mental Status: She is alert and oriented to person, place, and time.       "Cranial Nerves: No cranial nerve deficit.   Psychiatric:         Behavior: Behavior normal.          Laboratory:  CBC:  No results for input(s): "WBC", "RBC", "HGB", "HCT", "PLT", "MCV", "MCH", "MCHC" in the last 2160 hours.  CMP:  No results for input(s): "GLU", "CALCIUM", "ALBUMIN", "PROT", "NA", "K", "CO2", "CL", "BUN", "ALKPHOS", "ALT", "AST", "BILITOT" in the last 2160 hours.    Invalid input(s): "CREATININ"  URINALYSIS:  No results for input(s): "COLORU", "CLARITYU", "SPECGRAV", "PHUR", "PROTEINUA", "GLUCOSEU", "BILIRUBINCON", "BLOODU", "WBCU", "RBCU", "BACTERIA", "MUCUS", "NITRITE", "LEUKOCYTESUR", "UROBILINOGEN", "HYALINECASTS" in the last 2160 hours.   LIPIDS:  No results for input(s): "TSH", "HDL", "CHOL", "TRIG", "LDLCALC", "CHOLHDL", "NONHDLCHOL", "TOTALCHOLEST" in the last 2160 hours.  TSH:  No results for input(s): "TSH" in the last 2160 hours.  A1C:  No results for input(s): "HGBA1C" in the last 2160 hours.    Radiology:        Assessment:     Mary Kate Lima is a 49 y.o.female with:    Annual physical exam  -     CBC Auto Differential; Future; Expected date: 06/30/2025  -     Comprehensive Metabolic Panel; Future; Expected date: 06/30/2025  -     Lipid Panel; Future; Expected date: 06/30/2025  -     TSH; Future; Expected date: 06/30/2025          Plan:     Problem List Items Addressed This Visit       Annual physical exam - Primary    Overview   pe documented needs full screening labs   Cont current regimen   Will make recs when all reviewed             As above, continue current medications and maintain follow up with specialists.  Return to clinic in 12 months.      Frederick W Dantagnan [facility]                       [1]   Social History  Socioeconomic History    Marital status:    Tobacco Use    Smoking status: Never    Smokeless tobacco: Never   Substance and Sexual Activity    Alcohol use: Yes     Alcohol/week: 4.0 standard drinks of alcohol "     Types: 1 Glasses of wine, 3 Shots of liquor per week     Comment: socially    Drug use: Never    Sexual activity: Yes     Partners: Male     Birth control/protection: Post-menopausal, See Surgical Hx, Other-see comments     Comment: Tubal ligation     Social Drivers of Health     Financial Resource Strain: Low Risk  (1/14/2025)    Overall Financial Resource Strain (CARDIA)     Difficulty of Paying Living Expenses: Not hard at all   Food Insecurity: No Food Insecurity (1/14/2025)    Hunger Vital Sign     Worried About Running Out of Food in the Last Year: Never true     Ran Out of Food in the Last Year: Never true   Transportation Needs: No Transportation Needs (12/18/2023)    PRAPARE - Transportation     Lack of Transportation (Medical): No     Lack of Transportation (Non-Medical): No   Physical Activity: Sufficiently Active (1/14/2025)    Exercise Vital Sign     Days of Exercise per Week: 5 days     Minutes of Exercise per Session: 70 min   Stress: Stress Concern Present (1/14/2025)    [employer] - Occupational Stress Questionnaire     Feeling of Stress : Rather much   Housing Stability: Low Risk  (12/18/2023)    Housing Stability Vital Sign     Unable to Pay for Housing in the Last Year: No     Number of Places Lived in the Last Year: 1     Unstable Housing in the Last Year: No   [2]   Outpatient Encounter Medications as of 6/30/2025   Medication Sig Dispense Refill    ALPRAZolam (XANAX) 0.25 MG tablet Take 1 tablet (0.25 mg total) by mouth daily as needed for Anxiety (anxiety, panic attack). 30 tablet 0    buPROPion (WELLBUTRIN XL) 300 MG 24 hr tablet Take 1 tablet (300 mg total) by mouth once daily. 30 tablet 11    estradioL (DIVIGEL) 0.25 mg/0.25 gram (0.1 %) GlPk Place 1 packet onto the skin once daily. 30 packet 11    LINZESS 290 mcg Cap capsule Take 290 mcg by mouth before breakfast.      ondansetron (ZOFRAN) 4 MG tablet Take 1 tablet (4 mg total) by mouth every 6 (six)  hours as needed for Nausea. 20 tablet 0    ondansetron (ZOFRAN) 4 MG tablet Take 1 tablet (4 mg total) by mouth daily as needed for Nausea. 30 tablet 1    progesterone (PROMETRIUM) 100 MG capsule Take 2 capsules (200 mg total) by mouth nightly. 60 capsule 11    spironolactone (ALDACTONE) 50 MG tablet Take 1 tablet (50 mg total) by mouth once daily. 30 tablet 11    traZODone (DESYREL) 50 MG tablet Take 2 tablets (100 mg total) by mouth every evening. 60 tablet 11    [DISCONTINUED] TRULANCE 3 mg Tab TAKE ONE TABLET BY MOUTH ONCE DAILY (Patient not taking: Reported on 6/30/2025) 30 tablet 0     Facility-Administered Encounter Medications as of 6/30/2025   Medication Dose Route Frequency Provider Last Rate Last Admin    dextrose 5 % in lactated ringers infusion   Intravenous Continuous Tg Smith MD        testosterone cypionate injection 100 mg  100 mg Intramuscular Q28 Days    100 mg at 06/05/25 1010

## 2025-07-02 ENCOUNTER — CLINICAL SUPPORT (OUTPATIENT)
Dept: OBSTETRICS AND GYNECOLOGY | Facility: CLINIC | Age: 50
End: 2025-07-02
Payer: COMMERCIAL

## 2025-07-02 DIAGNOSIS — N95.1 MENOPAUSAL SYMPTOMS: Primary | ICD-10-CM

## 2025-07-02 PROCEDURE — 99999 PR PBB SHADOW E&M-EST. PATIENT-LVL I: CPT | Mod: PBBFAC,,,

## 2025-07-02 RX ADMIN — TESTOSTERONE CYPIONATE 100 MG: 200 INJECTION, SOLUTION INTRAMUSCULAR at 08:07

## 2025-07-02 NOTE — PROGRESS NOTES
Here for hormone therapy injection, no complaints at this time, Injection given as ordered, tolerated well, no report of pain prior to or after injection. Return to clinic as scheduled.     Site - RB    Testosterone  100 mg # 2    Clinic Supplied Medication

## 2025-07-14 LAB — CRC RECOMMENDATION EXT: NORMAL

## 2025-07-15 ENCOUNTER — PATIENT OUTREACH (OUTPATIENT)
Dept: ADMINISTRATIVE | Facility: HOSPITAL | Age: 50
End: 2025-07-15
Payer: COMMERCIAL

## 2025-07-15 NOTE — PROGRESS NOTES
Health Maintenance updated with colonoscopy, dated 7/14/25, from [facility].  Chart reviewed, immunizations reconciled, Care Everywhere updated.  Felicita Ayers LPN  Care Coordinator  [facility]

## 2025-07-30 ENCOUNTER — CLINICAL SUPPORT (OUTPATIENT)
Dept: OBSTETRICS AND GYNECOLOGY | Facility: CLINIC | Age: 50
End: 2025-07-30
Payer: COMMERCIAL

## 2025-07-30 DIAGNOSIS — N95.1 MENOPAUSAL SYMPTOMS: Primary | ICD-10-CM

## 2025-07-30 PROCEDURE — 96372 THER/PROPH/DIAG INJ SC/IM: CPT | Mod: S$GLB,,, | Performed by: NURSE PRACTITIONER

## 2025-07-30 PROCEDURE — 99999 PR PBB SHADOW E&M-EST. PATIENT-LVL I: CPT | Mod: PBBFAC,,,

## 2025-07-30 RX ADMIN — TESTOSTERONE CYPIONATE 100 MG: 200 INJECTION, SOLUTION INTRAMUSCULAR at 09:07

## 2025-07-30 NOTE — PROGRESS NOTES
Here for hormone therapy injection, no complaints at this time, Injection given as ordered, tolerated well, no report of pain prior to or after injection. Return to clinic as scheduled.     Site - LB    Testosterone  100 mg # 3    Clinic Supplied Medication

## 2025-08-28 ENCOUNTER — CLINICAL SUPPORT (OUTPATIENT)
Dept: OBSTETRICS AND GYNECOLOGY | Facility: CLINIC | Age: 50
End: 2025-08-28
Payer: COMMERCIAL

## 2025-08-28 DIAGNOSIS — N95.1 MENOPAUSAL SYMPTOMS: Primary | ICD-10-CM

## 2025-08-28 PROCEDURE — 99999 PR PBB SHADOW E&M-EST. PATIENT-LVL I: CPT | Mod: PBBFAC,,,

## 2025-08-28 RX ADMIN — TESTOSTERONE CYPIONATE 100 MG: 200 INJECTION, SOLUTION INTRAMUSCULAR at 03:08

## 2025-09-02 ENCOUNTER — OFFICE VISIT (OUTPATIENT)
Dept: PSYCHIATRY | Facility: CLINIC | Age: 50
End: 2025-09-02
Payer: COMMERCIAL

## 2025-09-02 DIAGNOSIS — F33.41 RECURRENT MAJOR DEPRESSION IN PARTIAL REMISSION: ICD-10-CM

## 2025-09-02 DIAGNOSIS — F33.2 SEVERE EPISODE OF RECURRENT MAJOR DEPRESSIVE DISORDER, WITHOUT PSYCHOTIC FEATURES: Primary | ICD-10-CM

## 2025-09-02 DIAGNOSIS — F40.10 SOCIAL ANXIETY DISORDER: ICD-10-CM

## 2025-09-02 PROCEDURE — G2211 COMPLEX E/M VISIT ADD ON: HCPCS | Mod: 95,,, | Performed by: STUDENT IN AN ORGANIZED HEALTH CARE EDUCATION/TRAINING PROGRAM

## 2025-09-02 PROCEDURE — 98006 SYNCH AUDIO-VIDEO EST MOD 30: CPT | Mod: 95,,, | Performed by: STUDENT IN AN ORGANIZED HEALTH CARE EDUCATION/TRAINING PROGRAM

## 2025-09-02 RX ORDER — TRAZODONE HYDROCHLORIDE 50 MG/1
100 TABLET ORAL NIGHTLY
Qty: 60 TABLET | Refills: 11 | Status: SHIPPED | OUTPATIENT
Start: 2025-09-02

## 2025-09-02 RX ORDER — ALPRAZOLAM 0.25 MG/1
0.25 TABLET ORAL DAILY PRN
Qty: 30 TABLET | Refills: 0 | Status: SHIPPED | OUTPATIENT
Start: 2025-09-02 | End: 2025-10-02

## 2025-09-02 RX ORDER — BUPROPION HYDROCHLORIDE 300 MG/1
300 TABLET ORAL DAILY
Qty: 90 TABLET | Refills: 3 | Status: SHIPPED | OUTPATIENT
Start: 2025-09-02 | End: 2026-09-02

## (undated) DEVICE — GLOVE SENSICARE PI SURG 6

## (undated) DEVICE — ELECTRODE REM PLYHSV RETURN 9

## (undated) DEVICE — SOL IRR SOD CHL .9% POUR

## (undated) DEVICE — SOL STRL WATER INJ 1000ML BG

## (undated) DEVICE — Device

## (undated) DEVICE — SET TRI-LUMEN FILTERED TUBE

## (undated) DEVICE — OCCLUDER COLPO-PNEUMO STERILE

## (undated) DEVICE — SUT 0 V-LOC GR GS-21 TAPER

## (undated) DEVICE — SYS SEE SHARP SCP ANTIFG LNG

## (undated) DEVICE — SYR 10CC LUER LOCK

## (undated) DEVICE — TIP YANKAUERS BULB NO VENT

## (undated) DEVICE — DEVICE MYOSURE XL FMS TISS REM

## (undated) DEVICE — DEVICE MYOSURE REACH

## (undated) DEVICE — SOL POVIDONE SCRUB IODINE 4 OZ

## (undated) DEVICE — JELLY SURGILUBE 5GR

## (undated) DEVICE — SYR 50CC LL

## (undated) DEVICE — TROCAR ENDOPATH XCEL 5X100MM

## (undated) DEVICE — SOL POVIDONE PREP IODINE 4 OZ

## (undated) DEVICE — SET BASIN 48X48IN 6000ML RING

## (undated) DEVICE — TIP RUMI GREEN DISPOSABLE6.7MM

## (undated) DEVICE — GOWN NONREINF SET-IN SLV XL

## (undated) DEVICE — GLOVE SENSICARE PI GRN 6.5

## (undated) DEVICE — NDL INSUFFLATION VERRES 120MM

## (undated) DEVICE — SUT MCRYL PLUS 4-0 PS2 27IN

## (undated) DEVICE — SOL NACL IRR 3000ML

## (undated) DEVICE — PORT ACCESS 5MM W/120MM

## (undated) DEVICE — TIP RUMI YELLOW 5.1MM 3.75CM

## (undated) DEVICE — SEALER LIGASURE MARYLAND 37CM

## (undated) DEVICE — KIT WING PAD POSITIONING

## (undated) DEVICE — PACK LAPAROSCOPY BAPTIST

## (undated) DEVICE — SET CYSTO IRR DRP CHMBR 84IN

## (undated) DEVICE — PACK FLUENT DISPOSABLE

## (undated) DEVICE — DRAPE STERI LONG

## (undated) DEVICE — CANNULA ENDOPATH XCEL 5X100MM

## (undated) DEVICE — NDL WILLIAMS CYSTOSCOPIC

## (undated) DEVICE — SEAL LENS SCOPE MYOSURE

## (undated) DEVICE — IRRIGATOR ENDOSCOPY DISP.

## (undated) DEVICE — SCISSOR 5MMX35CM DIRECT DRIVE